# Patient Record
Sex: FEMALE | Race: BLACK OR AFRICAN AMERICAN | NOT HISPANIC OR LATINO | Employment: OTHER | URBAN - METROPOLITAN AREA
[De-identification: names, ages, dates, MRNs, and addresses within clinical notes are randomized per-mention and may not be internally consistent; named-entity substitution may affect disease eponyms.]

---

## 2017-01-03 ENCOUNTER — LAB VISIT (OUTPATIENT)
Dept: LAB | Facility: HOSPITAL | Age: 82
End: 2017-01-03
Attending: INTERNAL MEDICINE
Payer: MEDICARE

## 2017-01-03 ENCOUNTER — OFFICE VISIT (OUTPATIENT)
Dept: HEMATOLOGY/ONCOLOGY | Facility: CLINIC | Age: 82
End: 2017-01-03
Payer: MEDICARE

## 2017-01-03 VITALS
HEART RATE: 59 BPM | TEMPERATURE: 98 F | SYSTOLIC BLOOD PRESSURE: 198 MMHG | HEIGHT: 60 IN | OXYGEN SATURATION: 100 % | DIASTOLIC BLOOD PRESSURE: 60 MMHG | RESPIRATION RATE: 18 BRPM | WEIGHT: 104.06 LBS | BODY MASS INDEX: 20.43 KG/M2

## 2017-01-03 DIAGNOSIS — E80.6 HYPERBILIRUBINEMIA: ICD-10-CM

## 2017-01-03 DIAGNOSIS — R74.01 TRANSAMINITIS: ICD-10-CM

## 2017-01-03 DIAGNOSIS — E27.40 ADRENAL INSUFFICIENCY: Primary | ICD-10-CM

## 2017-01-03 DIAGNOSIS — D64.9 ANEMIA, UNSPECIFIED TYPE: ICD-10-CM

## 2017-01-03 LAB
ALBUMIN SERPL BCP-MCNC: 2.6 G/DL
ALP SERPL-CCNC: 93 U/L
ALT SERPL W/O P-5'-P-CCNC: 17 U/L
ANION GAP SERPL CALC-SCNC: 8 MMOL/L
AST SERPL-CCNC: 33 U/L
BILIRUB SERPL-MCNC: 0.6 MG/DL
BUN SERPL-MCNC: 24 MG/DL
CALCIUM SERPL-MCNC: 9 MG/DL
CHLORIDE SERPL-SCNC: 111 MMOL/L
CO2 SERPL-SCNC: 22 MMOL/L
CREAT SERPL-MCNC: 0.7 MG/DL
EST. GFR  (AFRICAN AMERICAN): >60 ML/MIN/1.73 M^2
EST. GFR  (NON AFRICAN AMERICAN): >60 ML/MIN/1.73 M^2
GLUCOSE SERPL-MCNC: 113 MG/DL
POTASSIUM SERPL-SCNC: 3.9 MMOL/L
PROT SERPL-MCNC: 6.8 G/DL
SODIUM SERPL-SCNC: 141 MMOL/L

## 2017-01-03 PROCEDURE — 1160F RVW MEDS BY RX/DR IN RCRD: CPT | Mod: S$GLB,,, | Performed by: INTERNAL MEDICINE

## 2017-01-03 PROCEDURE — 99214 OFFICE O/P EST MOD 30 MIN: CPT | Mod: S$GLB,,, | Performed by: INTERNAL MEDICINE

## 2017-01-03 PROCEDURE — 1157F ADVNC CARE PLAN IN RCRD: CPT | Mod: S$GLB,,, | Performed by: INTERNAL MEDICINE

## 2017-01-03 PROCEDURE — 99499 UNLISTED E&M SERVICE: CPT | Mod: S$GLB,,, | Performed by: INTERNAL MEDICINE

## 2017-01-03 PROCEDURE — 1126F AMNT PAIN NOTED NONE PRSNT: CPT | Mod: S$GLB,,, | Performed by: INTERNAL MEDICINE

## 2017-01-03 PROCEDURE — 1159F MED LIST DOCD IN RCRD: CPT | Mod: S$GLB,,, | Performed by: INTERNAL MEDICINE

## 2017-01-03 PROCEDURE — 80053 COMPREHEN METABOLIC PANEL: CPT

## 2017-01-03 PROCEDURE — 99999 PR PBB SHADOW E&M-EST. PATIENT-LVL III: CPT | Mod: PBBFAC,,, | Performed by: INTERNAL MEDICINE

## 2017-01-03 PROCEDURE — 36415 COLL VENOUS BLD VENIPUNCTURE: CPT

## 2017-01-03 RX ORDER — FLUDROCORTISONE ACETATE 0.1 MG/1
TABLET ORAL
Qty: 30 TABLET | Refills: 0 | Status: SHIPPED | OUTPATIENT
Start: 2017-01-03 | End: 2017-03-17

## 2017-01-03 RX ORDER — FLUDROCORTISONE ACETATE 0.1 MG/1
TABLET ORAL
Qty: 90 TABLET | Refills: 0 | Status: SHIPPED | OUTPATIENT
Start: 2017-01-03 | End: 2017-01-03

## 2017-01-03 NOTE — PROGRESS NOTES
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    She is smiling today.  Patient is accompanied here today by her daughter.  Her daughter reports the patient's appetite remains stable and the patient is continuing to remain active.  SHe does have a history of stroke and carotid artery stenosis.   She has been receiving intermittent procrit with IV iron when needed to help with anemia  She is tolerating meds lisinopril and Procardia for HTN    Diabetes appears well controlled with diet  She remains on Florinef for adrenal insufficiency   Blood pressure elevated again today.  Patient not compliant with her blood pressure medications  Patient reports that she is not having any problems with dizziness, headaches or visual changes.  She also refuses to give up salts in her diet      Current Outpatient Prescriptions:     aspirin (ECOTRIN) 81 MG EC tablet, Take 81 mg by mouth once daily., Disp: , Rfl:     blood sugar diagnostic Strp, True Result glucose strips check once daily, Disp: 100 each, Rfl: 3    blood-glucose meter Misc, True Results glucometer, Disp: 1 each, Rfl: 0    cloNIDine (CATAPRES) 0.1 MG tablet, Take 1 tablet (0.1 mg total) by mouth every morning., Disp: 30 tablet, Rfl: 2    clopidogrel (PLAVIX) 75 mg tablet, Take 1 tablet (75 mg total) by mouth once daily., Disp: 30 tablet, Rfl: 2    cyanocobalamin (VITAMIN B-12) 1000 MCG tablet, Take 100 mcg by mouth once daily., Disp: , Rfl:     ferrous sulfate 325 mg (65 mg iron) Tab tablet, Take 1 tablet (325 mg total) by mouth once daily., Disp: 90 tablet, Rfl: 0    fludrocortisone (FLORINEF) 0.1 mg Tab, TAKE ONE TABLET BY MOUTH ONCE DAILY, Disp: 90 tablet, Rfl: 0    furosemide (LASIX) 40 MG tablet, TAKE ONE TABLET BY MOUTH EVERY MORNING, Disp: 90 tablet, Rfl: 3    hydrOXYzine (ATARAX) 25 MG tablet, Take 25 mg by  mouth 2 (two) times daily as needed for Itching., Disp: , Rfl:     KLOR-CON M20 20 mEq tablet, TAKE ONE TABLET BY MOUTH TWICE DAILY, Disp: 180 tablet, Rfl: 0    lancets Misc, True Result lancets check once daily, Disp: 100 each, Rfl: 3    lisinopril (PRINIVIL,ZESTRIL) 40 MG tablet, Take 1 tablet (40 mg total) by mouth once daily., Disp: 30 tablet, Rfl: 2    nifedipine (PROCARDIA-XL) 60 MG (OSM) 24 hr tablet, Take 1 tablet (60 mg total) by mouth once daily., Disp: 90 tablet, Rfl: 3    pravastatin (PRAVACHOL) 40 MG tablet, Take 1 tablet (40 mg total) by mouth every evening., Disp: 90 tablet, Rfl: 3    vitamin D 1000 units Tab, Take 185 mg by mouth once daily., Disp: , Rfl:     vits A,C,E-lutein-zeax-zn-emanuel 9,650 unit-195 mg-95 unit Cap, Take 1 tablet by mouth 2 (two) times daily with meals., Disp: , Rfl:   No current facility-administered medications for this visit.     REVIEW OF SYSTEMS:  GENERAL:  No fever or chills.  Weight stable  HEENT:  No photophobia, rhinorrhea, sinus congestion, tinnitus, gingival   bleeding, oral ulcers, or sore throat.  RESPIRATORY:  No shortness of breath, cough, or wheezing.  GASTROINTESTINAL:  No abdominal pain, dysphagia, emesis, diarrhea, or   constipation.  No heartburn, abdominal distention, melena, or hematochezia.  GENITOURINARY:  No urinary frequency, hesitancy, dysuria, or hematuria.  MUSCULOSKELETAL:  No neck pain, back pain  NEUROLOGICAL:  No headaches,   Localized paresthesias  PSYCH:  No agitation, change in behavior, anxiety, or depression.      PHYSICAL EXAMINATION:  Visit Vitals    BP (!) 198/60 (BP Location: Right arm, Patient Position: Sitting, BP Method: Automatic)    Pulse (!) 59    Temp 97.9 °F (36.6 °C) (Oral)    Resp 18    Ht 5' (1.524 m)    Wt 47.2 kg (104 lb 0.9 oz)    LMP  (LMP Unknown)    SpO2 100%    BMI 20.32 kg/m2       GENERAL:  Thin body habitus cachectic.  HEENT:  Temporal wasting.  PSYCH:  Pleasant affect today.  LUNGS:  Clear, no use of  accessory muscles during respiration.  CARDIAC:  S1, S2.  ABDOMEN:  Nondistended.  EXTREMITIES:  Thin, no cyanosis,positive  edema.  SKIN:  Warm, dry.    LABS:      Lab Results   Component Value Date    WBC 5.70 12/16/2016    HGB 10.3 (L) 12/16/2016    HCT 32.2 (L) 12/16/2016     (H) 12/16/2016     (L) 12/16/2016       CMP  Sodium   Date Value Ref Range Status   01/03/2017 141 136 - 145 mmol/L Final     Potassium   Date Value Ref Range Status   01/03/2017 3.9 3.5 - 5.1 mmol/L Final     Chloride   Date Value Ref Range Status   01/03/2017 111 (H) 95 - 110 mmol/L Final     CO2   Date Value Ref Range Status   01/03/2017 22 (L) 23 - 29 mmol/L Final     Glucose   Date Value Ref Range Status   01/03/2017 113 (H) 70 - 110 mg/dL Final     BUN, Bld   Date Value Ref Range Status   01/03/2017 24 10 - 30 mg/dL Final     Creatinine   Date Value Ref Range Status   01/03/2017 0.7 0.5 - 1.4 mg/dL Final     Calcium   Date Value Ref Range Status   01/03/2017 9.0 8.7 - 10.5 mg/dL Final     Total Protein   Date Value Ref Range Status   01/03/2017 6.8 6.0 - 8.4 g/dL Final     Albumin   Date Value Ref Range Status   01/03/2017 2.6 (L) 3.5 - 5.2 g/dL Final     Total Bilirubin   Date Value Ref Range Status   01/03/2017 0.6 0.1 - 1.0 mg/dL Final     Comment:     For infants and newborns, interpretation of results should be based  on gestational age, weight and in agreement with clinical  observations.  Premature Infant recommended reference ranges:  Up to 24 hours.............<8.0 mg/dL  Up to 48 hours............<12.0 mg/dL  3-5 days..................<15.0 mg/dL  6-29 days.................<15.0 mg/dL       Alkaline Phosphatase   Date Value Ref Range Status   01/03/2017 93 55 - 135 U/L Final     AST   Date Value Ref Range Status   01/03/2017 33 10 - 40 U/L Final     ALT   Date Value Ref Range Status   01/03/2017 17 10 - 44 U/L Final     Anion Gap   Date Value Ref Range Status   01/03/2017 8 8 - 16 mmol/L Final     eGFR if     Date Value Ref Range Status   01/03/2017 >60 >60 mL/min/1.73 m^2 Final     eGFR if non    Date Value Ref Range Status   01/03/2017 >60 >60 mL/min/1.73 m^2 Final     Comment:     Calculation used to obtain the estimated glomerular filtration  rate (eGFR) is the CKD-EPI equation. Since race is unknown   in our information system, the eGFR values for   -American and Non--American patients are given   for each creatinine result.       Reviewed her labs and cortisol levels    IMPRESSION:  1.  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  Hypoalbuminemia with no improvement  4.  History of stroke equaling a hypercoagulable condition  5.  Elevated kappa light chain   6.  Hypertension not well controlled despite medication: Possibly related to the use of Florinef  7.  Diabetes with neuropathy  8.  Carotid artery stenosis bilaterally : sam plavix  9.  Edema Stable with  Lasix  10.  Adrenal insufficiency currently tolerating Florinef   No procrit today  Decreasing florinef to 1/2 tab every 3rd day  This will help with blood pressure   Explained her daughter had to watch the salt in her diet by using artificial salt products    The patient needs more protein and fluids with electrolytes other than salt  Patient is to continue her usual anticoagulation as above.    RTC 3 weeks for re evaluation  again Discussed diet and she is to ABSTAIN from salt

## 2017-01-03 NOTE — MR AVS SNAPSHOT
[facility]  [address] [place] [address] Drive [address]  [facility] [address]  Phone: [phone]                  Jaqui Odell   1/3/2017 2:20 PM   Office Visit    Description:  Female : 1924   Provider:  Brittnee Shay MD   Department:  [facility]           Reason for Visit     Results           Diagnoses this Visit        Comments    Adrenal insufficiency    -  Primary            To Do List           Future Appointments        Provider Department Dept Phone    2017 8:20 AM LAB[facility] [phone]    2017 1:00 PM MD [facility] - Hematology Oncology [phone]    2017 10:00 AM VASCULAR, CARDIOLOGY [facility]ip[facility] - Vascular Cardiology [phone]    2017 11:20 AM MD Nitesh Barrios [facility]-Inter[facility] [phone]    2017 1:20 PM Ruth Pink MD [facility] [phone]      Goals (5 Years of Data)     None       These Medications        Disp Refills Start End    fludrocortisone (FLORINEF) 0.1 mg Tab 30 tablet 0 1/3/2017     1/2 tab po every third day    Pharmacy: [facility] [address] [place] [address] Ph #: [phone]         [facility]     [facility] -  Assistance  Registered nurses in the [facility] provide clinical advisement, health education, appointment booking, and other advisory services.  Call for this free service at [phone].             Medications           Message regarding Medications     Verify the changes and/or additions to your medication regime listed below are the same as discussed with your clinician today.  If any of these changes or additions are incorrect, please notify your healthcare provider.        START taking these NEW medications        Refills    fludrocortisone (FLORINEF) 0.1 mg Tab 0    Si/2 tab po every third day    Class: Normal           Verify that the  below list of medications is an accurate representation of the medications you are currently taking.  If none reported, the list may be blank. If incorrect, please contact your healthcare provider. Carry this list with you in case of emergency.           Current Medications     aspirin (ECOTRIN) 81 MG EC tablet Take 81 mg by mouth once daily.    blood sugar diagnostic Strp True Result glucose strips check once daily    blood-glucose meter Misc True Results glucometer    cloNIDine (CATAPRES) 0.1 MG tablet Take 1 tablet (0.1 mg total) by mouth every morning.    clopidogrel (PLAVIX) 75 mg tablet Take 1 tablet (75 mg total) by mouth once daily.    cyanocobalamin (VITAMIN B-12) 1000 MCG tablet Take 100 mcg by mouth once daily.    ferrous sulfate 325 mg (65 mg iron) Tab tablet Take 1 tablet (325 mg total) by mouth once daily.    furosemide (LASIX) 40 MG tablet TAKE ONE TABLET BY MOUTH EVERY MORNING    hydrOXYzine (ATARAX) 25 MG tablet Take 25 mg by mouth 2 (two) times daily as needed for Itching.    KLOR-CON M20 20 mEq tablet TAKE ONE TABLET BY MOUTH TWICE DAILY    lancets Misc True Result lancets check once daily    lisinopril (PRINIVIL,ZESTRIL) 40 MG tablet Take 1 tablet (40 mg total) by mouth once daily.    nifedipine (PROCARDIA-XL) 60 MG (OSM) 24 hr tablet Take 1 tablet (60 mg total) by mouth once daily.    pravastatin (PRAVACHOL) 40 MG tablet Take 1 tablet (40 mg total) by mouth every evening.    vitamin D 1000 units Tab Take 185 mg by mouth once daily.    vits A,C,E-lutein-zeax-zn-emanuel 9,650 unit-195 mg-95 unit Cap Take 1 tablet by mouth 2 (two) times daily with meals.    fludrocortisone (FLORINEF) 0.1 mg Tab 1/2 tab po every third day           Clinical Reference Information           Vital Signs - Last Recorded  Most recent update: 1/3/2017  3:04 PM by Luiza Huang LPN    BP Pulse Temp Resp Ht Wt    (!) 198/60 (BP Location: Right arm, Patient Position: Sitting, BP Method: Automatic) (!) 59 97.9 °F (36.6 °C)  (Oral) 18 5' (1.524 m) 47.2 kg (104 lb 0.9 oz)    LMP SpO2 BMI          (LMP Unknown) 100% 20.32 kg/m2        Blood Pressure          Most Recent Value    BP  (!)  198/60      Allergies as of 1/3/2017     No Known Drug Allergies      Immunizations Administered on Date of Encounter - 1/3/2017     None

## 2017-01-16 RX ORDER — NIFEDIPINE 60 MG/1
TABLET, EXTENDED RELEASE ORAL
Qty: 90 TABLET | Refills: 3 | Status: SHIPPED | OUTPATIENT
Start: 2017-01-16 | End: 2018-01-29 | Stop reason: SDUPTHER

## 2017-01-23 ENCOUNTER — LAB VISIT (OUTPATIENT)
Dept: LAB | Facility: HOSPITAL | Age: 82
End: 2017-01-23
Attending: INTERNAL MEDICINE
Payer: MEDICARE

## 2017-01-23 DIAGNOSIS — D64.9 ANEMIA, UNSPECIFIED TYPE: ICD-10-CM

## 2017-01-23 DIAGNOSIS — E80.6 HYPERBILIRUBINEMIA: ICD-10-CM

## 2017-01-23 DIAGNOSIS — R74.01 TRANSAMINITIS: ICD-10-CM

## 2017-01-23 DIAGNOSIS — D50.8 OTHER IRON DEFICIENCY ANEMIA: ICD-10-CM

## 2017-01-23 LAB
ALBUMIN SERPL BCP-MCNC: 2.9 G/DL
ALP SERPL-CCNC: 90 U/L
ALT SERPL W/O P-5'-P-CCNC: 15 U/L
ANION GAP SERPL CALC-SCNC: 11 MMOL/L
AST SERPL-CCNC: 29 U/L
BASOPHILS # BLD AUTO: 0 K/UL
BASOPHILS NFR BLD: 1 %
BILIRUB SERPL-MCNC: 0.5 MG/DL
BUN SERPL-MCNC: 27 MG/DL
CALCIUM SERPL-MCNC: 9.5 MG/DL
CHLORIDE SERPL-SCNC: 112 MMOL/L
CO2 SERPL-SCNC: 21 MMOL/L
CREAT SERPL-MCNC: 0.8 MG/DL
DIFFERENTIAL METHOD: ABNORMAL
EOSINOPHIL # BLD AUTO: 0.2 K/UL
EOSINOPHIL NFR BLD: 3.2 %
ERYTHROCYTE [DISTWIDTH] IN BLOOD BY AUTOMATED COUNT: 13.4 %
EST. GFR  (AFRICAN AMERICAN): >60 ML/MIN/1.73 M^2
EST. GFR  (NON AFRICAN AMERICAN): >60 ML/MIN/1.73 M^2
GLUCOSE SERPL-MCNC: 112 MG/DL
HCT VFR BLD AUTO: 34.4 %
HGB BLD-MCNC: 11.3 G/DL
IRON SERPL-MCNC: 60 UG/DL
LYMPHOCYTES # BLD AUTO: 2 K/UL
LYMPHOCYTES NFR BLD: 41.1 %
MCH RBC QN AUTO: 31.7 PG
MCHC RBC AUTO-ENTMCNC: 32.9 %
MCV RBC AUTO: 97 FL
MONOCYTES # BLD AUTO: 0.3 K/UL
MONOCYTES NFR BLD: 5.8 %
NEUTROPHILS # BLD AUTO: 2.4 K/UL
NEUTROPHILS NFR BLD: 48.9 %
PLATELET # BLD AUTO: 137 K/UL
PMV BLD AUTO: 10.8 FL
POTASSIUM SERPL-SCNC: 3.8 MMOL/L
PROT SERPL-MCNC: 7 G/DL
RBC # BLD AUTO: 3.56 M/UL
SATURATED IRON: 24 %
SODIUM SERPL-SCNC: 144 MMOL/L
TOTAL IRON BINDING CAPACITY: 255 UG/DL
TRANSFERRIN SERPL-MCNC: 172 MG/DL
WBC # BLD AUTO: 4.8 K/UL

## 2017-01-23 PROCEDURE — 36415 COLL VENOUS BLD VENIPUNCTURE: CPT

## 2017-01-23 PROCEDURE — 83540 ASSAY OF IRON: CPT

## 2017-01-23 PROCEDURE — 85025 COMPLETE CBC W/AUTO DIFF WBC: CPT

## 2017-01-23 PROCEDURE — 80053 COMPREHEN METABOLIC PANEL: CPT

## 2017-01-25 ENCOUNTER — OFFICE VISIT (OUTPATIENT)
Dept: HEMATOLOGY/ONCOLOGY | Facility: CLINIC | Age: 82
End: 2017-01-25
Payer: MEDICARE

## 2017-01-25 VITALS
HEART RATE: 50 BPM | HEIGHT: 60 IN | TEMPERATURE: 98 F | DIASTOLIC BLOOD PRESSURE: 63 MMHG | RESPIRATION RATE: 16 BRPM | SYSTOLIC BLOOD PRESSURE: 134 MMHG

## 2017-01-25 DIAGNOSIS — D50.1 IRON DEFICIENCY ANEMIA DUE TO SIDEROPENIC DYSPHAGIA: Primary | ICD-10-CM

## 2017-01-25 DIAGNOSIS — D53.9 ANEMIA, MACROCYTIC: ICD-10-CM

## 2017-01-25 DIAGNOSIS — N18.4 CKD (CHRONIC KIDNEY DISEASE), STAGE 4 (SEVERE): ICD-10-CM

## 2017-01-25 DIAGNOSIS — M81.0 OSTEOPOROSIS: ICD-10-CM

## 2017-01-25 DIAGNOSIS — R29.6 FREQUENT FALLS: ICD-10-CM

## 2017-01-25 PROCEDURE — 1159F MED LIST DOCD IN RCRD: CPT | Mod: S$GLB,,, | Performed by: INTERNAL MEDICINE

## 2017-01-25 PROCEDURE — 1157F ADVNC CARE PLAN IN RCRD: CPT | Mod: S$GLB,,, | Performed by: INTERNAL MEDICINE

## 2017-01-25 PROCEDURE — 99999 PR PBB SHADOW E&M-EST. PATIENT-LVL III: CPT | Mod: PBBFAC,,, | Performed by: INTERNAL MEDICINE

## 2017-01-25 PROCEDURE — 1160F RVW MEDS BY RX/DR IN RCRD: CPT | Mod: S$GLB,,, | Performed by: INTERNAL MEDICINE

## 2017-01-25 PROCEDURE — 99213 OFFICE O/P EST LOW 20 MIN: CPT | Mod: S$GLB,,, | Performed by: INTERNAL MEDICINE

## 2017-01-25 PROCEDURE — 99499 UNLISTED E&M SERVICE: CPT | Mod: S$GLB,,, | Performed by: INTERNAL MEDICINE

## 2017-01-25 PROCEDURE — 1126F AMNT PAIN NOTED NONE PRSNT: CPT | Mod: S$GLB,,, | Performed by: INTERNAL MEDICINE

## 2017-01-25 NOTE — MR AVS SNAPSHOT
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    -  Primary     Anemia, macrocytic         Osteoporosis         CKD (chronic kidney disease), stage 4 (severe)         Frequent falls                To Do List           Future Appointments        Provider Department Dept Phone    2017 10:00 AM VASCULAR, CARDIOLOGY Nitesh Samano - Vascular Cardiology [phone]    2017 11:20 AM MD Nitesh Barrios [facility]-Interventional Card [phone]    2017 11:30 AM LAB[facility] [phone]    2/15/2017 1:00 PM Brittnee Shay MD [facility] [phone]    2017 1:20 PM Ruth Pink MD [facility] [phone]      Goals (5 Years of Data)     None      [facility]     [facility] -  Assistance  Registered nurses in the [facility] provide clinical advisement, health education, appointment booking, and other advisory services.  Call for this free service at [phone].             Medications           Message regarding Medications     Verify the changes and/or additions to your medication regime listed below are the same as discussed with your clinician today.  If any of these changes or additions are incorrect, please notify your healthcare provider.             Verify that the below list of medications is an accurate representation of the medications you are currently taking.  If none reported, the list may be blank. If incorrect, please contact your healthcare provider. Carry this list with you in case of  emergency.           Current Medications     aspirin (ECOTRIN) 81 MG EC tablet Take 81 mg by mouth once daily.    blood sugar diagnostic Strp True Result glucose strips check once daily    blood-glucose meter Misc True Results glucometer    cloNIDine (CATAPRES) 0.1 MG tablet Take 1 tablet (0.1 mg total) by mouth every morning.    clopidogrel (PLAVIX) 75 mg tablet Take 1 tablet (75 mg total) by mouth once daily.    cyanocobalamin (VITAMIN B-12) 1000 MCG tablet Take 100 mcg by mouth once daily.    ferrous sulfate 325 mg (65 mg iron) Tab tablet Take 1 tablet (325 mg total) by mouth once daily.    fludrocortisone (FLORINEF) 0.1 mg Tab 1/2 tab po every third day    furosemide (LASIX) 40 MG tablet TAKE ONE TABLET BY MOUTH EVERY MORNING    hydrOXYzine (ATARAX) 25 MG tablet Take 25 mg by mouth 2 (two) times daily as needed for Itching.    KLOR-CON M20 20 mEq tablet TAKE ONE TABLET BY MOUTH TWICE DAILY    lancets Misc True Result lancets check once daily    lisinopril (PRINIVIL,ZESTRIL) 40 MG tablet Take 1 tablet (40 mg total) by mouth once daily.    nifedipine (PROCARDIA-XL) 60 MG (OSM) 24 hr tablet TAKE ONE TABLET BY MOUTH ONCE DAILY    pravastatin (PRAVACHOL) 40 MG tablet Take 1 tablet (40 mg total) by mouth every evening.    vitamin D 1000 units Tab Take 185 mg by mouth once daily.    vits A,C,E-lutein-zeax-zn-emanuel 9,650 unit-195 mg-95 unit Cap Take 1 tablet by mouth 2 (two) times daily with meals.           Clinical Reference Information           Vital Signs - Last Recorded  Most recent update: 1/25/2017  1:18 PM by Rosa Marte LPN    BP Pulse Temp Resp Ht LMP    134/63 (!) 50 97.6 °F (36.4 °C) 16 5' (1.524 m) (LMP Unknown)      Blood Pressure          Most Recent Value    BP  134/63      Allergies as of 1/25/2017     No Known Drug Allergies      Immunizations Administered on Date of Encounter - 1/25/2017     None      Orders Placed During Today's Visit     Future Labs/Procedures Expected by Expires    CBC auto  differential  1/25/2017 3/26/2018    CMP  1/25/2017 3/26/2018

## 2017-01-25 NOTE — PROGRESS NOTES
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    She is weak today    Patient is accompanied here today by her daughter.    SHe does have a history of stroke and carotid artery stenosis.   Daughter states she is sleeping more than 50 % of the day, not eating  She is tolerating meds lisinopril and Procardia for HTN    Diabetes appears well controlled with diet  She remains on Florinef for adrenal insufficiency   Blood pressure good today.  Patient not compliant with her blood pressure medications  Patient reports that she is not having any problems with dizziness, headaches or visual changes.        Current Outpatient Prescriptions:     aspirin (ECOTRIN) 81 MG EC tablet, Take 81 mg by mouth once daily., Disp: , Rfl:     blood sugar diagnostic Strp, True Result glucose strips check once daily, Disp: 100 each, Rfl: 3    blood-glucose meter Misc, True Results glucometer, Disp: 1 each, Rfl: 0    cloNIDine (CATAPRES) 0.1 MG tablet, Take 1 tablet (0.1 mg total) by mouth every morning., Disp: 30 tablet, Rfl: 2    clopidogrel (PLAVIX) 75 mg tablet, Take 1 tablet (75 mg total) by mouth once daily., Disp: 30 tablet, Rfl: 2    cyanocobalamin (VITAMIN B-12) 1000 MCG tablet, Take 100 mcg by mouth once daily., Disp: , Rfl:     ferrous sulfate 325 mg (65 mg iron) Tab tablet, Take 1 tablet (325 mg total) by mouth once daily., Disp: 90 tablet, Rfl: 0    fludrocortisone (FLORINEF) 0.1 mg Tab, TAKE ONE TABLET BY MOUTH ONCE DAILY, Disp: 90 tablet, Rfl: 0    furosemide (LASIX) 40 MG tablet, TAKE ONE TABLET BY MOUTH EVERY MORNING, Disp: 90 tablet, Rfl: 3    hydrOXYzine (ATARAX) 25 MG tablet, Take 25 mg by mouth 2 (two) times daily as needed for Itching., Disp: , Rfl:     KLOR-CON M20 20 mEq tablet, TAKE ONE TABLET BY MOUTH TWICE DAILY, Disp: 180 tablet, Rfl: 0    lancets Misc, True  Result lancets check once daily, Disp: 100 each, Rfl: 3    lisinopril (PRINIVIL,ZESTRIL) 40 MG tablet, Take 1 tablet (40 mg total) by mouth once daily., Disp: 30 tablet, Rfl: 2    nifedipine (PROCARDIA-XL) 60 MG (OSM) 24 hr tablet, Take 1 tablet (60 mg total) by mouth once daily., Disp: 90 tablet, Rfl: 3    pravastatin (PRAVACHOL) 40 MG tablet, Take 1 tablet (40 mg total) by mouth every evening., Disp: 90 tablet, Rfl: 3    vitamin D 1000 units Tab, Take 185 mg by mouth once daily., Disp: , Rfl:     vits A,C,E-lutein-zeax-zn-emanuel 9,650 unit-195 mg-95 unit Cap, Take 1 tablet by mouth 2 (two) times daily with meals., Disp: , Rfl:   No current facility-administered medications for this visit.     REVIEW OF SYSTEMS:  GENERAL:  No fever or chills.  Weight stable increasing fatigue  HEENT:  No photophobia, rhinorrhea, sinus congestion, tinnitus, gingival   bleeding, oral ulcers, or sore throat.  RESPIRATORY:  No shortness of breath, cough, or wheezing.  GASTROINTESTINAL:  No abdominal pain, dysphagia, emesis, diarrhea, or   constipation.  No heartburn, abdominal distention, melena, or hematochezia.  GENITOURINARY:  No urinary frequency, hesitancy, dysuria, or hematuria.  MUSCULOSKELETAL:  No neck pain, back pain  NEUROLOGICAL:  No headaches,   Localized paresthesias  PSYCH:  No agitation, change in behavior, anxiety, or depression.      PHYSICAL EXAMINATION:  Visit Vitals    /63    Pulse (!) 50    Temp 97.6 °F (36.4 °C)    Resp 16    Ht 5' (1.524 m)    LMP  (LMP Unknown)       GENERAL:  Thin body habitus cachectic.  HEENT:  Temporal wasting.  PSYCH:  Pleasant affect today.  LUNGS:  Clear, no use of accessory muscles during respiration.  CARDIAC:  S1, S2.  ABDOMEN:  Nondistended.  EXTREMITIES:  Thin, no cyanosis,positive  edema.  SKIN:  Warm, dry. POSITIVE tenting    LABS:      Lab Results   Component Value Date    WBC 4.80 01/23/2017    HGB 11.3 (L) 01/23/2017    HCT 34.4 (L) 01/23/2017    MCV 97  01/23/2017     (L) 01/23/2017       CMP  Sodium   Date Value Ref Range Status   01/23/2017 144 136 - 145 mmol/L Final     Potassium   Date Value Ref Range Status   01/23/2017 3.8 3.5 - 5.1 mmol/L Final     Chloride   Date Value Ref Range Status   01/23/2017 112 (H) 95 - 110 mmol/L Final     CO2   Date Value Ref Range Status   01/23/2017 21 (L) 23 - 29 mmol/L Final     Glucose   Date Value Ref Range Status   01/23/2017 112 (H) 70 - 110 mg/dL Final     BUN, Bld   Date Value Ref Range Status   01/23/2017 27 10 - 30 mg/dL Final     Creatinine   Date Value Ref Range Status   01/23/2017 0.8 0.5 - 1.4 mg/dL Final     Calcium   Date Value Ref Range Status   01/23/2017 9.5 8.7 - 10.5 mg/dL Final     Total Protein   Date Value Ref Range Status   01/23/2017 7.0 6.0 - 8.4 g/dL Final     Albumin   Date Value Ref Range Status   01/23/2017 2.9 (L) 3.5 - 5.2 g/dL Final     Total Bilirubin   Date Value Ref Range Status   01/23/2017 0.5 0.1 - 1.0 mg/dL Final     Comment:     For infants and newborns, interpretation of results should be based  on gestational age, weight and in agreement with clinical  observations.  Premature Infant recommended reference ranges:  Up to 24 hours.............<8.0 mg/dL  Up to 48 hours............<12.0 mg/dL  3-5 days..................<15.0 mg/dL  6-29 days.................<15.0 mg/dL       Alkaline Phosphatase   Date Value Ref Range Status   01/23/2017 90 55 - 135 U/L Final     AST   Date Value Ref Range Status   01/23/2017 29 10 - 40 U/L Final     ALT   Date Value Ref Range Status   01/23/2017 15 10 - 44 U/L Final     Anion Gap   Date Value Ref Range Status   01/23/2017 11 8 - 16 mmol/L Final     eGFR if    Date Value Ref Range Status   01/23/2017 >60 >60 mL/min/1.73 m^2 Final     eGFR if non    Date Value Ref Range Status   01/23/2017 >60 >60 mL/min/1.73 m^2 Final     Comment:     Calculation used to obtain the estimated glomerular filtration  rate (eGFR) is the  CKD-EPI equation. Since race is unknown   in our information system, the eGFR values for   -American and Non--American patients are given   for each creatinine result.         IMPRESSION:  1.  Anemia stable MDS with renal disease, thrombocytopenia with no evidence of bleeding  2.  Hypoalbuminemia with no improvement  4.  History of stroke equaling a hypercoagulable condition  5.  Elevated kappa light chain   6.  Hypertension not well controlled despite medication: Possibly related to the use of Florinef  7.  Diabetes with neuropathy  8.  Carotid artery stenosis bilaterally : sam plavix  9.  Edema Stable with  Lasix  10.  Adrenal insufficiency currently tolerating Florinef   No procrit today  Decreasing florinef to 1/2 tab every 3rd day       RTC 3 weeks for re evaluation  Increase hydration with propel etc or elec and fluids  Pt is elderly   Discussed prognosisi

## 2017-02-11 DIAGNOSIS — E87.6 HYPOPOTASSEMIA: ICD-10-CM

## 2017-02-13 ENCOUNTER — LAB VISIT (OUTPATIENT)
Dept: LAB | Facility: HOSPITAL | Age: 82
End: 2017-02-13
Attending: INTERNAL MEDICINE
Payer: MEDICARE

## 2017-02-13 DIAGNOSIS — D50.1 IRON DEFICIENCY ANEMIA DUE TO SIDEROPENIC DYSPHAGIA: ICD-10-CM

## 2017-02-13 LAB
ALBUMIN SERPL BCP-MCNC: 2.7 G/DL
ALP SERPL-CCNC: 76 U/L
ALT SERPL W/O P-5'-P-CCNC: 20 U/L
ANION GAP SERPL CALC-SCNC: 8 MMOL/L
AST SERPL-CCNC: 34 U/L
BASOPHILS # BLD AUTO: 0.1 K/UL
BASOPHILS NFR BLD: 1.3 %
BILIRUB SERPL-MCNC: 0.4 MG/DL
BUN SERPL-MCNC: 33 MG/DL
CALCIUM SERPL-MCNC: 9.1 MG/DL
CHLORIDE SERPL-SCNC: 111 MMOL/L
CO2 SERPL-SCNC: 21 MMOL/L
CREAT SERPL-MCNC: 1 MG/DL
DIFFERENTIAL METHOD: ABNORMAL
EOSINOPHIL # BLD AUTO: 0.2 K/UL
EOSINOPHIL NFR BLD: 4 %
ERYTHROCYTE [DISTWIDTH] IN BLOOD BY AUTOMATED COUNT: 14.2 %
EST. GFR  (AFRICAN AMERICAN): 57 ML/MIN/1.73 M^2
EST. GFR  (NON AFRICAN AMERICAN): 49 ML/MIN/1.73 M^2
GLUCOSE SERPL-MCNC: 185 MG/DL
HCT VFR BLD AUTO: 29.7 %
HGB BLD-MCNC: 9.8 G/DL
LYMPHOCYTES # BLD AUTO: 2.6 K/UL
LYMPHOCYTES NFR BLD: 48.8 %
MCH RBC QN AUTO: 31.9 PG
MCHC RBC AUTO-ENTMCNC: 32.8 %
MCV RBC AUTO: 97 FL
MONOCYTES # BLD AUTO: 0.4 K/UL
MONOCYTES NFR BLD: 8.1 %
NEUTROPHILS # BLD AUTO: 2 K/UL
NEUTROPHILS NFR BLD: 37.8 %
PLATELET # BLD AUTO: 119 K/UL
PMV BLD AUTO: 11.4 FL
POTASSIUM SERPL-SCNC: 4.3 MMOL/L
PROT SERPL-MCNC: 6.3 G/DL
RBC # BLD AUTO: 3.06 M/UL
SODIUM SERPL-SCNC: 140 MMOL/L
WBC # BLD AUTO: 5.3 K/UL

## 2017-02-13 PROCEDURE — 85025 COMPLETE CBC W/AUTO DIFF WBC: CPT

## 2017-02-13 PROCEDURE — 80053 COMPREHEN METABOLIC PANEL: CPT

## 2017-02-13 PROCEDURE — 36415 COLL VENOUS BLD VENIPUNCTURE: CPT

## 2017-02-13 RX ORDER — POTASSIUM CHLORIDE 1500 MG/1
TABLET, EXTENDED RELEASE ORAL
Qty: 180 TABLET | Refills: 0 | Status: SHIPPED | OUTPATIENT
Start: 2017-02-13 | End: 2017-05-20 | Stop reason: SDUPTHER

## 2017-02-15 ENCOUNTER — TELEPHONE (OUTPATIENT)
Dept: HEMATOLOGY/ONCOLOGY | Facility: CLINIC | Age: 82
End: 2017-02-15

## 2017-02-15 NOTE — TELEPHONE ENCOUNTER
Called both pts numbers with no answers and unable to leave a voicemail due to voicemail not being set up.  Called [facility] and spoke her asking her to please tell Zina to return my call at [phone].  Bubba verbalizes understanding and appreciation.

## 2017-02-16 DIAGNOSIS — D63.1 ANEMIA IN CHRONIC KIDNEY DISEASE(285.21): Primary | ICD-10-CM

## 2017-02-16 DIAGNOSIS — N18.9 ANEMIA IN CHRONIC KIDNEY DISEASE(285.21): Primary | ICD-10-CM

## 2017-02-17 DIAGNOSIS — D50.9 IRON DEFICIENCY ANEMIA: ICD-10-CM

## 2017-02-20 DIAGNOSIS — N18.9 ANEMIA OF RENAL DISEASE: Primary | ICD-10-CM

## 2017-02-20 DIAGNOSIS — D63.1 ANEMIA OF RENAL DISEASE: Primary | ICD-10-CM

## 2017-02-21 ENCOUNTER — LAB VISIT (OUTPATIENT)
Dept: LAB | Facility: HOSPITAL | Age: 82
End: 2017-02-21
Attending: INTERNAL MEDICINE
Payer: MEDICARE

## 2017-02-21 DIAGNOSIS — R74.01 TRANSAMINITIS: ICD-10-CM

## 2017-02-21 DIAGNOSIS — D64.9 ANEMIA, UNSPECIFIED TYPE: ICD-10-CM

## 2017-02-21 DIAGNOSIS — E80.6 HYPERBILIRUBINEMIA: ICD-10-CM

## 2017-02-21 LAB
ALBUMIN SERPL BCP-MCNC: 2.9 G/DL
ALP SERPL-CCNC: 80 U/L
ALT SERPL W/O P-5'-P-CCNC: 16 U/L
ANION GAP SERPL CALC-SCNC: 9 MMOL/L
AST SERPL-CCNC: 24 U/L
BASOPHILS # BLD AUTO: 0 K/UL
BASOPHILS NFR BLD: 0.6 %
BILIRUB SERPL-MCNC: 0.5 MG/DL
BUN SERPL-MCNC: 32 MG/DL
CALCIUM SERPL-MCNC: 9.4 MG/DL
CHLORIDE SERPL-SCNC: 111 MMOL/L
CO2 SERPL-SCNC: 22 MMOL/L
CREAT SERPL-MCNC: 0.9 MG/DL
DIFFERENTIAL METHOD: ABNORMAL
EOSINOPHIL # BLD AUTO: 0.2 K/UL
EOSINOPHIL NFR BLD: 4.4 %
ERYTHROCYTE [DISTWIDTH] IN BLOOD BY AUTOMATED COUNT: 14 %
EST. GFR  (AFRICAN AMERICAN): >60 ML/MIN/1.73 M^2
EST. GFR  (NON AFRICAN AMERICAN): 56 ML/MIN/1.73 M^2
GLUCOSE SERPL-MCNC: 118 MG/DL
HCT VFR BLD AUTO: 30.5 %
HGB BLD-MCNC: 10.2 G/DL
LYMPHOCYTES # BLD AUTO: 2.6 K/UL
LYMPHOCYTES NFR BLD: 50.8 %
MCH RBC QN AUTO: 32 PG
MCHC RBC AUTO-ENTMCNC: 33.3 %
MCV RBC AUTO: 96 FL
MONOCYTES # BLD AUTO: 0.3 K/UL
MONOCYTES NFR BLD: 6.7 %
NEUTROPHILS # BLD AUTO: 1.9 K/UL
NEUTROPHILS NFR BLD: 37.5 %
PLATELET # BLD AUTO: 124 K/UL
PMV BLD AUTO: 10.9 FL
POTASSIUM SERPL-SCNC: 3.9 MMOL/L
PROT SERPL-MCNC: 6.7 G/DL
RBC # BLD AUTO: 3.18 M/UL
SODIUM SERPL-SCNC: 142 MMOL/L
WBC # BLD AUTO: 5.2 K/UL

## 2017-02-21 PROCEDURE — 85025 COMPLETE CBC W/AUTO DIFF WBC: CPT

## 2017-02-21 PROCEDURE — 80053 COMPREHEN METABOLIC PANEL: CPT

## 2017-02-21 PROCEDURE — 36415 COLL VENOUS BLD VENIPUNCTURE: CPT

## 2017-02-21 RX ORDER — FERROUS SULFATE 325(65) MG
TABLET ORAL
Qty: 30 TABLET | Refills: 0 | OUTPATIENT
Start: 2017-02-21

## 2017-02-22 ENCOUNTER — OFFICE VISIT (OUTPATIENT)
Dept: HEMATOLOGY/ONCOLOGY | Facility: CLINIC | Age: 82
End: 2017-02-22
Payer: MEDICARE

## 2017-02-22 VITALS
TEMPERATURE: 98 F | HEART RATE: 51 BPM | SYSTOLIC BLOOD PRESSURE: 107 MMHG | RESPIRATION RATE: 14 BRPM | HEIGHT: 60 IN | DIASTOLIC BLOOD PRESSURE: 50 MMHG

## 2017-02-22 DIAGNOSIS — D50.8 IRON DEFICIENCY ANEMIA SECONDARY TO INADEQUATE DIETARY IRON INTAKE: ICD-10-CM

## 2017-02-22 PROCEDURE — 99213 OFFICE O/P EST LOW 20 MIN: CPT | Mod: S$GLB,,, | Performed by: INTERNAL MEDICINE

## 2017-02-22 PROCEDURE — 99999 PR PBB SHADOW E&M-EST. PATIENT-LVL III: CPT | Mod: PBBFAC,,, | Performed by: INTERNAL MEDICINE

## 2017-02-22 PROCEDURE — 1160F RVW MEDS BY RX/DR IN RCRD: CPT | Mod: S$GLB,,, | Performed by: INTERNAL MEDICINE

## 2017-02-22 PROCEDURE — 99499 UNLISTED E&M SERVICE: CPT | Mod: S$GLB,,, | Performed by: INTERNAL MEDICINE

## 2017-02-22 PROCEDURE — 1126F AMNT PAIN NOTED NONE PRSNT: CPT | Mod: S$GLB,,, | Performed by: INTERNAL MEDICINE

## 2017-02-22 PROCEDURE — 1159F MED LIST DOCD IN RCRD: CPT | Mod: S$GLB,,, | Performed by: INTERNAL MEDICINE

## 2017-02-22 PROCEDURE — 1157F ADVNC CARE PLAN IN RCRD: CPT | Mod: S$GLB,,, | Performed by: INTERNAL MEDICINE

## 2017-02-22 RX ORDER — FERROUS SULFATE 325(65) MG
1 TABLET ORAL DAILY
Qty: 90 TABLET | Refills: 0 | Status: SHIPPED | OUTPATIENT
Start: 2017-02-22 | End: 2017-02-22 | Stop reason: SDUPTHER

## 2017-02-22 RX ORDER — FERROUS SULFATE 325(65) MG
1 TABLET ORAL DAILY
Qty: 90 TABLET | Refills: 0 | Status: SHIPPED | OUTPATIENT
Start: 2017-02-22 | End: 2017-03-17 | Stop reason: SDUPTHER

## 2017-02-22 NOTE — PROGRESS NOTES
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    She is weak today    Patient is accompanied here today by her daughter.    SHe does have a history of stroke and carotid artery stenosis.   Daughter states she is sleeping more than 50 % of the day, not eating  She is tolerating meds lisinopril and Procardia for HTN    Diabetes appears well controlled with diet  She remains on Florinef for adrenal insufficiency   Blood pressure good today.  Patient not compliant with her blood pressure medications  Patient reports that she is not having any problems with dizziness, headaches or visual changes.        Current Outpatient Prescriptions:     aspirin (ECOTRIN) 81 MG EC tablet, Take 81 mg by mouth once daily., Disp: , Rfl:     blood sugar diagnostic Strp, True Result glucose strips check once daily, Disp: 100 each, Rfl: 3    blood-glucose meter Misc, True Results glucometer, Disp: 1 each, Rfl: 0    cloNIDine (CATAPRES) 0.1 MG tablet, Take 1 tablet (0.1 mg total) by mouth every morning., Disp: 30 tablet, Rfl: 2    clopidogrel (PLAVIX) 75 mg tablet, Take 1 tablet (75 mg total) by mouth once daily., Disp: 30 tablet, Rfl: 2    cyanocobalamin (VITAMIN B-12) 1000 MCG tablet, Take 100 mcg by mouth once daily., Disp: , Rfl:     ferrous sulfate 325 mg (65 mg iron) Tab tablet, Take 1 tablet (325 mg total) by mouth once daily., Disp: 90 tablet, Rfl: 0    fludrocortisone (FLORINEF) 0.1 mg Tab, TAKE ONE TABLET BY MOUTH ONCE DAILY, Disp: 90 tablet, Rfl: 0    furosemide (LASIX) 40 MG tablet, TAKE ONE TABLET BY MOUTH EVERY MORNING, Disp: 90 tablet, Rfl: 3    hydrOXYzine (ATARAX) 25 MG tablet, Take 25 mg by mouth 2 (two) times daily as needed for Itching., Disp: , Rfl:     KLOR-CON M20 20 mEq tablet, TAKE ONE TABLET BY MOUTH TWICE DAILY, Disp: 180 tablet, Rfl: 0    lancets Misc, True  Result lancets check once daily, Disp: 100 each, Rfl: 3    lisinopril (PRINIVIL,ZESTRIL) 40 MG tablet, Take 1 tablet (40 mg total) by mouth once daily., Disp: 30 tablet, Rfl: 2    nifedipine (PROCARDIA-XL) 60 MG (OSM) 24 hr tablet, Take 1 tablet (60 mg total) by mouth once daily., Disp: 90 tablet, Rfl: 3    pravastatin (PRAVACHOL) 40 MG tablet, Take 1 tablet (40 mg total) by mouth every evening., Disp: 90 tablet, Rfl: 3    vitamin D 1000 units Tab, Take 185 mg by mouth once daily., Disp: , Rfl:     vits A,C,E-lutein-zeax-zn-emanuel 9,650 unit-195 mg-95 unit Cap, Take 1 tablet by mouth 2 (two) times daily with meals., Disp: , Rfl:   No current facility-administered medications for this visit.     REVIEW OF SYSTEMS:  GENERAL:  No fever or chills.  Weight stable increasing fatigue  HEENT:  No photophobia, rhinorrhea, sinus congestion, tinnitus, gingival   bleeding, oral ulcers, or sore throat.  RESPIRATORY:  No shortness of breath, cough, or wheezing.  GASTROINTESTINAL:  No abdominal pain, dysphagia, emesis, diarrhea, or   constipation.  No heartburn, abdominal distention, melena, or hematochezia.  GENITOURINARY:  No urinary frequency, hesitancy, dysuria, or hematuria.  MUSCULOSKELETAL:  No neck pain, back pain  NEUROLOGICAL:  No headaches,   Localized paresthesias  PSYCH:  No agitation, change in behavior, anxiety, or depression.      PHYSICAL EXAMINATION:  Visit Vitals    BP (!) 107/50    Pulse (!) 51    Temp 97.7 °F (36.5 °C)    Resp 14    Ht 5' (1.524 m)    LMP  (LMP Unknown)       GENERAL:  Thin body habitus cachectic.  HEENT:  Temporal wasting.  PSYCH:  Pleasant affect today.  LUNGS:  Clear, no use of accessory muscles during respiration.  CARDIAC:  S1, S2.  ABDOMEN:  Nondistended.  EXTREMITIES:  Thin, no cyanosis,positive  edema.  SKIN:  Warm, dry. POSITIVE tenting    LABS:      Lab Results   Component Value Date    WBC 5.20 02/21/2017    HGB 10.2 (L) 02/21/2017    HCT 30.5 (L) 02/21/2017    MCV 96  02/21/2017     (L) 02/21/2017       CMP  Sodium   Date Value Ref Range Status   02/21/2017 142 136 - 145 mmol/L Final     Potassium   Date Value Ref Range Status   02/21/2017 3.9 3.5 - 5.1 mmol/L Final     Chloride   Date Value Ref Range Status   02/21/2017 111 (H) 95 - 110 mmol/L Final     CO2   Date Value Ref Range Status   02/21/2017 22 (L) 23 - 29 mmol/L Final     Glucose   Date Value Ref Range Status   02/21/2017 118 (H) 70 - 110 mg/dL Final     BUN, Bld   Date Value Ref Range Status   02/21/2017 32 (H) 10 - 30 mg/dL Final     Creatinine   Date Value Ref Range Status   02/21/2017 0.9 0.5 - 1.4 mg/dL Final     Calcium   Date Value Ref Range Status   02/21/2017 9.4 8.7 - 10.5 mg/dL Final     Total Protein   Date Value Ref Range Status   02/21/2017 6.7 6.0 - 8.4 g/dL Final     Albumin   Date Value Ref Range Status   02/21/2017 2.9 (L) 3.5 - 5.2 g/dL Final     Total Bilirubin   Date Value Ref Range Status   02/21/2017 0.5 0.1 - 1.0 mg/dL Final     Comment:     For infants and newborns, interpretation of results should be based  on gestational age, weight and in agreement with clinical  observations.  Premature Infant recommended reference ranges:  Up to 24 hours.............<8.0 mg/dL  Up to 48 hours............<12.0 mg/dL  3-5 days..................<15.0 mg/dL  6-29 days.................<15.0 mg/dL       Alkaline Phosphatase   Date Value Ref Range Status   02/21/2017 80 55 - 135 U/L Final     AST   Date Value Ref Range Status   02/21/2017 24 10 - 40 U/L Final     ALT   Date Value Ref Range Status   02/21/2017 16 10 - 44 U/L Final     Anion Gap   Date Value Ref Range Status   02/21/2017 9 8 - 16 mmol/L Final     eGFR if    Date Value Ref Range Status   02/21/2017 >60 >60 mL/min/1.73 m^2 Final     eGFR if non    Date Value Ref Range Status   02/21/2017 56 (A) >60 mL/min/1.73 m^2 Final     Comment:     Calculation used to obtain the estimated glomerular filtration  rate (eGFR)  is the CKD-EPI equation. Since race is unknown   in our information system, the eGFR values for   -American and Non--American patients are given   for each creatinine result.         IMPRESSION:  1.  Anemia stable MDS with renal disease, thrombocytopenia with no evidence of bleeding  2.  Hypoalbuminemia with no improvement  4.  History of stroke equaling a hypercoagulable condition  5.  Elevated kappa light chain   6.  Hypotension today asymptomatic  7.  Diabetes with neuropathy  8.  Carotid artery stenosis bilaterally : sam plavix  9.  Edema Stable with  Lasix  10.  Adrenal insufficiency currently tolerating Florinef   11. Thrombocytopenia with no evidence of bleeding: asked pt to incorporate folate in her diet and eat to help  No procrit today or Feb 27    Recheck cbc in one week next wed or thursday    RTC 1 week for re evaluation  Increase hydration with propel etc or elec and fluids  Pt is elderly and needs to increase her food intake for nutritional reasons  Discussed prognosis

## 2017-03-02 ENCOUNTER — LAB VISIT (OUTPATIENT)
Dept: LAB | Facility: HOSPITAL | Age: 82
End: 2017-03-02
Attending: INTERNAL MEDICINE
Payer: MEDICARE

## 2017-03-02 DIAGNOSIS — E80.6 HYPERBILIRUBINEMIA: ICD-10-CM

## 2017-03-02 DIAGNOSIS — D64.9 ANEMIA, UNSPECIFIED TYPE: ICD-10-CM

## 2017-03-02 DIAGNOSIS — R74.01 TRANSAMINITIS: ICD-10-CM

## 2017-03-02 LAB
ALBUMIN SERPL BCP-MCNC: 2.7 G/DL
ALP SERPL-CCNC: 85 U/L
ALT SERPL W/O P-5'-P-CCNC: 12 U/L
ANION GAP SERPL CALC-SCNC: 8 MMOL/L
AST SERPL-CCNC: 21 U/L
BASOPHILS # BLD AUTO: 0 K/UL
BASOPHILS NFR BLD: 0.7 %
BILIRUB SERPL-MCNC: 0.6 MG/DL
BUN SERPL-MCNC: 21 MG/DL
CALCIUM SERPL-MCNC: 9.3 MG/DL
CHLORIDE SERPL-SCNC: 109 MMOL/L
CO2 SERPL-SCNC: 23 MMOL/L
CREAT SERPL-MCNC: 0.8 MG/DL
DIFFERENTIAL METHOD: ABNORMAL
EOSINOPHIL # BLD AUTO: 0.2 K/UL
EOSINOPHIL NFR BLD: 3.2 %
ERYTHROCYTE [DISTWIDTH] IN BLOOD BY AUTOMATED COUNT: 15.3 %
EST. GFR  (AFRICAN AMERICAN): >60 ML/MIN/1.73 M^2
EST. GFR  (NON AFRICAN AMERICAN): >60 ML/MIN/1.73 M^2
GLUCOSE SERPL-MCNC: 105 MG/DL
HCT VFR BLD AUTO: 32.5 %
HGB BLD-MCNC: 10.5 G/DL
LYMPHOCYTES # BLD AUTO: 1.9 K/UL
LYMPHOCYTES NFR BLD: 40.9 %
MCH RBC QN AUTO: 31.6 PG
MCHC RBC AUTO-ENTMCNC: 32.2 %
MCV RBC AUTO: 98 FL
MONOCYTES # BLD AUTO: 0.4 K/UL
MONOCYTES NFR BLD: 7.9 %
NEUTROPHILS # BLD AUTO: 2.3 K/UL
NEUTROPHILS NFR BLD: 47.3 %
PLATELET # BLD AUTO: 186 K/UL
PMV BLD AUTO: 10 FL
POTASSIUM SERPL-SCNC: 3.8 MMOL/L
PROT SERPL-MCNC: 6.9 G/DL
RBC # BLD AUTO: 3.31 M/UL
SODIUM SERPL-SCNC: 140 MMOL/L
WBC # BLD AUTO: 4.8 K/UL

## 2017-03-02 PROCEDURE — 80053 COMPREHEN METABOLIC PANEL: CPT

## 2017-03-02 PROCEDURE — 36415 COLL VENOUS BLD VENIPUNCTURE: CPT

## 2017-03-02 PROCEDURE — 85025 COMPLETE CBC W/AUTO DIFF WBC: CPT

## 2017-03-03 ENCOUNTER — OFFICE VISIT (OUTPATIENT)
Dept: HEMATOLOGY/ONCOLOGY | Facility: CLINIC | Age: 82
End: 2017-03-03
Payer: MEDICARE

## 2017-03-03 VITALS
TEMPERATURE: 98 F | BODY MASS INDEX: 19.09 KG/M2 | RESPIRATION RATE: 18 BRPM | HEIGHT: 60 IN | HEART RATE: 59 BPM | SYSTOLIC BLOOD PRESSURE: 146 MMHG | DIASTOLIC BLOOD PRESSURE: 65 MMHG | WEIGHT: 97.25 LBS

## 2017-03-03 DIAGNOSIS — D50.8 IRON DEFICIENCY ANEMIA SECONDARY TO INADEQUATE DIETARY IRON INTAKE: ICD-10-CM

## 2017-03-03 DIAGNOSIS — D63.1 ANEMIA OF RENAL DISEASE: Primary | ICD-10-CM

## 2017-03-03 DIAGNOSIS — N18.9 ANEMIA OF RENAL DISEASE: Primary | ICD-10-CM

## 2017-03-03 PROCEDURE — 1126F AMNT PAIN NOTED NONE PRSNT: CPT | Mod: S$GLB,,, | Performed by: INTERNAL MEDICINE

## 2017-03-03 PROCEDURE — 1160F RVW MEDS BY RX/DR IN RCRD: CPT | Mod: S$GLB,,, | Performed by: INTERNAL MEDICINE

## 2017-03-03 PROCEDURE — 1157F ADVNC CARE PLAN IN RCRD: CPT | Mod: S$GLB,,, | Performed by: INTERNAL MEDICINE

## 2017-03-03 PROCEDURE — 99999 PR PBB SHADOW E&M-EST. PATIENT-LVL IV: CPT | Mod: PBBFAC,,, | Performed by: INTERNAL MEDICINE

## 2017-03-03 PROCEDURE — 99499 UNLISTED E&M SERVICE: CPT | Mod: S$GLB,,, | Performed by: INTERNAL MEDICINE

## 2017-03-03 PROCEDURE — 99213 OFFICE O/P EST LOW 20 MIN: CPT | Mod: S$GLB,,, | Performed by: INTERNAL MEDICINE

## 2017-03-03 PROCEDURE — 1159F MED LIST DOCD IN RCRD: CPT | Mod: S$GLB,,, | Performed by: INTERNAL MEDICINE

## 2017-03-03 RX ORDER — FERROUS SULFATE 325(65) MG
1 TABLET ORAL DAILY
Qty: 90 TABLET | Refills: 0 | OUTPATIENT
Start: 2017-03-03

## 2017-03-03 NOTE — PROGRESS NOTES
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    She is weak today    Patient is accompanied here today by her daughter.    SHe does have a history of stroke and carotid artery stenosis.   Daughter states she is sleeping more than 50 % of the day, not eating  She is tolerating meds lisinopril and Procardia for HTN    Diabetes appears well controlled with diet  She remains on Florinef for adrenal insufficiency   Blood pressure good today.  Patient not compliant with her blood pressure medications  Patient reports that she is not having any problems with dizziness, headaches or visual changes.        Current Outpatient Prescriptions:     aspirin (ECOTRIN) 81 MG EC tablet, Take 81 mg by mouth once daily., Disp: , Rfl:     blood sugar diagnostic Strp, True Result glucose strips check once daily, Disp: 100 each, Rfl: 3    blood-glucose meter Misc, True Results glucometer, Disp: 1 each, Rfl: 0    cloNIDine (CATAPRES) 0.1 MG tablet, Take 1 tablet (0.1 mg total) by mouth every morning., Disp: 30 tablet, Rfl: 2    clopidogrel (PLAVIX) 75 mg tablet, Take 1 tablet (75 mg total) by mouth once daily., Disp: 30 tablet, Rfl: 2    cyanocobalamin (VITAMIN B-12) 1000 MCG tablet, Take 100 mcg by mouth once daily., Disp: , Rfl:     ferrous sulfate 325 mg (65 mg iron) Tab tablet, Take 1 tablet (325 mg total) by mouth once daily., Disp: 90 tablet, Rfl: 0    fludrocortisone (FLORINEF) 0.1 mg Tab, TAKE ONE TABLET BY MOUTH ONCE DAILY, Disp: 90 tablet, Rfl: 0    furosemide (LASIX) 40 MG tablet, TAKE ONE TABLET BY MOUTH EVERY MORNING, Disp: 90 tablet, Rfl: 3    hydrOXYzine (ATARAX) 25 MG tablet, Take 25 mg by mouth 2 (two) times daily as needed for Itching., Disp: , Rfl:     KLOR-CON M20 20 mEq tablet, TAKE ONE TABLET BY MOUTH TWICE DAILY, Disp: 180 tablet, Rfl: 0    lancets Misc, True  Result lancets check once daily, Disp: 100 each, Rfl: 3    lisinopril (PRINIVIL,ZESTRIL) 40 MG tablet, Take 1 tablet (40 mg total) by mouth once daily., Disp: 30 tablet, Rfl: 2    nifedipine (PROCARDIA-XL) 60 MG (OSM) 24 hr tablet, Take 1 tablet (60 mg total) by mouth once daily., Disp: 90 tablet, Rfl: 3    pravastatin (PRAVACHOL) 40 MG tablet, Take 1 tablet (40 mg total) by mouth every evening., Disp: 90 tablet, Rfl: 3    vitamin D 1000 units Tab, Take 185 mg by mouth once daily., Disp: , Rfl:     vits A,C,E-lutein-zeax-zn-emanuel 9,650 unit-195 mg-95 unit Cap, Take 1 tablet by mouth 2 (two) times daily with meals., Disp: , Rfl:   No current facility-administered medications for this visit.     REVIEW OF SYSTEMS:  GENERAL:  No fever or chills.  Weight stable increasing fatigue  HEENT:  No photophobia, rhinorrhea, sinus congestion, tinnitus, gingival   bleeding, oral ulcers, or sore throat.  RESPIRATORY:  No shortness of breath, cough, or wheezing.  GASTROINTESTINAL:  No abdominal pain, dysphagia, emesis, diarrhea, or   constipation.  No heartburn, abdominal distention, melena, or hematochezia.  GENITOURINARY:  No urinary frequency, hesitancy, dysuria, or hematuria.  MUSCULOSKELETAL:  No neck pain, back pain  NEUROLOGICAL:  No headaches,   Localized paresthesias  PSYCH:  No agitation, change in behavior, anxiety, or depression.      PHYSICAL EXAMINATION:  BP (!) 146/65 (BP Location: Left arm, Patient Position: Sitting, BP Method: Automatic)  Pulse (!) 59  Temp 97.6 °F (36.4 °C) (Oral)   Resp 18  Ht 5' (1.524 m)  Wt 44.1 kg (97 lb 3.6 oz)  LMP  (LMP Unknown)  BMI 18.99 kg/m2    GENERAL:  Thin body habitus cachectic.  HEENT:  Temporal wasting.  PSYCH:  Pleasant affect today.  LUNGS:  Clear, no use of accessory muscles during respiration.  CARDIAC:  S1, S2.  ABDOMEN:  Nondistended.  EXTREMITIES:  Thin, no cyanosis,positive  edema.  SKIN:  Warm, dry. POSITIVE tenting    LABS:      Lab Results   Component Value  Date    WBC 4.80 03/02/2017    HGB 10.5 (L) 03/02/2017    HCT 32.5 (L) 03/02/2017    MCV 98 03/02/2017     03/02/2017       CMP  Sodium   Date Value Ref Range Status   03/02/2017 140 136 - 145 mmol/L Final     Potassium   Date Value Ref Range Status   03/02/2017 3.8 3.5 - 5.1 mmol/L Final     Chloride   Date Value Ref Range Status   03/02/2017 109 95 - 110 mmol/L Final     CO2   Date Value Ref Range Status   03/02/2017 23 23 - 29 mmol/L Final     Glucose   Date Value Ref Range Status   03/02/2017 105 70 - 110 mg/dL Final     BUN, Bld   Date Value Ref Range Status   03/02/2017 21 10 - 30 mg/dL Final     Creatinine   Date Value Ref Range Status   03/02/2017 0.8 0.5 - 1.4 mg/dL Final     Calcium   Date Value Ref Range Status   03/02/2017 9.3 8.7 - 10.5 mg/dL Final     Total Protein   Date Value Ref Range Status   03/02/2017 6.9 6.0 - 8.4 g/dL Final     Albumin   Date Value Ref Range Status   03/02/2017 2.7 (L) 3.5 - 5.2 g/dL Final     Total Bilirubin   Date Value Ref Range Status   03/02/2017 0.6 0.1 - 1.0 mg/dL Final     Comment:     For infants and newborns, interpretation of results should be based  on gestational age, weight and in agreement with clinical  observations.  Premature Infant recommended reference ranges:  Up to 24 hours.............<8.0 mg/dL  Up to 48 hours............<12.0 mg/dL  3-5 days..................<15.0 mg/dL  6-29 days.................<15.0 mg/dL       Alkaline Phosphatase   Date Value Ref Range Status   03/02/2017 85 55 - 135 U/L Final     AST   Date Value Ref Range Status   03/02/2017 21 10 - 40 U/L Final     ALT   Date Value Ref Range Status   03/02/2017 12 10 - 44 U/L Final     Anion Gap   Date Value Ref Range Status   03/02/2017 8 8 - 16 mmol/L Final     eGFR if    Date Value Ref Range Status   03/02/2017 >60 >60 mL/min/1.73 m^2 Final     eGFR if non    Date Value Ref Range Status   03/02/2017 >60 >60 mL/min/1.73 m^2 Final     Comment:      Calculation used to obtain the estimated glomerular filtration  rate (eGFR) is the CKD-EPI equation. Since race is unknown   in our information system, the eGFR values for   -American and Non--American patients are given   for each creatinine result.         IMPRESSION:  1.  Anemia stable MDS with renal disease, thrombocytopenia with no evidence of bleeding  2.  Hypoalbuminemia with no improvement  4.  History of stroke equaling a hypercoagulable condition  5.  Elevated kappa light chain   6.  Hypotension today asymptomatic  7.  Diabetes with neuropathy  8.  Carotid artery stenosis bilaterally : sam plavix  9.  Edema Stable with  Lasix  10.  Adrenal insufficiency currently tolerating Florinef   11. Thrombocytopenia with no evidence of bleeding: asked pt to incorporate folate in her diet and eat to help  No procrit today     Recheck cbc in one week next wed or thursday    RTC 1 week for re evaluation  Increase hydration with propel etc or elec and fluids  Pt is elderly and needs to increase her food intake for nutritional reasons  Discussed prognosis

## 2017-03-03 NOTE — MR AVS SNAPSHOT
[place] [facility]  [address] [place] [address] Drive [address]  [place] [address]  Phone: [phone]                  Jaqui Odell   3/3/2017 10:20 AM   Office Visit    Description:  Female : 1924   Provider:  Brittnee Shay MD   Department:  [place] [facility]                To Do List           Future Appointments        Provider Department Dept Phone    3/17/2017 10:40 AM MD [facility] [phone]    2017 10:00 AM VASCULAR, CARDIOLOGY [facility] - Vascular Cardiology [phone]    2017 11:40 AM Akbar Castillo MD [facility]-Interventional Card [phone]    2017 1:20 PM Ruth Pink MD [facility] [phone]      Goals (5 Years of Data)     None      [facility] On Call     [facility] On Call Nurse Care Line -  Assistance  Registered nurses in the [facility] On Call Center provide clinical advisement, health education, appointment booking, and other advisory services.  Call for this free service at [phone].             Medications           Message regarding Medications     Verify the changes and/or additions to your medication regime listed below are the same as discussed with your clinician today.  If any of these changes or additions are incorrect, please notify your healthcare provider.             Verify that the below list of medications is an accurate representation of the medications you are currently taking.  If none reported, the list may be blank. If incorrect, please contact your healthcare provider. Carry this list with you in case of emergency.           Current Medications     aspirin (ECOTRIN) 81 MG EC tablet Take 81 mg by mouth once daily.    blood sugar diagnostic Strp True Result glucose strips check once daily    blood-glucose meter Misc True Results glucometer    cloNIDine (CATAPRES) 0.1 MG tablet Take 1 tablet (0.1 mg total) by mouth every morning.    clopidogrel (PLAVIX) 75 mg tablet  Take 1 tablet (75 mg total) by mouth once daily.    cyanocobalamin (VITAMIN B-12) 1000 MCG tablet Take 100 mcg by mouth once daily.    ferrous sulfate 325 mg (65 mg iron) Tab tablet Take 1 tablet (325 mg total) by mouth once daily.    fludrocortisone (FLORINEF) 0.1 mg Tab 1/2 tab po every third day    furosemide (LASIX) 40 MG tablet TAKE ONE TABLET BY MOUTH EVERY MORNING    hydrOXYzine (ATARAX) 25 MG tablet Take 25 mg by mouth 2 (two) times daily as needed for Itching.    KLOR-CON M20 20 mEq tablet TAKE ONE TABLET BY MOUTH TWICE DAILY    lancets Misc True Result lancets check once daily    lisinopril (PRINIVIL,ZESTRIL) 40 MG tablet Take 1 tablet (40 mg total) by mouth once daily.    nifedipine (PROCARDIA-XL) 60 MG (OSM) 24 hr tablet TAKE ONE TABLET BY MOUTH ONCE DAILY    pravastatin (PRAVACHOL) 40 MG tablet Take 1 tablet (40 mg total) by mouth every evening.    vitamin D 1000 units Tab Take 185 mg by mouth once daily.    vits A,C,E-lutein-zeax-zn-emanuel 9,650 unit-195 mg-95 unit Cap Take 1 tablet by mouth 2 (two) times daily with meals.           Clinical Reference Information           Your Vitals Were     BP Pulse Temp Resp Height Weight    146/65 (BP Location: Left arm, Patient Position: Sitting, BP Method: Automatic) 59 97.6 °F (36.4 °C) (Oral) 18 5' (1.524 m) 44.1 kg (97 lb 3.6 oz)    Last Period BMI             (LMP Unknown) 18.99 kg/m2         Blood Pressure          Most Recent Value    BP  (!)  146/65      Allergies as of 3/3/2017     No Known Drug Allergies      Immunizations Administered on Date of Encounter - 3/3/2017     None      Language Assistance Services     ATTENTION: Language assistance services are available, free of charge. Please call [phone].      ATENCIÓN: Si alejandro nguyen, tiene a cates disposición servicios gratuitos de asistencia lingüística. Llame al [phone].     CHÚ Ý: N?u b?n nói Ti?ng Vi?t, có các d?ch v? h? tr? ngôn ng? mi?n phí dành cho b?n. G?i s? [phone].          [facility] complies with applicable Federal civil rights laws and does not discriminate on the basis of race, color, national origin, age, disability, or sex.

## 2017-03-16 ENCOUNTER — LAB VISIT (OUTPATIENT)
Dept: LAB | Facility: HOSPITAL | Age: 82
End: 2017-03-16
Attending: INTERNAL MEDICINE
Payer: MEDICARE

## 2017-03-16 DIAGNOSIS — D64.9 ANEMIA, UNSPECIFIED TYPE: ICD-10-CM

## 2017-03-16 DIAGNOSIS — R74.01 TRANSAMINITIS: ICD-10-CM

## 2017-03-16 DIAGNOSIS — E80.6 HYPERBILIRUBINEMIA: ICD-10-CM

## 2017-03-16 LAB
BASOPHILS # BLD AUTO: 0 K/UL
BASOPHILS NFR BLD: 0.8 %
DIFFERENTIAL METHOD: ABNORMAL
EOSINOPHIL # BLD AUTO: 0.3 K/UL
EOSINOPHIL NFR BLD: 5.3 %
ERYTHROCYTE [DISTWIDTH] IN BLOOD BY AUTOMATED COUNT: 15.2 %
HCT VFR BLD AUTO: 30.3 %
HGB BLD-MCNC: 9.7 G/DL
LYMPHOCYTES # BLD AUTO: 1.9 K/UL
LYMPHOCYTES NFR BLD: 36.4 %
MCH RBC QN AUTO: 31.1 PG
MCHC RBC AUTO-ENTMCNC: 31.9 %
MCV RBC AUTO: 98 FL
MONOCYTES # BLD AUTO: 0.4 K/UL
MONOCYTES NFR BLD: 7.9 %
NEUTROPHILS # BLD AUTO: 2.6 K/UL
NEUTROPHILS NFR BLD: 49.6 %
PLATELET # BLD AUTO: 128 K/UL
PMV BLD AUTO: 11.2 FL
RBC # BLD AUTO: 3.11 M/UL
WBC # BLD AUTO: 5.3 K/UL

## 2017-03-16 PROCEDURE — 36415 COLL VENOUS BLD VENIPUNCTURE: CPT

## 2017-03-16 PROCEDURE — 85025 COMPLETE CBC W/AUTO DIFF WBC: CPT

## 2017-03-17 ENCOUNTER — OFFICE VISIT (OUTPATIENT)
Dept: HEMATOLOGY/ONCOLOGY | Facility: CLINIC | Age: 82
End: 2017-03-17
Payer: MEDICARE

## 2017-03-17 VITALS
TEMPERATURE: 99 F | HEIGHT: 60 IN | SYSTOLIC BLOOD PRESSURE: 182 MMHG | RESPIRATION RATE: 18 BRPM | WEIGHT: 95.88 LBS | DIASTOLIC BLOOD PRESSURE: 78 MMHG | HEART RATE: 64 BPM | BODY MASS INDEX: 18.82 KG/M2

## 2017-03-17 DIAGNOSIS — Z71.89 ENCOUNTER FOR MEDICATION REVIEW AND COUNSELING: ICD-10-CM

## 2017-03-17 DIAGNOSIS — M81.0 OSTEOPOROSIS: ICD-10-CM

## 2017-03-17 DIAGNOSIS — I10 ESSENTIAL HYPERTENSION: ICD-10-CM

## 2017-03-17 DIAGNOSIS — D50.8 IRON DEFICIENCY ANEMIA SECONDARY TO INADEQUATE DIETARY IRON INTAKE: ICD-10-CM

## 2017-03-17 DIAGNOSIS — D63.1 ANEMIA IN CHRONIC KIDNEY DISEASE(285.21): Primary | ICD-10-CM

## 2017-03-17 DIAGNOSIS — N18.9 ANEMIA IN CHRONIC KIDNEY DISEASE(285.21): Primary | ICD-10-CM

## 2017-03-17 PROCEDURE — 1159F MED LIST DOCD IN RCRD: CPT | Mod: S$GLB,,, | Performed by: INTERNAL MEDICINE

## 2017-03-17 PROCEDURE — 1160F RVW MEDS BY RX/DR IN RCRD: CPT | Mod: S$GLB,,, | Performed by: INTERNAL MEDICINE

## 2017-03-17 PROCEDURE — 99999 PR PBB SHADOW E&M-EST. PATIENT-LVL III: CPT | Mod: PBBFAC,,, | Performed by: INTERNAL MEDICINE

## 2017-03-17 PROCEDURE — 1157F ADVNC CARE PLAN IN RCRD: CPT | Mod: S$GLB,,, | Performed by: INTERNAL MEDICINE

## 2017-03-17 PROCEDURE — 1126F AMNT PAIN NOTED NONE PRSNT: CPT | Mod: S$GLB,,, | Performed by: INTERNAL MEDICINE

## 2017-03-17 PROCEDURE — 99213 OFFICE O/P EST LOW 20 MIN: CPT | Mod: S$GLB,,, | Performed by: INTERNAL MEDICINE

## 2017-03-17 PROCEDURE — 99499 UNLISTED E&M SERVICE: CPT | Mod: S$GLB,,, | Performed by: INTERNAL MEDICINE

## 2017-03-17 RX ORDER — CLONIDINE HYDROCHLORIDE 0.1 MG/1
0.1 TABLET ORAL EVERY MORNING
Qty: 30 TABLET | Refills: 2 | Status: SHIPPED | OUTPATIENT
Start: 2017-03-17 | End: 2017-06-23 | Stop reason: SDUPTHER

## 2017-03-17 RX ORDER — FERROUS SULFATE 325(65) MG
1 TABLET ORAL DAILY
Qty: 90 TABLET | Refills: 0 | Status: SHIPPED | OUTPATIENT
Start: 2017-03-17 | End: 2017-08-31 | Stop reason: SDUPTHER

## 2017-03-17 NOTE — PROGRESS NOTES
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    Last procrit over 3 weeks ago      Patient is accompanied here today by her daughter.    SHe does have a history of stroke and carotid artery stenosis.   Daughter states she is sleeping more than 50 % of the day, not eating  She is tolerating meds lisinopril and Procardia for HTN    Diabetes appears well controlled with diet  She remains on Florinef for adrenal insufficiency   Blood pressure good today.  Patient not compliant with her blood pressure medications  Patient reports that she is not having any problems with dizziness, headaches or visual changes.        Current Outpatient Prescriptions:     aspirin (ECOTRIN) 81 MG EC tablet, Take 81 mg by mouth once daily., Disp: , Rfl:     blood sugar diagnostic Strp, True Result glucose strips check once daily, Disp: 100 each, Rfl: 3    blood-glucose meter Misc, True Results glucometer, Disp: 1 each, Rfl: 0    cloNIDine (CATAPRES) 0.1 MG tablet, Take 1 tablet (0.1 mg total) by mouth every morning., Disp: 30 tablet, Rfl: 2    clopidogrel (PLAVIX) 75 mg tablet, Take 1 tablet (75 mg total) by mouth once daily., Disp: 30 tablet, Rfl: 2    cyanocobalamin (VITAMIN B-12) 1000 MCG tablet, Take 100 mcg by mouth once daily., Disp: , Rfl:     ferrous sulfate 325 mg (65 mg iron) Tab tablet, Take 1 tablet (325 mg total) by mouth once daily., Disp: 90 tablet, Rfl: 0    fludrocortisone (FLORINEF) 0.1 mg Tab, TAKE ONE TABLET BY MOUTH ONCE DAILY, Disp: 90 tablet, Rfl: 0    furosemide (LASIX) 40 MG tablet, TAKE ONE TABLET BY MOUTH EVERY MORNING, Disp: 90 tablet, Rfl: 3    hydrOXYzine (ATARAX) 25 MG tablet, Take 25 mg by mouth 2 (two) times daily as needed for Itching., Disp: , Rfl:     KLOR-CON M20 20 mEq tablet, TAKE ONE TABLET BY MOUTH TWICE DAILY, Disp: 180 tablet, Rfl: 0    lancets  Misc, True Result lancets check once daily, Disp: 100 each, Rfl: 3    lisinopril (PRINIVIL,ZESTRIL) 40 MG tablet, Take 1 tablet (40 mg total) by mouth once daily., Disp: 30 tablet, Rfl: 2    nifedipine (PROCARDIA-XL) 60 MG (OSM) 24 hr tablet, Take 1 tablet (60 mg total) by mouth once daily., Disp: 90 tablet, Rfl: 3    pravastatin (PRAVACHOL) 40 MG tablet, Take 1 tablet (40 mg total) by mouth every evening., Disp: 90 tablet, Rfl: 3    vitamin D 1000 units Tab, Take 185 mg by mouth once daily., Disp: , Rfl:     vits A,C,E-lutein-zeax-zn-emanuel 9,650 unit-195 mg-95 unit Cap, Take 1 tablet by mouth 2 (two) times daily with meals., Disp: , Rfl:   No current facility-administered medications for this visit.     REVIEW OF SYSTEMS:  GENERAL:  No fever or chills.  Weight stable increasing fatigue  HEENT:  No photophobia, rhinorrhea, sinus congestion, tinnitus, gingival   bleeding, oral ulcers, or sore throat.  RESPIRATORY:  No shortness of breath, cough, or wheezing.  GASTROINTESTINAL:  No abdominal pain, dysphagia, emesis, diarrhea, or   constipation.  No heartburn, abdominal distention, melena, or hematochezia.  GENITOURINARY:  No urinary frequency, hesitancy, dysuria, or hematuria.  MUSCULOSKELETAL:  No neck pain, back pain  NEUROLOGICAL:  No headaches,   Localized paresthesias  PSYCH:  No agitation, change in behavior, anxiety, or depression.      PHYSICAL EXAMINATION:  BP (!) 182/78  Pulse 64  Temp 99.2 °F (37.3 °C)  Resp 18  Ht 5' (1.524 m)  Wt 43.5 kg (95 lb 14.4 oz)  LMP  (LMP Unknown)  BMI 18.73 kg/m2    GENERAL:  Thin body habitus cachectic.  HEENT:  Temporal wasting.  PSYCH:  Pleasant affect today.  LUNGS:  Clear, no use of accessory muscles during respiration.  CARDIAC:  S1, S2.  ABDOMEN:  Nondistended.  EXTREMITIES:  Thin, no cyanosis,positive  edema.  SKIN:  Warm, dry. POSITIVE tenting    LABS:      Lab Results   Component Value Date    WBC 5.30 03/16/2017    HGB 9.7 (L) 03/16/2017    HCT 30.3 (L)  03/16/2017    MCV 98 03/16/2017     (L) 03/16/2017       CMP  Sodium   Date Value Ref Range Status   03/02/2017 140 136 - 145 mmol/L Final     Potassium   Date Value Ref Range Status   03/02/2017 3.8 3.5 - 5.1 mmol/L Final     Chloride   Date Value Ref Range Status   03/02/2017 109 95 - 110 mmol/L Final     CO2   Date Value Ref Range Status   03/02/2017 23 23 - 29 mmol/L Final     Glucose   Date Value Ref Range Status   03/02/2017 105 70 - 110 mg/dL Final     BUN, Bld   Date Value Ref Range Status   03/02/2017 21 10 - 30 mg/dL Final     Creatinine   Date Value Ref Range Status   03/02/2017 0.8 0.5 - 1.4 mg/dL Final     Calcium   Date Value Ref Range Status   03/02/2017 9.3 8.7 - 10.5 mg/dL Final     Total Protein   Date Value Ref Range Status   03/02/2017 6.9 6.0 - 8.4 g/dL Final     Albumin   Date Value Ref Range Status   03/02/2017 2.7 (L) 3.5 - 5.2 g/dL Final     Total Bilirubin   Date Value Ref Range Status   03/02/2017 0.6 0.1 - 1.0 mg/dL Final     Comment:     For infants and newborns, interpretation of results should be based  on gestational age, weight and in agreement with clinical  observations.  Premature Infant recommended reference ranges:  Up to 24 hours.............<8.0 mg/dL  Up to 48 hours............<12.0 mg/dL  3-5 days..................<15.0 mg/dL  6-29 days.................<15.0 mg/dL       Alkaline Phosphatase   Date Value Ref Range Status   03/02/2017 85 55 - 135 U/L Final     AST   Date Value Ref Range Status   03/02/2017 21 10 - 40 U/L Final     ALT   Date Value Ref Range Status   03/02/2017 12 10 - 44 U/L Final     Anion Gap   Date Value Ref Range Status   03/02/2017 8 8 - 16 mmol/L Final     eGFR if    Date Value Ref Range Status   03/02/2017 >60 >60 mL/min/1.73 m^2 Final     eGFR if non    Date Value Ref Range Status   03/02/2017 >60 >60 mL/min/1.73 m^2 Final     Comment:     Calculation used to obtain the estimated glomerular filtration  rate (eGFR)  is the CKD-EPI equation. Since race is unknown   in our information system, the eGFR values for   -American and Non--American patients are given   for each creatinine result.         IMPRESSION:  1.  Anemia stable MDS with renal disease, thrombocytopenia with no evidence of bleeding  2.  Hypoalbuminemia with no improvement  4.  History of stroke equaling a hypercoagulable condition  5.  Elevated kappa light chain   6.  Hypotension today asymptomatic  7.  Diabetes with neuropathy  8.  Carotid artery stenosis bilaterally : sam plavix  9.  Edema Stable with  Lasix  11. Thrombocytopenia with no evidence of bleeding: asked pt to incorporate folate in her diet and eat to help          CLEARED for procrit two weekly doses then RTC  Increase hydration with propel etc or elec and fluids  Cont iron and clonidine  Pt is elderly and needs to increase her food intake for nutritional reasons  Discussed prognosis  Procrit due   D/C florinef

## 2017-03-17 NOTE — MR AVS SNAPSHOT
[facility]  [address] [place] [address] Drive [address]  [facility] [address]  Phone: [phone]                  Jaqui Odell   3/17/2017 10:40 AM   Office Visit    Description:  Female : 1924   Provider:  Brittnee Shay MD   Department:  [facility]           Reason for Visit     Follow-up     Results           Diagnoses this Visit        Comments    Essential hypertension         Iron deficiency anemia secondary to inadequate dietary iron intake                To Do List           Future Appointments        Provider Department Dept Phone    2017 10:00 AM VASCULAR, CARDIOLOGY [facility] - Vascular Cardiology [phone]    2017 11:40 AM Akbar Castillo MD [facility]-Interventional Card [phone]    2017 1:20 PM Ruth Pink MD [place] [facility] Family Medicine [phone]      Goals (5 Years of Data)     None       These Medications        Disp Refills Start End    cloNIDine (CATAPRES) 0.1 MG tablet 30 tablet 2 3/17/2017     Take 1 tablet (0.1 mg total) by mouth every morning. - Oral    Pharmacy: [facility] [address] [place], [place] [id number] [facility] Ph #: [phone]       ferrous sulfate 325 mg (65 mg iron) Tab tablet 90 tablet 0 3/17/2017     Take 1 tablet (325 mg total) by mouth once daily. - Oral    Pharmacy: [place] [id number] [facility] Ph #: [phone]         [facility]     [facility] On Call Nurse Care Line -  Assistance  Registered nurses in the [facility] provide clinical advisement, health education, appointment booking, and other advisory services.  Call for this free service at [phone].             Medications           Message regarding Medications     Verify the changes and/or additions to your medication regime listed below are the same as discussed with your clinician today.  If any of these changes or additions are incorrect, please notify your healthcare provider.         STOP taking these medications     fludrocortisone (FLORINEF) 0.1 mg Tab 1/2 tab po every third day           Verify that the below list of medications is an accurate representation of the medications you are currently taking.  If none reported, the list may be blank. If incorrect, please contact your healthcare provider. Carry this list with you in case of emergency.           Current Medications     aspirin (ECOTRIN) 81 MG EC tablet Take 81 mg by mouth once daily.    blood sugar diagnostic Strp True Result glucose strips check once daily    blood-glucose meter Misc True Results glucometer    cloNIDine (CATAPRES) 0.1 MG tablet Take 1 tablet (0.1 mg total) by mouth every morning.    clopidogrel (PLAVIX) 75 mg tablet Take 1 tablet (75 mg total) by mouth once daily.    cyanocobalamin (VITAMIN B-12) 1000 MCG tablet Take 100 mcg by mouth once daily.    ferrous sulfate 325 mg (65 mg iron) Tab tablet Take 1 tablet (325 mg total) by mouth once daily.    furosemide (LASIX) 40 MG tablet TAKE ONE TABLET BY MOUTH EVERY MORNING    hydrOXYzine (ATARAX) 25 MG tablet Take 25 mg by mouth 2 (two) times daily as needed for Itching.    KLOR-CON M20 20 mEq tablet TAKE ONE TABLET BY MOUTH TWICE DAILY    lancets Misc True Result lancets check once daily    lisinopril (PRINIVIL,ZESTRIL) 40 MG tablet Take 1 tablet (40 mg total) by mouth once daily.    nifedipine (PROCARDIA-XL) 60 MG (OSM) 24 hr tablet TAKE ONE TABLET BY MOUTH ONCE DAILY    pravastatin (PRAVACHOL) 40 MG tablet Take 1 tablet (40 mg total) by mouth every evening.    vitamin D 1000 units Tab Take 185 mg by mouth once daily.    vits A,C,E-lutein-zeax-zn-emanuel 9,650 unit-195 mg-95 unit Cap Take 1 tablet by mouth 2 (two) times daily with meals.           Clinical Reference Information           Your Vitals Were     BP Pulse Temp Resp Height Weight    182/78 64 99.2 °F (37.3 °C) 18 5' (1.524 m) 43.5 kg (95 lb 14.4 oz)    Last Period BMI             (LMP Unknown) 18.73 kg/m2          Blood Pressure          Most Recent Value    BP  (!)  182/78      Allergies as of 3/17/2017     No Known Drug Allergies      Immunizations Administered on Date of Encounter - 3/17/2017     None      Language Assistance Services     ATTENTION: Language assistance services are available, free of charge. Please call [phone].      ATENCIÓN: Si habla patrick, tiene a cates disposición servicios gratuitos de asistencia lingüística. Llame al [phone].     CHÚ Ý: N?u b?n nói Ti?ng Vi?t, có các d?ch v? h? tr? ngôn ng? mi?n phí dành cho b?n. G?i s? [phone].         [facility] complies with applicable Federal civil rights laws and does not discriminate on the basis of race, color, national origin, age, disability, or sex.

## 2017-03-20 RX ORDER — SODIUM CHLORIDE 9 MG/ML
INJECTION, SOLUTION INTRAVENOUS CONTINUOUS
Status: CANCELLED | OUTPATIENT
Start: 2017-03-20

## 2017-03-20 RX ORDER — SODIUM CHLORIDE 0.9 % (FLUSH) 0.9 %
10 SYRINGE (ML) INJECTION
Status: CANCELLED | OUTPATIENT
Start: 2017-03-20

## 2017-03-20 RX ORDER — HEPARIN 100 UNIT/ML
5 SYRINGE INTRAVENOUS
Status: CANCELLED | OUTPATIENT
Start: 2017-03-20

## 2017-03-22 ENCOUNTER — DOCUMENTATION ONLY (OUTPATIENT)
Dept: HEMATOLOGY/ONCOLOGY | Facility: CLINIC | Age: 82
End: 2017-03-22

## 2017-04-03 ENCOUNTER — DOCUMENTATION ONLY (OUTPATIENT)
Dept: HEMATOLOGY/ONCOLOGY | Facility: CLINIC | Age: 82
End: 2017-04-03

## 2017-04-03 ENCOUNTER — TELEPHONE (OUTPATIENT)
Dept: CARDIOLOGY | Facility: CLINIC | Age: 82
End: 2017-04-03

## 2017-04-03 ENCOUNTER — LAB VISIT (OUTPATIENT)
Dept: LAB | Facility: HOSPITAL | Age: 82
End: 2017-04-03
Attending: INTERNAL MEDICINE
Payer: MEDICARE

## 2017-04-03 ENCOUNTER — OFFICE VISIT (OUTPATIENT)
Dept: HEMATOLOGY/ONCOLOGY | Facility: CLINIC | Age: 82
End: 2017-04-03
Payer: MEDICARE

## 2017-04-03 VITALS
RESPIRATION RATE: 17 BRPM | SYSTOLIC BLOOD PRESSURE: 193 MMHG | WEIGHT: 107.56 LBS | BODY MASS INDEX: 21.12 KG/M2 | DIASTOLIC BLOOD PRESSURE: 91 MMHG | HEART RATE: 56 BPM | TEMPERATURE: 99 F | HEIGHT: 60 IN

## 2017-04-03 DIAGNOSIS — E80.6 HYPERBILIRUBINEMIA: ICD-10-CM

## 2017-04-03 DIAGNOSIS — R74.01 TRANSAMINITIS: ICD-10-CM

## 2017-04-03 DIAGNOSIS — D64.9 ANEMIA, UNSPECIFIED TYPE: Primary | ICD-10-CM

## 2017-04-03 DIAGNOSIS — D64.9 ANEMIA, UNSPECIFIED TYPE: ICD-10-CM

## 2017-04-03 LAB
ANISOCYTOSIS BLD QL SMEAR: SLIGHT
BASOPHILS # BLD AUTO: ABNORMAL K/UL
BASOPHILS NFR BLD: 1 %
DIFFERENTIAL METHOD: ABNORMAL
EOSINOPHIL # BLD AUTO: ABNORMAL K/UL
EOSINOPHIL NFR BLD: 8 %
ERYTHROCYTE [DISTWIDTH] IN BLOOD BY AUTOMATED COUNT: 16.2 %
HCT VFR BLD AUTO: 35.5 %
HGB BLD-MCNC: 11.4 G/DL
LYMPHOCYTES # BLD AUTO: ABNORMAL K/UL
LYMPHOCYTES NFR BLD: 40 %
MCH RBC QN AUTO: 31.8 PG
MCHC RBC AUTO-ENTMCNC: 32 %
MCV RBC AUTO: 99 FL
MONOCYTES # BLD AUTO: ABNORMAL K/UL
MONOCYTES NFR BLD: 10 %
NEUTROPHILS NFR BLD: 41 %
PLATELET # BLD AUTO: 120 K/UL
PLATELET BLD QL SMEAR: ABNORMAL
PMV BLD AUTO: 10.6 FL
POLYCHROMASIA BLD QL SMEAR: ABNORMAL
RBC # BLD AUTO: 3.57 M/UL
WBC # BLD AUTO: 3.8 K/UL

## 2017-04-03 PROCEDURE — 1159F MED LIST DOCD IN RCRD: CPT | Mod: S$GLB,,, | Performed by: INTERNAL MEDICINE

## 2017-04-03 PROCEDURE — 99499 UNLISTED E&M SERVICE: CPT | Mod: S$GLB,,, | Performed by: INTERNAL MEDICINE

## 2017-04-03 PROCEDURE — 85027 COMPLETE CBC AUTOMATED: CPT

## 2017-04-03 PROCEDURE — 1160F RVW MEDS BY RX/DR IN RCRD: CPT | Mod: S$GLB,,, | Performed by: INTERNAL MEDICINE

## 2017-04-03 PROCEDURE — 99213 OFFICE O/P EST LOW 20 MIN: CPT | Mod: S$GLB,,, | Performed by: INTERNAL MEDICINE

## 2017-04-03 PROCEDURE — 85007 BL SMEAR W/DIFF WBC COUNT: CPT

## 2017-04-03 PROCEDURE — 36415 COLL VENOUS BLD VENIPUNCTURE: CPT

## 2017-04-03 PROCEDURE — 1157F ADVNC CARE PLAN IN RCRD: CPT | Mod: S$GLB,,, | Performed by: INTERNAL MEDICINE

## 2017-04-03 PROCEDURE — 99999 PR PBB SHADOW E&M-EST. PATIENT-LVL III: CPT | Mod: PBBFAC,,, | Performed by: INTERNAL MEDICINE

## 2017-04-03 PROCEDURE — 1126F AMNT PAIN NOTED NONE PRSNT: CPT | Mod: S$GLB,,, | Performed by: INTERNAL MEDICINE

## 2017-04-03 NOTE — PROGRESS NOTES
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          Patient is accompanied here today by her daughter.    SHe does have a history of stroke and carotid artery stenosis.   Daughter states she is sleeping more than 50 % of the day, not eating  She is tolerating meds lisinopril and Procardia for HTN    Diabetes appears well controlled with diet    Patient reports that she is not having any problems with dizziness, headaches or visual changes.      Current Outpatient Prescriptions:     aspirin (ECOTRIN) 81 MG EC tablet, Take 81 mg by mouth once daily., Disp: , Rfl:     blood sugar diagnostic Strp, True Result glucose strips check once daily, Disp: 100 each, Rfl: 3    blood-glucose meter Misc, True Results glucometer, Disp: 1 each, Rfl: 0    cloNIDine (CATAPRES) 0.1 MG tablet, Take 1 tablet (0.1 mg total) by mouth every morning., Disp: 30 tablet, Rfl: 2    clopidogrel (PLAVIX) 75 mg tablet, Take 1 tablet (75 mg total) by mouth once daily., Disp: 30 tablet, Rfl: 2    cyanocobalamin (VITAMIN B-12) 1000 MCG tablet, Take 100 mcg by mouth once daily., Disp: , Rfl:     ferrous sulfate 325 mg (65 mg iron) Tab tablet, Take 1 tablet (325 mg total) by mouth once daily., Disp: 90 tablet, Rfl: 0    fludrocortisone (FLORINEF) 0.1 mg Tab, TAKE ONE TABLET BY MOUTH ONCE DAILY, Disp: 90 tablet, Rfl: 0    furosemide (LASIX) 40 MG tablet, TAKE ONE TABLET BY MOUTH EVERY MORNING, Disp: 90 tablet, Rfl: 3    hydrOXYzine (ATARAX) 25 MG tablet, Take 25 mg by mouth 2 (two) times daily as needed for Itching., Disp: , Rfl:     KLOR-CON M20 20 mEq tablet, TAKE ONE TABLET BY MOUTH TWICE DAILY, Disp: 180 tablet, Rfl: 0    lancets Misc, True Result lancets check once daily, Disp: 100 each, Rfl: 3    lisinopril (PRINIVIL,ZESTRIL) 40 MG tablet, Take 1 tablet (40 mg total) by mouth once daily.,  Disp: 30 tablet, Rfl: 2    nifedipine (PROCARDIA-XL) 60 MG (OSM) 24 hr tablet, Take 1 tablet (60 mg total) by mouth once daily., Disp: 90 tablet, Rfl: 3    pravastatin (PRAVACHOL) 40 MG tablet, Take 1 tablet (40 mg total) by mouth every evening., Disp: 90 tablet, Rfl: 3    vitamin D 1000 units Tab, Take 185 mg by mouth once daily., Disp: , Rfl:     vits A,C,E-lutein-zeax-zn-emanuel 9,650 unit-195 mg-95 unit Cap, Take 1 tablet by mouth 2 (two) times daily with meals., Disp: , Rfl:   No current facility-administered medications for this visit.     REVIEW OF SYSTEMS:  GENERAL:  No fever or chills.  Weight stable increasing fatigue  HEENT:  No photophobia, rhinorrhea, sinus congestion, tinnitus, gingival   bleeding, oral ulcers, or sore throat.  RESPIRATORY:  No shortness of breath, cough, or wheezing.  GASTROINTESTINAL:  No abdominal pain, dysphagia, emesis, diarrhea, or   constipation.  No heartburn, abdominal distention, melena, or hematochezia.  GENITOURINARY:  No urinary frequency, hesitancy, dysuria, or hematuria.  MUSCULOSKELETAL:  No neck pain, back pain  NEUROLOGICAL:  No headaches,   Localized paresthesias  PSYCH:  No agitation, change in behavior, anxiety, or depression.      PHYSICAL EXAMINATION:  BP (!) 193/91 (BP Location: Right arm, Patient Position: Sitting, BP Method: Automatic)  Pulse (!) 56  Temp 98.5 °F (36.9 °C) (Oral)   Resp 17  Ht 5' (1.524 m)  Wt 48.8 kg (107 lb 9.4 oz)  LMP  (LMP Unknown)  BMI 21.01 kg/m2    GENERAL:  Thin body habitus cachectic.  HEENT:  Temporal wasting.  PSYCH:  Pleasant affect today.  LUNGS:  Clear, no use of accessory muscles during respiration.  CARDIAC:  S1, S2.  ABDOMEN:  Nondistended.  EXTREMITIES:  Thin, no cyanosis,positive  edema.  SKIN:  Warm, dry. POSITIVE tenting    LABS:      Lab Results   Component Value Date    WBC 3.80 (L) 04/03/2017    HGB 11.4 (L) 04/03/2017    HCT 35.5 (L) 04/03/2017    MCV 99 (H) 04/03/2017     (L) 04/03/2017        CMP  Sodium   Date Value Ref Range Status   03/02/2017 140 136 - 145 mmol/L Final     Potassium   Date Value Ref Range Status   03/02/2017 3.8 3.5 - 5.1 mmol/L Final     Chloride   Date Value Ref Range Status   03/02/2017 109 95 - 110 mmol/L Final     CO2   Date Value Ref Range Status   03/02/2017 23 23 - 29 mmol/L Final     Glucose   Date Value Ref Range Status   03/02/2017 105 70 - 110 mg/dL Final     BUN, Bld   Date Value Ref Range Status   03/02/2017 21 10 - 30 mg/dL Final     Creatinine   Date Value Ref Range Status   03/02/2017 0.8 0.5 - 1.4 mg/dL Final     Calcium   Date Value Ref Range Status   03/02/2017 9.3 8.7 - 10.5 mg/dL Final     Total Protein   Date Value Ref Range Status   03/02/2017 6.9 6.0 - 8.4 g/dL Final     Albumin   Date Value Ref Range Status   03/02/2017 2.7 (L) 3.5 - 5.2 g/dL Final     Total Bilirubin   Date Value Ref Range Status   03/02/2017 0.6 0.1 - 1.0 mg/dL Final     Comment:     For infants and newborns, interpretation of results should be based  on gestational age, weight and in agreement with clinical  observations.  Premature Infant recommended reference ranges:  Up to 24 hours.............<8.0 mg/dL  Up to 48 hours............<12.0 mg/dL  3-5 days..................<15.0 mg/dL  6-29 days.................<15.0 mg/dL       Alkaline Phosphatase   Date Value Ref Range Status   03/02/2017 85 55 - 135 U/L Final     AST   Date Value Ref Range Status   03/02/2017 21 10 - 40 U/L Final     ALT   Date Value Ref Range Status   03/02/2017 12 10 - 44 U/L Final     Anion Gap   Date Value Ref Range Status   03/02/2017 8 8 - 16 mmol/L Final     eGFR if    Date Value Ref Range Status   03/02/2017 >60 >60 mL/min/1.73 m^2 Final     eGFR if non    Date Value Ref Range Status   03/02/2017 >60 >60 mL/min/1.73 m^2 Final     Comment:     Calculation used to obtain the estimated glomerular filtration  rate (eGFR) is the CKD-EPI equation. Since race is unknown   in our  information system, the eGFR values for   -American and Non--American patients are given   for each creatinine result.         IMPRESSION:  1.  Anemia stable MDS with renal disease, thrombocytopenia with no evidence of bleeding  2.  Hypoalbuminemia with no improvement  4.  History of stroke equaling a hypercoagulable condition  5.  Elevated kappa light chain   6.  Hypotension today asymptomatic  7.  Diabetes with neuropathy  8.  Carotid artery stenosis bilaterally : sam plavix  9.  Edema Stable with  Lasix  11. Thrombocytopenia with no evidence of bleeding: asked pt to incorporate folate in her diet and eat to help      No procrit needed  RTC one month    HTN was addressed. Pt and her daughter state she did not eat this am so she did not take her medicine

## 2017-04-03 NOTE — TELEPHONE ENCOUNTER
----- Message from Sarahi Costello sent at 4/3/2017  3:22 PM CDT -----  Contact: Scooter (granddaughter)  Please call patient to reschedule appointment with Dr. Castillo. [phone].

## 2017-04-03 NOTE — MR AVS SNAPSHOT
[facility]  [address], [address]  [facility] [address]  Phone: [phone]                  Jaqui Odell   4/3/2017 1:00 PM   Office Visit    Description:  Female : 1924   Provider:  Brittnee Shay MD   Department:  [facility]           Diagnoses this Visit        Comments    Anemia, unspecified type    -  Primary            To Do List           Future Appointments        Provider Department Dept Phone    2017 10:00 AM VASCULAR, CARDIOLOGY [facility] - Vascular Cardiology [phone]    2017 11:40 AM Akbar Castillo MD [facility]-Interventional Card [phone]    2017 1:20 PM Brittnee Shay MD [facility] [phone]    2017 1:20 PM Ruth Pink MD [facility] Family Medicine [phone]      Goals (5 Years of Data)     None      Follow-Up and Disposition     Return in about 4 weeks (around 2017).    Follow-up and Disposition History      [facility]     [facility] On Call Nurse Care Line -  Assistance  Unless otherwise directed by your provider, please contact [facility], our nurse care line that is available for  assistance.     Registered nurses in the [facility] Center provide: appointment scheduling, clinical advisement, health education, and other advisory services.  Call: [phone] (toll free)               Medications           Message regarding Medications     Verify the changes and/or additions to your medication regime listed below are the same as discussed with your clinician today.  If any of these changes or additions are incorrect, please notify your healthcare provider.             Verify that the below list of medications is an accurate representation of the medications you are currently taking.  If none reported, the list may be blank. If incorrect, please contact your healthcare provider. Carry this list with you in case  of emergency.           Current Medications     aspirin (ECOTRIN) 81 MG EC tablet Take 81 mg by mouth once daily.    blood sugar diagnostic Strp True Result glucose strips check once daily    blood-glucose meter Misc True Results glucometer    cloNIDine (CATAPRES) 0.1 MG tablet Take 1 tablet (0.1 mg total) by mouth every morning.    clopidogrel (PLAVIX) 75 mg tablet Take 1 tablet (75 mg total) by mouth once daily.    cyanocobalamin (VITAMIN B-12) 1000 MCG tablet Take 100 mcg by mouth once daily.    ferrous sulfate 325 mg (65 mg iron) Tab tablet Take 1 tablet (325 mg total) by mouth once daily.    furosemide (LASIX) 40 MG tablet TAKE ONE TABLET BY MOUTH EVERY MORNING    hydrOXYzine (ATARAX) 25 MG tablet Take 25 mg by mouth 2 (two) times daily as needed for Itching.    KLOR-CON M20 20 mEq tablet TAKE ONE TABLET BY MOUTH TWICE DAILY    lancets Misc True Result lancets check once daily    lisinopril (PRINIVIL,ZESTRIL) 40 MG tablet Take 1 tablet (40 mg total) by mouth once daily.    nifedipine (PROCARDIA-XL) 60 MG (OSM) 24 hr tablet TAKE ONE TABLET BY MOUTH ONCE DAILY    pravastatin (PRAVACHOL) 40 MG tablet Take 1 tablet (40 mg total) by mouth every evening.    vitamin D 1000 units Tab Take 185 mg by mouth once daily.    vits A,C,E-lutein-zeax-zn-emanuel 9,650 unit-195 mg-95 unit Cap Take 1 tablet by mouth 2 (two) times daily with meals.           Clinical Reference Information           Your Vitals Were     BP Pulse Temp Resp Height Weight    193/91 (BP Location: Right arm, Patient Position: Sitting, BP Method: Automatic) 56 98.5 °F (36.9 °C) (Oral) 17 5' (1.524 m) 48.8 kg (107 lb 9.4 oz)    Last Period BMI             (LMP Unknown) 21.01 kg/m2         Blood Pressure          Most Recent Value    BP  (!)  193/91      Allergies as of 4/3/2017     No Known Drug Allergies      Immunizations Administered on Date of Encounter - 4/3/2017     None      Orders Placed During Today's Visit     Future Labs/Procedures Expected by  Expires    CBC auto differential  4/3/2017 6/2/2018      Language Assistance Services     ATTENTION: Language assistance services are available, free of charge. Please call [phone].      ATENCIÓN: Si habla patrick, tiene a cates disposición servicios gratuitos de asistencia lingüística. Llame al [phone].     CHÚ Ý: N?u b?n nói Ti?ng Vi?t, có các d?ch v? h? tr? ngôn ng? mi?n phí dành cho b?n. G?i s? [phone].         [facility] complies with applicable Federal civil rights laws and does not discriminate on the basis of race, color, national origin, age, disability, or sex.

## 2017-04-04 ENCOUNTER — CLINICAL SUPPORT (OUTPATIENT)
Dept: CARDIOLOGY | Facility: CLINIC | Age: 82
End: 2017-04-04
Payer: MEDICARE

## 2017-04-04 ENCOUNTER — OFFICE VISIT (OUTPATIENT)
Dept: CARDIOLOGY | Facility: CLINIC | Age: 82
End: 2017-04-04
Payer: MEDICARE

## 2017-04-04 VITALS
HEART RATE: 49 BPM | WEIGHT: 98.56 LBS | HEIGHT: 60 IN | SYSTOLIC BLOOD PRESSURE: 160 MMHG | DIASTOLIC BLOOD PRESSURE: 67 MMHG | OXYGEN SATURATION: 100 % | BODY MASS INDEX: 19.35 KG/M2

## 2017-04-04 DIAGNOSIS — E11.59 HYPERTENSION ASSOCIATED WITH DIABETES: ICD-10-CM

## 2017-04-04 DIAGNOSIS — I65.29 STENOSIS OF CAROTID ARTERY: ICD-10-CM

## 2017-04-04 DIAGNOSIS — Z86.73 HISTORY OF STROKE: Primary | ICD-10-CM

## 2017-04-04 DIAGNOSIS — I65.23 BILATERAL CAROTID ARTERY STENOSIS: ICD-10-CM

## 2017-04-04 DIAGNOSIS — I15.2 HYPERTENSION ASSOCIATED WITH DIABETES: ICD-10-CM

## 2017-04-04 LAB — INTERNAL CAROTID STENOSIS: NORMAL

## 2017-04-04 PROCEDURE — 1157F ADVNC CARE PLAN IN RCRD: CPT | Mod: S$GLB,,, | Performed by: INTERNAL MEDICINE

## 2017-04-04 PROCEDURE — 1159F MED LIST DOCD IN RCRD: CPT | Mod: S$GLB,,, | Performed by: INTERNAL MEDICINE

## 2017-04-04 PROCEDURE — 1126F AMNT PAIN NOTED NONE PRSNT: CPT | Mod: S$GLB,,, | Performed by: INTERNAL MEDICINE

## 2017-04-04 PROCEDURE — 93880 EXTRACRANIAL BILAT STUDY: CPT | Mod: S$GLB,,, | Performed by: INTERNAL MEDICINE

## 2017-04-04 PROCEDURE — 99999 PR PBB SHADOW E&M-EST. PATIENT-LVL IV: CPT | Mod: PBBFAC,,, | Performed by: INTERNAL MEDICINE

## 2017-04-04 PROCEDURE — 1160F RVW MEDS BY RX/DR IN RCRD: CPT | Mod: S$GLB,,, | Performed by: INTERNAL MEDICINE

## 2017-04-04 PROCEDURE — 99499 UNLISTED E&M SERVICE: CPT | Mod: S$GLB,,, | Performed by: INTERNAL MEDICINE

## 2017-04-04 PROCEDURE — 99215 OFFICE O/P EST HI 40 MIN: CPT | Mod: S$GLB,,, | Performed by: INTERNAL MEDICINE

## 2017-04-04 NOTE — PROGRESS NOTES
Subjective:       Patient ID: Jaqui Odell is a 89 y.o. female.    Chief Complaint: Carotid Stenosis    HPI     Ms. Odell is a 89 year old female with a past medical history of HTN, HLD, DM2, CVA, carotid artery stenosis s/p L ICA stent as a part of CREST 2 registry. She states that she has been doing well with no complaints. She has no SOB, CP, dizziness or lightheadedness.    Review of Systems   Constitutional: Negative for activity change, chills, fatigue and fever.   HENT: Negative.    Respiratory: Negative for cough and shortness of breath.    Cardiovascular: Negative for chest pain, palpitations and leg swelling.   Gastrointestinal: Negative for abdominal distention, diarrhea, nausea and vomiting.   Genitourinary: Negative for difficulty urinating, dysuria and urgency.   Musculoskeletal: Negative for arthralgias and myalgias.   Skin: Negative for color change and rash.   Neurological: Negative for dizziness, weakness, light-headedness and numbness.       Objective:     Vitals:    04/04/17 1148 04/04/17 1150   BP: (!) 149/68 (!) 160/67   BP Location: Right arm Left arm   Patient Position: Sitting Sitting   BP Method: Automatic Automatic   Pulse: (!) 49 (!) 49   SpO2: 100%    Weight: 44.7 kg (98 lb 8.7 oz)    Height: 5' (1.524 m)        Physical Exam   Constitutional: Vital signs are normal. She appears well-developed and well-nourished. She is active and cooperative.  Non-toxic appearance. No distress.   HENT:   Head: Normocephalic and atraumatic.   Mouth/Throat: Uvula is midline, oropharynx is clear and moist and mucous membranes are normal.   Eyes: Conjunctivae and lids are normal. Pupils are equal, round, and reactive to light.   Neck: Normal carotid pulses and no JVD present. Carotid bruit is not present.   Cardiovascular: Normal rate, regular rhythm, S1 normal, S2 normal and normal heart sounds.  Exam reveals no gallop.    No murmur heard.  Pulses:       Carotid pulses are 2+ on the right side, and 2+  on the left side.       Radial pulses are 2+ on the right side, and 2+ on the left side.        Dorsalis pedis pulses are 1+ on the right side, and 1+ on the left side.        Posterior tibial pulses are 1+ on the right side, and 1+ on the left side.   Pulmonary/Chest: Effort normal and breath sounds normal. No accessory muscle usage. No respiratory distress. She has no decreased breath sounds. She has no wheezes. She has no rhonchi. She has no rales.   Abdominal: Soft. Bowel sounds are normal. She exhibits no distension and no mass. There is no tenderness.   Neurological: She is alert.   Skin: Skin is warm, dry and intact.       Assessment:       1. History of stroke    2. Bilateral carotid artery stenosis    3. Hypertension associated with diabetes        Plan:     Ms. Odell is a 89 year old female with multiple comorbidities who presents for follow up for LICA stenting    1.) History of Stroke  --resulting in carotid artery stenting  --no new symptoms of stroke. Will monitor for now    2.) Bilateral carotid artery stenosis  --stenting 10/05/2015  --current carotid US with 20-39% stenosis evident bilaterally. No intervention required at this time.  --follow up in 1 year with repeat U/S    3.) HTN  --slightly elevated today, but is acceptable for age  --continue meds and monitor for now.    Discussed with Dr. Castillo. Staff addendum to follow    Ivone Odell MD  Cardiology PGY4    I have personally taken the history and examined this patient. I have discussed and agree with the resident's findings and plan as documented in the resident's note.  Akbar Castillo

## 2017-04-11 ENCOUNTER — DOCUMENTATION ONLY (OUTPATIENT)
Dept: HEMATOLOGY/ONCOLOGY | Facility: CLINIC | Age: 82
End: 2017-04-11

## 2017-04-26 DIAGNOSIS — I25.10 CORONARY ARTERY DISEASE, ANGINA PRESENCE UNSPECIFIED, UNSPECIFIED VESSEL OR LESION TYPE, UNSPECIFIED WHETHER NATIVE OR TRANSPLANTED HEART: ICD-10-CM

## 2017-04-27 RX ORDER — CLOPIDOGREL BISULFATE 75 MG/1
TABLET ORAL
Qty: 30 TABLET | Refills: 0 | Status: SHIPPED | OUTPATIENT
Start: 2017-04-27 | End: 2017-05-29 | Stop reason: SDUPTHER

## 2017-05-01 ENCOUNTER — OFFICE VISIT (OUTPATIENT)
Dept: HEMATOLOGY/ONCOLOGY | Facility: CLINIC | Age: 82
End: 2017-05-01
Payer: MEDICARE

## 2017-05-01 ENCOUNTER — LAB VISIT (OUTPATIENT)
Dept: LAB | Facility: HOSPITAL | Age: 82
End: 2017-05-01
Attending: INTERNAL MEDICINE
Payer: MEDICARE

## 2017-05-01 VITALS
DIASTOLIC BLOOD PRESSURE: 61 MMHG | SYSTOLIC BLOOD PRESSURE: 138 MMHG | TEMPERATURE: 98 F | RESPIRATION RATE: 12 BRPM | HEART RATE: 44 BPM | HEIGHT: 60 IN

## 2017-05-01 DIAGNOSIS — N18.9 ANEMIA IN CHRONIC KIDNEY DISEASE(285.21): Primary | ICD-10-CM

## 2017-05-01 DIAGNOSIS — D64.9 ANEMIA, UNSPECIFIED TYPE: ICD-10-CM

## 2017-05-01 DIAGNOSIS — D63.1 ANEMIA IN CHRONIC KIDNEY DISEASE(285.21): Primary | ICD-10-CM

## 2017-05-01 DIAGNOSIS — R74.01 TRANSAMINITIS: ICD-10-CM

## 2017-05-01 DIAGNOSIS — D69.6 THROMBOCYTOPENIA: ICD-10-CM

## 2017-05-01 DIAGNOSIS — E80.6 HYPERBILIRUBINEMIA: ICD-10-CM

## 2017-05-01 DIAGNOSIS — E11.69 HYPERLIPIDEMIA ASSOCIATED WITH TYPE 2 DIABETES MELLITUS: ICD-10-CM

## 2017-05-01 DIAGNOSIS — E78.5 HYPERLIPIDEMIA ASSOCIATED WITH TYPE 2 DIABETES MELLITUS: ICD-10-CM

## 2017-05-01 LAB
ALBUMIN SERPL BCP-MCNC: 3 G/DL
ALP SERPL-CCNC: 73 U/L
ALT SERPL W/O P-5'-P-CCNC: 14 U/L
ANION GAP SERPL CALC-SCNC: 9 MMOL/L
AST SERPL-CCNC: 24 U/L
BASOPHILS # BLD AUTO: 0 K/UL
BASOPHILS # BLD AUTO: 0 K/UL
BASOPHILS NFR BLD: 0.6 %
BASOPHILS NFR BLD: 0.6 %
BILIRUB SERPL-MCNC: 0.6 MG/DL
BUN SERPL-MCNC: 21 MG/DL
CALCIUM SERPL-MCNC: 9.3 MG/DL
CHLORIDE SERPL-SCNC: 114 MMOL/L
CO2 SERPL-SCNC: 19 MMOL/L
CREAT SERPL-MCNC: 0.9 MG/DL
DIFFERENTIAL METHOD: ABNORMAL
DIFFERENTIAL METHOD: ABNORMAL
EOSINOPHIL # BLD AUTO: 0.2 K/UL
EOSINOPHIL # BLD AUTO: 0.2 K/UL
EOSINOPHIL NFR BLD: 5.5 %
EOSINOPHIL NFR BLD: 5.5 %
ERYTHROCYTE [DISTWIDTH] IN BLOOD BY AUTOMATED COUNT: 14.6 %
ERYTHROCYTE [DISTWIDTH] IN BLOOD BY AUTOMATED COUNT: 14.6 %
EST. GFR  (AFRICAN AMERICAN): >60 ML/MIN/1.73 M^2
EST. GFR  (NON AFRICAN AMERICAN): 56 ML/MIN/1.73 M^2
GLUCOSE SERPL-MCNC: 152 MG/DL
HCT VFR BLD AUTO: 32.8 %
HCT VFR BLD AUTO: 32.8 %
HGB BLD-MCNC: 10.8 G/DL
HGB BLD-MCNC: 10.8 G/DL
LYMPHOCYTES # BLD AUTO: 1.7 K/UL
LYMPHOCYTES # BLD AUTO: 1.7 K/UL
LYMPHOCYTES NFR BLD: 43.8 %
LYMPHOCYTES NFR BLD: 43.8 %
MCH RBC QN AUTO: 32 PG
MCH RBC QN AUTO: 32 PG
MCHC RBC AUTO-ENTMCNC: 33 %
MCHC RBC AUTO-ENTMCNC: 33 %
MCV RBC AUTO: 97 FL
MCV RBC AUTO: 97 FL
MONOCYTES # BLD AUTO: 0.2 K/UL
MONOCYTES # BLD AUTO: 0.2 K/UL
MONOCYTES NFR BLD: 5.2 %
MONOCYTES NFR BLD: 5.2 %
NEUTROPHILS # BLD AUTO: 1.8 K/UL
NEUTROPHILS # BLD AUTO: 1.8 K/UL
NEUTROPHILS NFR BLD: 44.9 %
NEUTROPHILS NFR BLD: 44.9 %
PLATELET # BLD AUTO: 121 K/UL
PLATELET # BLD AUTO: 121 K/UL
PMV BLD AUTO: 10.4 FL
PMV BLD AUTO: 10.4 FL
POTASSIUM SERPL-SCNC: 4.3 MMOL/L
PROT SERPL-MCNC: 6.9 G/DL
RBC # BLD AUTO: 3.39 M/UL
RBC # BLD AUTO: 3.39 M/UL
SODIUM SERPL-SCNC: 142 MMOL/L
WBC # BLD AUTO: 4 K/UL
WBC # BLD AUTO: 4 K/UL

## 2017-05-01 PROCEDURE — 85025 COMPLETE CBC W/AUTO DIFF WBC: CPT

## 2017-05-01 PROCEDURE — 1126F AMNT PAIN NOTED NONE PRSNT: CPT | Mod: S$GLB,,, | Performed by: INTERNAL MEDICINE

## 2017-05-01 PROCEDURE — 1159F MED LIST DOCD IN RCRD: CPT | Mod: S$GLB,,, | Performed by: INTERNAL MEDICINE

## 2017-05-01 PROCEDURE — 99213 OFFICE O/P EST LOW 20 MIN: CPT | Mod: S$GLB,,, | Performed by: INTERNAL MEDICINE

## 2017-05-01 PROCEDURE — 1160F RVW MEDS BY RX/DR IN RCRD: CPT | Mod: S$GLB,,, | Performed by: INTERNAL MEDICINE

## 2017-05-01 PROCEDURE — 99999 PR PBB SHADOW E&M-EST. PATIENT-LVL III: CPT | Mod: PBBFAC,,, | Performed by: INTERNAL MEDICINE

## 2017-05-01 PROCEDURE — 99499 UNLISTED E&M SERVICE: CPT | Mod: S$GLB,,, | Performed by: INTERNAL MEDICINE

## 2017-05-01 PROCEDURE — 80053 COMPREHEN METABOLIC PANEL: CPT

## 2017-05-01 PROCEDURE — 36415 COLL VENOUS BLD VENIPUNCTURE: CPT

## 2017-05-01 NOTE — MR AVS SNAPSHOT
[facility]  [address], [address]  [place] [address]  Phone: [phone]                  Jaqui Odell   2017 1:20 PM   Office Visit    Description:  Female : 1924   Provider:  Brittnee Shay MD   Department:  [facility]           Reason for Visit     Follow-up                To Do List           Future Appointments        Provider Department Dept Phone    2017 3:00 PM [facility], [facility] [phone]    5/15/2017 10:10 AM [facility], [facility] [phone]    5/15/2017 11:40 AM Brittnee Shay MD [facility] [phone]    2017 1:20 PM Ruth Pink MD [facility] [phone]      Goals (5 Years of Data)     None      [facility]     [facility] Nurse Care Line -  Assistance  Unless otherwise directed by your provider, please contact [facility], our nurse care line that is available for  assistance.     Registered nurses in the [facility] provide: appointment scheduling, clinical advisement, health education, and other advisory services.  Call: [phone] (toll free)               Medications           Message regarding Medications     Verify the changes and/or additions to your medication regime listed below are the same as discussed with your clinician today.  If any of these changes or additions are incorrect, please notify your healthcare provider.             Verify that the below list of medications is an accurate representation of the medications you are currently taking.  If none reported, the list may be blank. If incorrect, please contact your healthcare provider. Carry this list with you in case of emergency.           Current Medications     aspirin (ECOTRIN) 81 MG EC tablet Take 81 mg by mouth once daily.    blood sugar diagnostic Strp True Result  glucose strips check once daily    blood-glucose meter Misc True Results glucometer    cloNIDine (CATAPRES) 0.1 MG tablet Take 1 tablet (0.1 mg total) by mouth every morning.    clopidogrel (PLAVIX) 75 mg tablet Take 1 tablet (75 mg total) by mouth once daily.    clopidogrel (PLAVIX) 75 mg tablet TAKE ONE TABLET BY MOUTH ONCE DAILY    cyanocobalamin (VITAMIN B-12) 1000 MCG tablet Take 100 mcg by mouth once daily.    ferrous sulfate 325 mg (65 mg iron) Tab tablet Take 1 tablet (325 mg total) by mouth once daily.    furosemide (LASIX) 40 MG tablet TAKE ONE TABLET BY MOUTH EVERY MORNING    hydrOXYzine (ATARAX) 25 MG tablet Take 25 mg by mouth 2 (two) times daily as needed for Itching.    KLOR-CON M20 20 mEq tablet TAKE ONE TABLET BY MOUTH TWICE DAILY    lancets Misc True Result lancets check once daily    lisinopril (PRINIVIL,ZESTRIL) 40 MG tablet Take 1 tablet (40 mg total) by mouth once daily.    nifedipine (PROCARDIA-XL) 60 MG (OSM) 24 hr tablet TAKE ONE TABLET BY MOUTH ONCE DAILY    pravastatin (PRAVACHOL) 40 MG tablet Take 1 tablet (40 mg total) by mouth every evening.    vitamin D 1000 units Tab Take 185 mg by mouth once daily.    vits A,C,E-lutein-zeax-zn-emanuel 9,650 unit-195 mg-95 unit Cap Take 1 tablet by mouth 2 (two) times daily with meals.           Clinical Reference Information           Your Vitals Were     BP Pulse Temp Resp Height Last Period    138/61 44 98.1 °F (36.7 °C) 12 5' (1.524 m) (LMP Unknown)      Blood Pressure          Most Recent Value    BP  138/61      Allergies as of 5/1/2017     No Known Drug Allergies      Immunizations Administered on Date of Encounter - 5/1/2017     None      Language Assistance Services     ATTENTION: Language assistance services are available, free of charge. Please call [phone].      ATENCIÓN: Si alejandro patrick, tiene a cates disposición servicios gratuitos de asistencia lingüística. Llame al [phone].     CHÚ Ý: N?u b?n nói Ti?ng Vi?t, có các d?ch v? h?  tr? abel meyer? mi?n phí dành cho b?n. G?i s? [phone].         [facility] complies with applicable Federal civil rights laws and does not discriminate on the basis of race, color, national origin, age, disability, or sex.

## 2017-05-01 NOTE — PROGRESS NOTES
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          Patient is accompanied here today by her daughter.    SHe does have a history of stroke and carotid artery stenosis.   Daughter states she is sleeping more than 50 % of the day, not eating  She is tolerating meds lisinopril and Procardia for HTN    Diabetes appears well controlled with diet    Patient reports that she is not having any problems with dizziness, headaches or visual changes.      Current Outpatient Prescriptions:     aspirin (ECOTRIN) 81 MG EC tablet, Take 81 mg by mouth once daily., Disp: , Rfl:     blood sugar diagnostic Strp, True Result glucose strips check once daily, Disp: 100 each, Rfl: 3    blood-glucose meter Misc, True Results glucometer, Disp: 1 each, Rfl: 0    cloNIDine (CATAPRES) 0.1 MG tablet, Take 1 tablet (0.1 mg total) by mouth every morning., Disp: 30 tablet, Rfl: 2    clopidogrel (PLAVIX) 75 mg tablet, Take 1 tablet (75 mg total) by mouth once daily., Disp: 30 tablet, Rfl: 2    cyanocobalamin (VITAMIN B-12) 1000 MCG tablet, Take 100 mcg by mouth once daily., Disp: , Rfl:     ferrous sulfate 325 mg (65 mg iron) Tab tablet, Take 1 tablet (325 mg total) by mouth once daily., Disp: 90 tablet, Rfl: 0    fludrocortisone (FLORINEF) 0.1 mg Tab, TAKE ONE TABLET BY MOUTH ONCE DAILY, Disp: 90 tablet, Rfl: 0    furosemide (LASIX) 40 MG tablet, TAKE ONE TABLET BY MOUTH EVERY MORNING, Disp: 90 tablet, Rfl: 3    hydrOXYzine (ATARAX) 25 MG tablet, Take 25 mg by mouth 2 (two) times daily as needed for Itching., Disp: , Rfl:     KLOR-CON M20 20 mEq tablet, TAKE ONE TABLET BY MOUTH TWICE DAILY, Disp: 180 tablet, Rfl: 0    lancets Misc, True Result lancets check once daily, Disp: 100 each, Rfl: 3    lisinopril (PRINIVIL,ZESTRIL) 40 MG tablet, Take 1 tablet (40 mg total) by mouth once daily.,  Disp: 30 tablet, Rfl: 2    nifedipine (PROCARDIA-XL) 60 MG (OSM) 24 hr tablet, Take 1 tablet (60 mg total) by mouth once daily., Disp: 90 tablet, Rfl: 3    pravastatin (PRAVACHOL) 40 MG tablet, Take 1 tablet (40 mg total) by mouth every evening., Disp: 90 tablet, Rfl: 3    vitamin D 1000 units Tab, Take 185 mg by mouth once daily., Disp: , Rfl:     vits A,C,E-lutein-zeax-zn-emanuel 9,650 unit-195 mg-95 unit Cap, Take 1 tablet by mouth 2 (two) times daily with meals., Disp: , Rfl:   No current facility-administered medications for this visit.     REVIEW OF SYSTEMS:  GENERAL:  No fever or chills.  Weight stable increasing fatigue  HEENT:  No photophobia, rhinorrhea, sinus congestion, tinnitus, gingival   bleeding, oral ulcers, or sore throat.  RESPIRATORY:  No shortness of breath, cough, or wheezing.  GASTROINTESTINAL:  No abdominal pain, dysphagia, emesis, diarrhea, or   constipation.  No heartburn, abdominal distention, melena, or hematochezia.  GENITOURINARY:  No urinary frequency, hesitancy, dysuria, or hematuria.  MUSCULOSKELETAL:  No neck pain, back pain  NEUROLOGICAL:  No headaches,   Localized paresthesias  PSYCH:  No agitation, change in behavior, anxiety, or depression.      PHYSICAL EXAMINATION:  /61  Pulse (!) 44  Temp 98.1 °F (36.7 °C)  Resp 12  Ht 5' (1.524 m)  LMP  (LMP Unknown)    GENERAL:  Thin body habitus cachectic.  HEENT:  Temporal wasting.  PSYCH:  Pleasant affect today.  LUNGS:  Clear, no use of accessory muscles during respiration.  CARDIAC:  S1, S2. bradycardia  ABDOMEN:  Nondistended.  EXTREMITIES:  Thin, no cyanosis,positive  edema.  SKIN:  Warm, dry. POSITIVE tenting    LABS:      Lab Results   Component Value Date    WBC 4.00 05/01/2017    WBC 4.00 05/01/2017    HGB 10.8 (L) 05/01/2017    HGB 10.8 (L) 05/01/2017    HCT 32.8 (L) 05/01/2017    HCT 32.8 (L) 05/01/2017    MCV 97 05/01/2017    MCV 97 05/01/2017     (L) 05/01/2017     (L) 05/01/2017       CMP  Sodium    Date Value Ref Range Status   03/02/2017 140 136 - 145 mmol/L Final     Potassium   Date Value Ref Range Status   03/02/2017 3.8 3.5 - 5.1 mmol/L Final     Chloride   Date Value Ref Range Status   03/02/2017 109 95 - 110 mmol/L Final     CO2   Date Value Ref Range Status   03/02/2017 23 23 - 29 mmol/L Final     Glucose   Date Value Ref Range Status   03/02/2017 105 70 - 110 mg/dL Final     BUN, Bld   Date Value Ref Range Status   03/02/2017 21 10 - 30 mg/dL Final     Creatinine   Date Value Ref Range Status   03/02/2017 0.8 0.5 - 1.4 mg/dL Final     Calcium   Date Value Ref Range Status   03/02/2017 9.3 8.7 - 10.5 mg/dL Final     Total Protein   Date Value Ref Range Status   03/02/2017 6.9 6.0 - 8.4 g/dL Final     Albumin   Date Value Ref Range Status   03/02/2017 2.7 (L) 3.5 - 5.2 g/dL Final     Total Bilirubin   Date Value Ref Range Status   03/02/2017 0.6 0.1 - 1.0 mg/dL Final     Comment:     For infants and newborns, interpretation of results should be based  on gestational age, weight and in agreement with clinical  observations.  Premature Infant recommended reference ranges:  Up to 24 hours.............<8.0 mg/dL  Up to 48 hours............<12.0 mg/dL  3-5 days..................<15.0 mg/dL  6-29 days.................<15.0 mg/dL       Alkaline Phosphatase   Date Value Ref Range Status   03/02/2017 85 55 - 135 U/L Final     AST   Date Value Ref Range Status   03/02/2017 21 10 - 40 U/L Final     ALT   Date Value Ref Range Status   03/02/2017 12 10 - 44 U/L Final     Anion Gap   Date Value Ref Range Status   03/02/2017 8 8 - 16 mmol/L Final     eGFR if    Date Value Ref Range Status   03/02/2017 >60 >60 mL/min/1.73 m^2 Final     eGFR if non    Date Value Ref Range Status   03/02/2017 >60 >60 mL/min/1.73 m^2 Final     Comment:     Calculation used to obtain the estimated glomerular filtration  rate (eGFR) is the CKD-EPI equation. Since race is unknown   in our information system,  the eGFR values for   -American and Non--American patients are given   for each creatinine result.         IMPRESSION:  1.  Anemia stable MDS with renal disease, thrombocytopenia with no evidence of bleeding  2.  Hypoalbuminemia with no improvement  4.  History of stroke equaling a hypercoagulable condition  5.  Elevated kappa light chain   6.  Bradycardia  today asymptomatic  7.  Diabetes with neuropathy  8.  Carotid artery stenosis bilaterally : sam plavix  9.  Edema Stable with  Lasix  11. Thrombocytopenia with no evidence of bleeding: asked pt to incorporate folate in her diet and eat to help      No procrit needed  RTC 2 weeks with cbc and diff and cmp     HTN was addressed. Pt and her daughter state she did not eat this am so she did not take her medicine

## 2017-05-15 ENCOUNTER — OFFICE VISIT (OUTPATIENT)
Dept: HEMATOLOGY/ONCOLOGY | Facility: CLINIC | Age: 82
End: 2017-05-15
Payer: MEDICARE

## 2017-05-15 ENCOUNTER — LAB VISIT (OUTPATIENT)
Dept: LAB | Facility: HOSPITAL | Age: 82
End: 2017-05-15
Attending: INTERNAL MEDICINE
Payer: MEDICARE

## 2017-05-15 VITALS
HEIGHT: 60 IN | HEART RATE: 57 BPM | RESPIRATION RATE: 16 BRPM | DIASTOLIC BLOOD PRESSURE: 81 MMHG | BODY MASS INDEX: 19.73 KG/M2 | SYSTOLIC BLOOD PRESSURE: 192 MMHG | TEMPERATURE: 100 F | WEIGHT: 100.5 LBS

## 2017-05-15 DIAGNOSIS — D64.9 ANEMIA, UNSPECIFIED TYPE: Primary | ICD-10-CM

## 2017-05-15 DIAGNOSIS — D64.9 ANEMIA, UNSPECIFIED TYPE: ICD-10-CM

## 2017-05-15 DIAGNOSIS — E80.6 HYPERBILIRUBINEMIA: ICD-10-CM

## 2017-05-15 DIAGNOSIS — R74.01 TRANSAMINITIS: ICD-10-CM

## 2017-05-15 LAB
ALBUMIN SERPL BCP-MCNC: 3 G/DL
ALP SERPL-CCNC: 74 U/L
ALT SERPL W/O P-5'-P-CCNC: 17 U/L
ANION GAP SERPL CALC-SCNC: 7 MMOL/L
AST SERPL-CCNC: 27 U/L
BASOPHILS # BLD AUTO: 0 K/UL
BASOPHILS NFR BLD: 0.9 %
BILIRUB SERPL-MCNC: 0.5 MG/DL
BUN SERPL-MCNC: 20 MG/DL
CALCIUM SERPL-MCNC: 9.2 MG/DL
CHLORIDE SERPL-SCNC: 117 MMOL/L
CO2 SERPL-SCNC: 19 MMOL/L
CREAT SERPL-MCNC: 0.8 MG/DL
DIFFERENTIAL METHOD: ABNORMAL
EOSINOPHIL # BLD AUTO: 0.3 K/UL
EOSINOPHIL NFR BLD: 6.8 %
ERYTHROCYTE [DISTWIDTH] IN BLOOD BY AUTOMATED COUNT: 14.5 %
EST. GFR  (AFRICAN AMERICAN): >60 ML/MIN/1.73 M^2
EST. GFR  (NON AFRICAN AMERICAN): >60 ML/MIN/1.73 M^2
GLUCOSE SERPL-MCNC: 90 MG/DL
HCT VFR BLD AUTO: 30.2 %
HGB BLD-MCNC: 10 G/DL
LYMPHOCYTES # BLD AUTO: 2.1 K/UL
LYMPHOCYTES NFR BLD: 40.7 %
MCH RBC QN AUTO: 32 PG
MCHC RBC AUTO-ENTMCNC: 33.1 %
MCV RBC AUTO: 97 FL
MONOCYTES # BLD AUTO: 0.4 K/UL
MONOCYTES NFR BLD: 8.1 %
NEUTROPHILS # BLD AUTO: 2.2 K/UL
NEUTROPHILS NFR BLD: 43.5 %
PLATELET # BLD AUTO: 121 K/UL
PMV BLD AUTO: 10.9 FL
POTASSIUM SERPL-SCNC: 4.3 MMOL/L
PROT SERPL-MCNC: 6.9 G/DL
RBC # BLD AUTO: 3.12 M/UL
SODIUM SERPL-SCNC: 143 MMOL/L
WBC # BLD AUTO: 5.1 K/UL

## 2017-05-15 PROCEDURE — 85025 COMPLETE CBC W/AUTO DIFF WBC: CPT

## 2017-05-15 PROCEDURE — 99213 OFFICE O/P EST LOW 20 MIN: CPT | Mod: S$GLB,,, | Performed by: INTERNAL MEDICINE

## 2017-05-15 PROCEDURE — 1160F RVW MEDS BY RX/DR IN RCRD: CPT | Mod: S$GLB,,, | Performed by: INTERNAL MEDICINE

## 2017-05-15 PROCEDURE — 99999 PR PBB SHADOW E&M-EST. PATIENT-LVL IV: CPT | Mod: PBBFAC,,, | Performed by: INTERNAL MEDICINE

## 2017-05-15 PROCEDURE — 1159F MED LIST DOCD IN RCRD: CPT | Mod: S$GLB,,, | Performed by: INTERNAL MEDICINE

## 2017-05-15 PROCEDURE — 99499 UNLISTED E&M SERVICE: CPT | Mod: S$GLB,,, | Performed by: INTERNAL MEDICINE

## 2017-05-15 PROCEDURE — 80053 COMPREHEN METABOLIC PANEL: CPT

## 2017-05-15 PROCEDURE — 1126F AMNT PAIN NOTED NONE PRSNT: CPT | Mod: S$GLB,,, | Performed by: INTERNAL MEDICINE

## 2017-05-15 PROCEDURE — 36415 COLL VENOUS BLD VENIPUNCTURE: CPT

## 2017-05-15 NOTE — PROGRESS NOTES
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    Had light egg and cheese biscuit this am   And blood pressure meds      Patient is accompanied here today by her daughter.    SHe does have a history of stroke and carotid artery stenosis.   Daughter states she is sleeping more than 50 % of the day, not eating  She is tolerating meds lisinopril and Procardia for HTN    Diabetes appears well controlled with diet    Patient reports that she is not having any problems with dizziness, headaches or visual changes.      Current Outpatient Prescriptions:     aspirin (ECOTRIN) 81 MG EC tablet, Take 81 mg by mouth once daily., Disp: , Rfl:     blood sugar diagnostic Strp, True Result glucose strips check once daily, Disp: 100 each, Rfl: 3    blood-glucose meter Misc, True Results glucometer, Disp: 1 each, Rfl: 0    cloNIDine (CATAPRES) 0.1 MG tablet, Take 1 tablet (0.1 mg total) by mouth every morning., Disp: 30 tablet, Rfl: 2    clopidogrel (PLAVIX) 75 mg tablet, Take 1 tablet (75 mg total) by mouth once daily., Disp: 30 tablet, Rfl: 2    cyanocobalamin (VITAMIN B-12) 1000 MCG tablet, Take 100 mcg by mouth once daily., Disp: , Rfl:     ferrous sulfate 325 mg (65 mg iron) Tab tablet, Take 1 tablet (325 mg total) by mouth once daily., Disp: 90 tablet, Rfl: 0    fludrocortisone (FLORINEF) 0.1 mg Tab, TAKE ONE TABLET BY MOUTH ONCE DAILY, Disp: 90 tablet, Rfl: 0    furosemide (LASIX) 40 MG tablet, TAKE ONE TABLET BY MOUTH EVERY MORNING, Disp: 90 tablet, Rfl: 3    hydrOXYzine (ATARAX) 25 MG tablet, Take 25 mg by mouth 2 (two) times daily as needed for Itching., Disp: , Rfl:     KLOR-CON M20 20 mEq tablet, TAKE ONE TABLET BY MOUTH TWICE DAILY, Disp: 180 tablet, Rfl: 0    lancets Misc, True Result lancets check once daily, Disp: 100 each, Rfl: 3    lisinopril (PRINIVIL,ZESTRIL)  40 MG tablet, Take 1 tablet (40 mg total) by mouth once daily., Disp: 30 tablet, Rfl: 2    nifedipine (PROCARDIA-XL) 60 MG (OSM) 24 hr tablet, Take 1 tablet (60 mg total) by mouth once daily., Disp: 90 tablet, Rfl: 3    pravastatin (PRAVACHOL) 40 MG tablet, Take 1 tablet (40 mg total) by mouth every evening., Disp: 90 tablet, Rfl: 3    vitamin D 1000 units Tab, Take 185 mg by mouth once daily., Disp: , Rfl:     vits A,C,E-lutein-zeax-zn-emanuel 9,650 unit-195 mg-95 unit Cap, Take 1 tablet by mouth 2 (two) times daily with meals., Disp: , Rfl:   No current facility-administered medications for this visit.     REVIEW OF SYSTEMS:  GENERAL:  No fever or chills.  Weight stable increasing fatigue  HEENT:  No photophobia, rhinorrhea, sinus congestion, tinnitus, gingival   bleeding, oral ulcers, or sore throat.  RESPIRATORY:  No shortness of breath, cough, or wheezing.  GASTROINTESTINAL:  No abdominal pain, dysphagia, emesis, diarrhea, or   constipation.  No heartburn, abdominal distention, melena, or hematochezia.  GENITOURINARY:  No urinary frequency, hesitancy, dysuria, or hematuria.  MUSCULOSKELETAL:  No neck pain, back pain  NEUROLOGICAL:  No headaches,   Localized paresthesias  PSYCH:  No agitation, change in behavior, anxiety, or depression.      PHYSICAL EXAMINATION:  BP (!) 192/81  Pulse (!) 57  Temp 99.9 °F (37.7 °C) (Oral)   Resp 16  Ht 5' (1.524 m)  Wt 45.6 kg (100 lb 8.5 oz)  LMP  (LMP Unknown)  BMI 19.63 kg/m2    GENERAL:  Thin body habitus cachectic.  HEENT:  Temporal wasting.  PSYCH:  Pleasant affect today.  LUNGS:  Clear, no use of accessory muscles during respiration.  CARDIAC:  S1, S2. bradycardia  ABDOMEN:  Nondistended.  EXTREMITIES:  Thin, no cyanosis,positive  edema.  SKIN:  Warm, dry. POSITIVE tenting    LABS:      Lab Results   Component Value Date    WBC 5.10 05/15/2017    HGB 10.0 (L) 05/15/2017    HCT 30.2 (L) 05/15/2017    MCV 97 05/15/2017     (L) 05/15/2017       CMP  Sodium    Date Value Ref Range Status   05/01/2017 142 136 - 145 mmol/L Final     Potassium   Date Value Ref Range Status   05/01/2017 4.3 3.5 - 5.1 mmol/L Final     Chloride   Date Value Ref Range Status   05/01/2017 114 (H) 95 - 110 mmol/L Final     CO2   Date Value Ref Range Status   05/01/2017 19 (L) 23 - 29 mmol/L Final     Glucose   Date Value Ref Range Status   05/01/2017 152 (H) 70 - 110 mg/dL Final     BUN, Bld   Date Value Ref Range Status   05/01/2017 21 10 - 30 mg/dL Final     Creatinine   Date Value Ref Range Status   05/01/2017 0.9 0.5 - 1.4 mg/dL Final     Calcium   Date Value Ref Range Status   05/01/2017 9.3 8.7 - 10.5 mg/dL Final     Total Protein   Date Value Ref Range Status   05/01/2017 6.9 6.0 - 8.4 g/dL Final     Albumin   Date Value Ref Range Status   05/01/2017 3.0 (L) 3.5 - 5.2 g/dL Final     Total Bilirubin   Date Value Ref Range Status   05/01/2017 0.6 0.1 - 1.0 mg/dL Final     Comment:     For infants and newborns, interpretation of results should be based  on gestational age, weight and in agreement with clinical  observations.  Premature Infant recommended reference ranges:  Up to 24 hours.............<8.0 mg/dL  Up to 48 hours............<12.0 mg/dL  3-5 days..................<15.0 mg/dL  6-29 days.................<15.0 mg/dL       Alkaline Phosphatase   Date Value Ref Range Status   05/01/2017 73 55 - 135 U/L Final     AST   Date Value Ref Range Status   05/01/2017 24 10 - 40 U/L Final     ALT   Date Value Ref Range Status   05/01/2017 14 10 - 44 U/L Final     Anion Gap   Date Value Ref Range Status   05/01/2017 9 8 - 16 mmol/L Final     eGFR if    Date Value Ref Range Status   05/01/2017 >60 >60 mL/min/1.73 m^2 Final     eGFR if non    Date Value Ref Range Status   05/01/2017 56 (A) >60 mL/min/1.73 m^2 Final     Comment:     Calculation used to obtain the estimated glomerular filtration  rate (eGFR) is the CKD-EPI equation. Since race is unknown   in our  information system, the eGFR values for   -American and Non--American patients are given   for each creatinine result.         IMPRESSION:  1.  Anemia stable MDS with renal disease, thrombocytopenia with no evidence of bleeding  2.  Hypoalbuminemia with no improvement  4.  History of stroke equaling a hypercoagulable condition  5.  Elevated kappa light chain   6.  Bradycardia  today asymptomatic  7.  Diabetes with neuropathy  8.  Carotid artery stenosis bilaterally : sam plavix  9.  Edema Stable with  Lasix  11. Thrombocytopenia with no evidence of bleeding: asked pt to incorporate folate in her diet and eat to help  12. Rhinorrhea    No procrit needed   Handicap parking needed  Add allegra daily   Made her an appt with pcp for BP check  D/C all salt from diet  RTC 2 weeks with cbc and diff and cmp     HTN was addressed. Pt and her daughter state she did not eat this am so she did not take her medicine

## 2017-05-15 NOTE — MR AVS SNAPSHOT
[facility]  [address], [address]  [facility] [address]  Phone: [phone]                  Jaqui Odell   5/15/2017 11:40 AM   Office Visit    Description:  Female : 1924   Provider:  Brittnee Shay MD   Department:  [facility]           Reason for Visit     Follow-up                To Do List           Future Appointments        Provider Department Dept Phone    2017 8:40 AM LAB, [facility] [phone]    2017 11:00 AM Brittnee Shay MD [facility] [phone]    2017 1:20 PM Ruth Pink MD [facility] [phone]    2017 11:00 AM Zeferino Montana DPM [facility] Podiatry [phone]      Goals (5 Years of Data)     None      [facility]     [facility] Nurse Care Line -  Assistance  Unless otherwise directed by your provider, please contact [facility], our nurse care line that is available for  assistance.     Registered nurses in the [facility] provide: appointment scheduling, clinical advisement, health education, and other advisory services.  Call: [phone] (toll free)               Medications           Message regarding Medications     Verify the changes and/or additions to your medication regime listed below are the same as discussed with your clinician today.  If any of these changes or additions are incorrect, please notify your healthcare provider.             Verify that the below list of medications is an accurate representation of the medications you are currently taking.  If none reported, the list may be blank. If incorrect, please contact your healthcare provider. Carry this list with you in case of emergency.           Current Medications     aspirin (ECOTRIN) 81 MG EC tablet Take 81 mg by mouth once daily.    blood sugar diagnostic Strp True Result glucose  strips check once daily    blood-glucose meter Misc True Results glucometer    cloNIDine (CATAPRES) 0.1 MG tablet Take 1 tablet (0.1 mg total) by mouth every morning.    clopidogrel (PLAVIX) 75 mg tablet Take 1 tablet (75 mg total) by mouth once daily.    clopidogrel (PLAVIX) 75 mg tablet TAKE ONE TABLET BY MOUTH ONCE DAILY    cyanocobalamin (VITAMIN B-12) 1000 MCG tablet Take 100 mcg by mouth once daily.    ferrous sulfate 325 mg (65 mg iron) Tab tablet Take 1 tablet (325 mg total) by mouth once daily.    furosemide (LASIX) 40 MG tablet TAKE ONE TABLET BY MOUTH EVERY MORNING    hydrOXYzine (ATARAX) 25 MG tablet Take 25 mg by mouth 2 (two) times daily as needed for Itching.    KLOR-CON M20 20 mEq tablet TAKE ONE TABLET BY MOUTH TWICE DAILY    lancets Misc True Result lancets check once daily    lisinopril (PRINIVIL,ZESTRIL) 40 MG tablet Take 1 tablet (40 mg total) by mouth once daily.    nifedipine (PROCARDIA-XL) 60 MG (OSM) 24 hr tablet TAKE ONE TABLET BY MOUTH ONCE DAILY    pravastatin (PRAVACHOL) 40 MG tablet Take 1 tablet (40 mg total) by mouth every evening.    vitamin D 1000 units Tab Take 185 mg by mouth once daily.    vits A,C,E-lutein-zeax-zn-emanuel 9,650 unit-195 mg-95 unit Cap Take 1 tablet by mouth 2 (two) times daily with meals.           Clinical Reference Information           Your Vitals Were     BP Pulse Temp Resp Height Weight    192/81 57 99.9 °F (37.7 °C) (Oral) 16 5' (1.524 m) 45.6 kg (100 lb 8.5 oz)    Last Period BMI             (LMP Unknown) 19.63 kg/m2         Blood Pressure          Most Recent Value    BP  (!)  192/81      Allergies as of 5/15/2017     No Known Drug Allergies      Immunizations Administered on Date of Encounter - 5/15/2017     None      Language Assistance Services     ATTENTION: Language assistance services are available, free of charge. Please call [phone].      ATENCIÓN: Si habla español, tiene a cates disposición servicios gratuitos de asistencia lingüística. Katherine  al [phone].     LYSSA Ý: N?u b?n nói Ti?ng Vi?t, có các d?ch v? h? tr? ngôn ng? mi?n phí dành cho b?n. G?i s? [phone].         [facility] complies with applicable Federal civil rights laws and does not discriminate on the basis of race, color, national origin, age, disability, or sex.

## 2017-05-20 DIAGNOSIS — E87.6 HYPOPOTASSEMIA: ICD-10-CM

## 2017-05-22 RX ORDER — POTASSIUM CHLORIDE 1500 MG/1
TABLET, EXTENDED RELEASE ORAL
Qty: 180 TABLET | Refills: 0 | Status: ON HOLD | OUTPATIENT
Start: 2017-05-22 | End: 2017-08-01

## 2017-05-29 ENCOUNTER — LAB VISIT (OUTPATIENT)
Dept: LAB | Facility: HOSPITAL | Age: 82
End: 2017-05-29
Attending: INTERNAL MEDICINE
Payer: MEDICARE

## 2017-05-29 ENCOUNTER — OFFICE VISIT (OUTPATIENT)
Dept: HEMATOLOGY/ONCOLOGY | Facility: CLINIC | Age: 82
End: 2017-05-29
Payer: MEDICARE

## 2017-05-29 VITALS
RESPIRATION RATE: 18 BRPM | SYSTOLIC BLOOD PRESSURE: 168 MMHG | DIASTOLIC BLOOD PRESSURE: 73 MMHG | HEART RATE: 48 BPM | HEIGHT: 60 IN | TEMPERATURE: 98 F

## 2017-05-29 DIAGNOSIS — D53.9 ANEMIA, MACROCYTIC: ICD-10-CM

## 2017-05-29 DIAGNOSIS — D69.6 THROMBOCYTOPENIA: Primary | ICD-10-CM

## 2017-05-29 DIAGNOSIS — I10 ESSENTIAL HYPERTENSION: ICD-10-CM

## 2017-05-29 DIAGNOSIS — I25.10 CORONARY ARTERY DISEASE, ANGINA PRESENCE UNSPECIFIED, UNSPECIFIED VESSEL OR LESION TYPE, UNSPECIFIED WHETHER NATIVE OR TRANSPLANTED HEART: ICD-10-CM

## 2017-05-29 DIAGNOSIS — N18.4 CKD (CHRONIC KIDNEY DISEASE), STAGE 4 (SEVERE): ICD-10-CM

## 2017-05-29 DIAGNOSIS — D64.9 ANEMIA, UNSPECIFIED TYPE: ICD-10-CM

## 2017-05-29 DIAGNOSIS — E80.6 HYPERBILIRUBINEMIA: ICD-10-CM

## 2017-05-29 DIAGNOSIS — R74.01 TRANSAMINITIS: ICD-10-CM

## 2017-05-29 PROCEDURE — 1126F AMNT PAIN NOTED NONE PRSNT: CPT | Mod: S$GLB,,, | Performed by: INTERNAL MEDICINE

## 2017-05-29 PROCEDURE — 99213 OFFICE O/P EST LOW 20 MIN: CPT | Mod: S$GLB,,, | Performed by: INTERNAL MEDICINE

## 2017-05-29 PROCEDURE — 99499 UNLISTED E&M SERVICE: CPT | Mod: S$GLB,,, | Performed by: INTERNAL MEDICINE

## 2017-05-29 PROCEDURE — 1159F MED LIST DOCD IN RCRD: CPT | Mod: S$GLB,,, | Performed by: INTERNAL MEDICINE

## 2017-05-29 PROCEDURE — 99999 PR PBB SHADOW E&M-EST. PATIENT-LVL III: CPT | Mod: PBBFAC,,, | Performed by: INTERNAL MEDICINE

## 2017-05-29 RX ORDER — PRAVASTATIN SODIUM 40 MG/1
TABLET ORAL
Qty: 90 TABLET | Refills: 3 | Status: SHIPPED | OUTPATIENT
Start: 2017-05-29 | End: 2017-08-31 | Stop reason: SDUPTHER

## 2017-05-29 NOTE — PROGRESS NOTES
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    Had light egg and cheese biscuit this am   And blood pressure meds      Patient is accompanied here today by her daughter.    SHe does have a history of stroke and carotid artery stenosis.   Daughter states she is sleeping more than 50 % of the day, not eating  She is tolerating meds lisinopril and Procardia for HTN    Diabetes appears well controlled with diet    Patient reports that she is not having any problems with dizziness, headaches or visual changes.      Current Outpatient Prescriptions:     aspirin (ECOTRIN) 81 MG EC tablet, Take 81 mg by mouth once daily., Disp: , Rfl:     blood sugar diagnostic Strp, True Result glucose strips check once daily, Disp: 100 each, Rfl: 3    blood-glucose meter Misc, True Results glucometer, Disp: 1 each, Rfl: 0    cloNIDine (CATAPRES) 0.1 MG tablet, Take 1 tablet (0.1 mg total) by mouth every morning., Disp: 30 tablet, Rfl: 2    clopidogrel (PLAVIX) 75 mg tablet, Take 1 tablet (75 mg total) by mouth once daily., Disp: 30 tablet, Rfl: 2    cyanocobalamin (VITAMIN B-12) 1000 MCG tablet, Take 100 mcg by mouth once daily., Disp: , Rfl:     ferrous sulfate 325 mg (65 mg iron) Tab tablet, Take 1 tablet (325 mg total) by mouth once daily., Disp: 90 tablet, Rfl: 0    fludrocortisone (FLORINEF) 0.1 mg Tab, TAKE ONE TABLET BY MOUTH ONCE DAILY, Disp: 90 tablet, Rfl: 0    furosemide (LASIX) 40 MG tablet, TAKE ONE TABLET BY MOUTH EVERY MORNING, Disp: 90 tablet, Rfl: 3    hydrOXYzine (ATARAX) 25 MG tablet, Take 25 mg by mouth 2 (two) times daily as needed for Itching., Disp: , Rfl:     KLOR-CON M20 20 mEq tablet, TAKE ONE TABLET BY MOUTH TWICE DAILY, Disp: 180 tablet, Rfl: 0    lancets Misc, True Result lancets check once daily, Disp: 100 each, Rfl: 3    lisinopril (PRINIVIL,ZESTRIL)  40 MG tablet, Take 1 tablet (40 mg total) by mouth once daily., Disp: 30 tablet, Rfl: 2    nifedipine (PROCARDIA-XL) 60 MG (OSM) 24 hr tablet, Take 1 tablet (60 mg total) by mouth once daily., Disp: 90 tablet, Rfl: 3    pravastatin (PRAVACHOL) 40 MG tablet, Take 1 tablet (40 mg total) by mouth every evening., Disp: 90 tablet, Rfl: 3    vitamin D 1000 units Tab, Take 185 mg by mouth once daily., Disp: , Rfl:     vits A,C,E-lutein-zeax-zn-emanuel 9,650 unit-195 mg-95 unit Cap, Take 1 tablet by mouth 2 (two) times daily with meals., Disp: , Rfl:   No current facility-administered medications for this visit.     REVIEW OF SYSTEMS:  GENERAL:  No fever or chills.  Weight stable increasing fatigue  HEENT:  No photophobia, rhinorrhea, sinus congestion, tinnitus, gingival   bleeding, oral ulcers, or sore throat.  RESPIRATORY:  No shortness of breath, cough, or wheezing.  GASTROINTESTINAL:  No abdominal pain, dysphagia, emesis, diarrhea, or   constipation.  No heartburn, abdominal distention, melena, or hematochezia.  GENITOURINARY:  No urinary frequency, hesitancy, dysuria, or hematuria.  MUSCULOSKELETAL:  No neck pain, back pain  NEUROLOGICAL:  No headaches,   Localized paresthesias  PSYCH:  No agitation, change in behavior, anxiety, or depression.      PHYSICAL EXAMINATION:  BP (!) 168/73   Pulse (!) 48   Temp 98 °F (36.7 °C) (Oral)   Resp 18   Ht 5' (1.524 m)   LMP  (LMP Unknown)     GENERAL:  Thin body habitus cachectic.  HEENT:  Temporal wasting.  PSYCH:  Pleasant affect today.  LUNGS:  Clear, no use of accessory muscles during respiration.  CARDIAC:  S1, S2. bradycardia  ABDOMEN:  Nondistended.  EXTREMITIES:  Thin, no cyanosis,positive  edema.  SKIN:  Warm, dry. POSITIVE tenting    LABS:      Lab Results   Component Value Date    WBC 5.10 05/29/2017    HGB 10.4 (L) 05/29/2017    HCT 30.9 (L) 05/29/2017    MCV 97 05/29/2017     (L) 05/29/2017       CMP  Sodium   Date Value Ref Range Status   05/29/2017 143  136 - 145 mmol/L Final     Potassium   Date Value Ref Range Status   05/29/2017 4.5 3.5 - 5.1 mmol/L Final     Chloride   Date Value Ref Range Status   05/29/2017 116 (H) 95 - 110 mmol/L Final     CO2   Date Value Ref Range Status   05/29/2017 17 (L) 23 - 29 mmol/L Final     Glucose   Date Value Ref Range Status   05/29/2017 156 (H) 70 - 110 mg/dL Final     BUN, Bld   Date Value Ref Range Status   05/29/2017 27 10 - 30 mg/dL Final     Creatinine   Date Value Ref Range Status   05/29/2017 1.2 0.5 - 1.4 mg/dL Final     Calcium   Date Value Ref Range Status   05/29/2017 9.1 8.7 - 10.5 mg/dL Final     Total Protein   Date Value Ref Range Status   05/29/2017 7.1 6.0 - 8.4 g/dL Final     Albumin   Date Value Ref Range Status   05/29/2017 2.9 (L) 3.5 - 5.2 g/dL Final     Total Bilirubin   Date Value Ref Range Status   05/29/2017 0.5 0.1 - 1.0 mg/dL Final     Comment:     For infants and newborns, interpretation of results should be based  on gestational age, weight and in agreement with clinical  observations.  Premature Infant recommended reference ranges:  Up to 24 hours.............<8.0 mg/dL  Up to 48 hours............<12.0 mg/dL  3-5 days..................<15.0 mg/dL  6-29 days.................<15.0 mg/dL       Alkaline Phosphatase   Date Value Ref Range Status   05/29/2017 77 55 - 135 U/L Final     AST   Date Value Ref Range Status   05/29/2017 27 10 - 40 U/L Final     ALT   Date Value Ref Range Status   05/29/2017 17 10 - 44 U/L Final     Anion Gap   Date Value Ref Range Status   05/29/2017 10 8 - 16 mmol/L Final     eGFR if    Date Value Ref Range Status   05/29/2017 45 (A) >60 mL/min/1.73 m^2 Final     eGFR if non    Date Value Ref Range Status   05/29/2017 39 (A) >60 mL/min/1.73 m^2 Final     Comment:     Calculation used to obtain the estimated glomerular filtration  rate (eGFR) is the CKD-EPI equation. Since race is unknown   in our information system, the eGFR values for    -American and Non--American patients are given   for each creatinine result.         IMPRESSION:  1.  Anemia stable MDS with renal disease, thrombocytopenia with no evidence of bleeding  2.  Hypoalbuminemia with no improvement  4.  History of stroke equaling a hypercoagulable condition  5.  Elevated kappa light chain   6.  Bradycardia  today asymptomatic  7.  Diabetes with neuropathy  8.  Carotid artery stenosis bilaterally : sam plavix  9.  Edema Stable with  Lasix  11. Thrombocytopenia with no evidence of bleeding: asked pt to incorporate folate in her diet and eat to help      No procrit needed today May 29   Continue allegra daily   Made her an appt with pcp for BP check  D/C all salt from diet  RTC 2 weeks with cbc and diff and cmp

## 2017-05-30 RX ORDER — LISINOPRIL 40 MG/1
TABLET ORAL
Qty: 30 TABLET | Refills: 0 | Status: SHIPPED | OUTPATIENT
Start: 2017-05-30 | End: 2017-07-05 | Stop reason: SDUPTHER

## 2017-05-30 RX ORDER — CLOPIDOGREL BISULFATE 75 MG/1
TABLET ORAL
Qty: 30 TABLET | Refills: 0 | Status: SHIPPED | OUTPATIENT
Start: 2017-05-30 | End: 2017-07-05 | Stop reason: SDUPTHER

## 2017-06-07 ENCOUNTER — DOCUMENTATION ONLY (OUTPATIENT)
Dept: FAMILY MEDICINE | Facility: CLINIC | Age: 82
End: 2017-06-07

## 2017-06-07 ENCOUNTER — TELEPHONE (OUTPATIENT)
Dept: FAMILY MEDICINE | Facility: CLINIC | Age: 82
End: 2017-06-07

## 2017-06-07 NOTE — PROGRESS NOTES
Pre-Visit Chart Review  For Appointment Scheduled on 6/8/17.    Health Maintenance Due   Topic Date Due    Eye Exam  07/25/2015    DEXA SCAN  11/17/2016    Foot Exam  06/23/2017

## 2017-06-07 NOTE — TELEPHONE ENCOUNTER
Called daughter and rescheduled appt with Dr. Pnik from 6/12/17 to 6/8/17. Dr. Pink is not in clinic on 6/12/17. Thanks, Linnea

## 2017-06-08 ENCOUNTER — OFFICE VISIT (OUTPATIENT)
Dept: FAMILY MEDICINE | Facility: CLINIC | Age: 82
End: 2017-06-08
Payer: MEDICARE

## 2017-06-08 VITALS
DIASTOLIC BLOOD PRESSURE: 47 MMHG | HEIGHT: 60 IN | SYSTOLIC BLOOD PRESSURE: 110 MMHG | HEART RATE: 49 BPM | BODY MASS INDEX: 19.87 KG/M2 | WEIGHT: 101.19 LBS | TEMPERATURE: 98 F

## 2017-06-08 DIAGNOSIS — R63.6 UNDERWEIGHT: ICD-10-CM

## 2017-06-08 DIAGNOSIS — E78.5 HYPERLIPIDEMIA ASSOCIATED WITH TYPE 2 DIABETES MELLITUS: ICD-10-CM

## 2017-06-08 DIAGNOSIS — H04.202 WATERING OF LEFT EYE: ICD-10-CM

## 2017-06-08 DIAGNOSIS — M81.0 OSTEOPOROSIS, UNSPECIFIED OSTEOPOROSIS TYPE, UNSPECIFIED PATHOLOGICAL FRACTURE PRESENCE: ICD-10-CM

## 2017-06-08 DIAGNOSIS — E11.21 TYPE 2 DIABETES MELLITUS WITH DIABETIC NEPHROPATHY, WITHOUT LONG-TERM CURRENT USE OF INSULIN: Primary | ICD-10-CM

## 2017-06-08 DIAGNOSIS — E11.69 HYPERLIPIDEMIA ASSOCIATED WITH TYPE 2 DIABETES MELLITUS: ICD-10-CM

## 2017-06-08 DIAGNOSIS — E11.59 HYPERTENSION ASSOCIATED WITH DIABETES: ICD-10-CM

## 2017-06-08 DIAGNOSIS — I15.2 HYPERTENSION ASSOCIATED WITH DIABETES: ICD-10-CM

## 2017-06-08 PROCEDURE — 1126F AMNT PAIN NOTED NONE PRSNT: CPT | Mod: S$GLB,,, | Performed by: FAMILY MEDICINE

## 2017-06-08 PROCEDURE — 99214 OFFICE O/P EST MOD 30 MIN: CPT | Mod: S$GLB,,, | Performed by: FAMILY MEDICINE

## 2017-06-08 PROCEDURE — 99999 PR PBB SHADOW E&M-EST. PATIENT-LVL III: CPT | Mod: PBBFAC,,, | Performed by: FAMILY MEDICINE

## 2017-06-08 PROCEDURE — 1159F MED LIST DOCD IN RCRD: CPT | Mod: S$GLB,,, | Performed by: FAMILY MEDICINE

## 2017-06-08 PROCEDURE — 99499 UNLISTED E&M SERVICE: CPT | Mod: S$GLB,,, | Performed by: FAMILY MEDICINE

## 2017-06-08 RX ORDER — FUROSEMIDE 40 MG/1
40 TABLET ORAL EVERY MORNING
Qty: 90 TABLET | Refills: 3 | Status: ON HOLD | OUTPATIENT
Start: 2017-06-08 | End: 2017-08-01

## 2017-06-08 RX ORDER — MINERAL OIL
180 ENEMA (ML) RECTAL DAILY PRN
COMMUNITY
End: 2017-07-31

## 2017-06-08 RX ORDER — ATORVASTATIN CALCIUM 20 MG/1
20 TABLET, FILM COATED ORAL DAILY
COMMUNITY
End: 2017-06-08

## 2017-06-08 NOTE — PROGRESS NOTES
CHIEF COMPLAINT: follow up      HISTORY OF PRESENT ILLNESS:  Jaqui Odell is a 89 y.o. female who presents to clinic forfollow up.  She is accompanied by a family member.     1. Type 2 DM: recent HgA1c was 4.6%.  Her amaryl has been discontinued. She is up to date on her pneumovax, she is due for prevnar 13. She is on a statin, ASA. She follows up with podiatry.    2 HTN: patient is established with cardiology. She is on procardia, lisinopril, lasix,catapres, potassium supplementation.     3. Hyperlipidemia: she is on pravastatin, ASA.  She denies any myalgia, dark colored urine.    4. They have noticed worsening left eye watering.  She has had difficulty with this eye since her stroke.  She is no longer established with optometry/ophthalmology.       REVIEW OF SYSTEMS:  The patient denies any fever, chills, night sweats, headaches, vision changes, difficulty speaking or swallowing, decreased hearing, weight loss, weight gain, chest pain, palpitations, shortness of breath, cough, nausea, vomiting, abdominal pain, dysuria, diarrhea, constipation, hematuria, hematochezia, melena, changes in her hair, skin, nails, numbness or weakness in her extremities, erythema, pain or swelling over any of her joints, myalgias, swollen glands, easy bruising, fatigue.    MEDICATIONS:   Reviewed and/or reconciled in EPIC    ALLERGIES:  Reviewed and/or reconciled in [facility]    PAST MEDICAL/SURGICAL HISTORY:   Past Medical History:   Diagnosis Date    Callus     Carotid artery occlusion     Diabetes mellitus     Diabetes mellitus, type 2     Hyperlipidemia     Hypertension     Stroke     Syncope and collapse       Past Surgical History:   Procedure Laterality Date    adrenal insufficiency      CARDIAC CATHETERIZATION      CORONARY ANGIOPLASTY      HYSTERECTOMY         FAMILY HISTORY:  No family history on file.    SOCIAL HISTORY:    Social History     Social History    Marital status:      Spouse name: N/A     Number of children: N/A    Years of education: N/A     Occupational History    Not on file.     Social History Main Topics    Smoking status: Never Smoker    Smokeless tobacco: Never Used    Alcohol use No    Drug use: No    Sexual activity: No     Other Topics Concern    Not on file     Social History Narrative    No narrative on file       PHYSICAL EXAM:  VITAL SIGNS:   Vitals:    06/08/17 1000   Weight: 45.9 kg (101 lb 3.1 oz)   Height: 5' (1.524 m)     GENERAL:  Patient appears underweight, sitting in wheelchair, in no acute distress.  HEENT:  Atraumatic, normocephalic, PERRLA, EOMI, no conjunctival injection, sclerae are anicteric, normal external auditory canals,TMs clear b/l, gross hearing intact to whisper, MMM, no oropharygneal erythema or exudate.  NECK:  Supple, normal ROM, trachea is midline , no supraclavicular or cervical LAD or masses palpated.   CARDIOVASCULAR:  RRR, normal S1 and S2, no m/r/g.  RESPIRATORY:  CTA b/l, no wheezes, rhonchi, rales.  No increased work of breathing, no  use of accessory muscles.  ABDOMEN:  Soft, nontender, nondistended, normoactive bowel sounds in all four quadrants, no rebound or guarding, no HSM or masses palpated.  Normal percussion.  EXTREMITIES:  2+ DP pulses b/l, no edema.  SKIN:  Warm, no lesions on exposed skin.  NEUROMUSCULAR:  Cranial nerves II-XII grossly intact. No clubbing or cyanosis of digits/nails.   PSYCH:  Patient is alert and oriented to person, time, place. She is appropriately dressed and groomed. There is normal eye contact. Rate and tone of speech is normal. Normal insight, judgement. Normal thought content and process.     LABORATORY/IMAGING STUDIES: pending    ASSESSMENT/PLAN: This is a 89 y.o. female who presents to clinic for follow up  1. Type 2 DM, controlled: see below.   2  HTN: see below  3. Hyperlipidemia: obtain lipid panel  4. Left eye watering: refer to optometry.  5. Osteoporosis: vitamin D level  7. Underweight: see  below    Patient readiness: acceptance and barriers:none    During the course of the visit the patient was educated and counseled about the following:     Diabetes: HgA1c if still within normal range, will take diagnosis of type 2 DM off of chart and closely monitor blood sugars  Hypertension:   Medication: no change.   Underweight: follow up with oncology    Goals: Diabetes: Maintain Hemoglobin A1C below 7 and Hypertension: Reduce Blood Pressure underweight: increase calorie intake/weight gain    Did patient meet goals/outcomes: Yes HTNadn diabetes, no underweight.    The following self management tools provided: declined    Patient Instructions (the written plan) was given to the patient/family.     Time spent with patient: 30 minutes    FOLLOW UP: 6 months    Ruth Pink MD

## 2017-06-13 ENCOUNTER — OFFICE VISIT (OUTPATIENT)
Dept: OPTOMETRY | Facility: CLINIC | Age: 82
End: 2017-06-13
Payer: MEDICARE

## 2017-06-13 ENCOUNTER — TELEPHONE (OUTPATIENT)
Dept: HEMATOLOGY/ONCOLOGY | Facility: CLINIC | Age: 82
End: 2017-06-13

## 2017-06-13 DIAGNOSIS — H40.013 OPEN ANGLE WITH BORDERLINE FINDINGS OF BOTH EYES: ICD-10-CM

## 2017-06-13 DIAGNOSIS — H04.123 DRY EYE SYNDROME, BILATERAL: ICD-10-CM

## 2017-06-13 DIAGNOSIS — H52.7 REFRACTIVE ERROR: ICD-10-CM

## 2017-06-13 DIAGNOSIS — H57.89 IRRITATION OF LEFT EYE: Primary | ICD-10-CM

## 2017-06-13 PROCEDURE — 92004 COMPRE OPH EXAM NEW PT 1/>: CPT | Mod: S$GLB,,, | Performed by: OPTOMETRIST

## 2017-06-13 PROCEDURE — 99999 PR PBB SHADOW E&M-EST. PATIENT-LVL I: CPT | Mod: PBBFAC,,, | Performed by: OPTOMETRIST

## 2017-06-13 NOTE — LETTER
June 13, 2017      Ruth Pink MD  [address]  [place] [address]           [facility]  [address] [place] [address] Drive [address]  [facility] [address]  Phone: [phone]          Patient: Jaqui Odell   MR Number: [id number]   YOB: 1924   Date of Visit: 6/13/2017       Dear Dr. Ruth Pink:    Thank you for referring Jaqui Odell to me for evaluation. Attached you will find relevant portions of my assessment and plan of care.    If you have questions, please do not hesitate to call me. I look forward to following Jaqui Odell along with you.    Sincerely,    Samir Alcazar, OD    Enclosure  CC:  No Recipients    If you would like to receive this communication electronically, please contact [email or web address] or [phone] to request more information on [employer] Link access.    For providers and/or their staff who would like to refer a patient to [facility], please contact us through our one-stop-shop provider referral line, [facility], at [phone].    If you feel you have received this communication in error or would no longer like to receive these types of communications, please e-mail [email or web address]

## 2017-06-13 NOTE — PROGRESS NOTES
HPI     Presenting Complaint: Pt here today because care giver states her eyes   have been red over the last week. She states eyes always tear.     Pt care giver states she will not wear glasses for distance or reading.     DLE: 3 years    (-) No drops in eyes    (+) Pt has recent seasonal allergies over the last week.     (-) headaches  (-) diplopia   (-) flashes / (-) floaters    Hemoglobin A1C       Date                     Value               Ref Range             Status                12/16/2016               4.4 (L)             4.5 - 6.2 %           Final                  08/25/2016               4.6                 4.5 - 6.2 %           Final                  10/03/2015               5.0                 4.5 - 6.2 %           Final            ----------        Last edited by Samir Alcazar, OD on 6/13/2017  2:18 PM. (History)        ROS     Positive for: Endocrine (DM type 2), Cardiovascular (HTN), Eyes    Negative for: Constitutional, Gastrointestinal, Neurological, Skin,   Genitourinary, Musculoskeletal, HENT, Respiratory, Psychiatric,   Allergic/Imm, Heme/Lymph    Last edited by Samir Alcazar, OD on 6/13/2017  2:11 PM. (History)        Assessment /Plan     For exam results, see Encounter Report.    Irritation of left eye    Dry eye syndrome, bilateral    Open angle with borderline findings of both eyes    Refractive error      Irritation OS with epiphora. ROMAINE OU. Discussed findings and treatment options. Recommend OTC Refresh gel drops qid OU, caution transient blurring of vision with gel use. Return if no relief or worsening of symptoms.     Larger than average C/D ratio OU with fam hx of glc. NTG suspect, low normal IOP. Pt unable to perform glc testing. Discussed treatment options with pt daughter, defers drops. Discussed possible affects of untreated glc, pt and daughter reports good understanding.      Pt has no complaints with vision, denies refraction, does not like to wear glasses. Return prn for spec  Rx.      RTC in 1 year for comprehensive eye exam, or sooner prn.

## 2017-06-13 NOTE — TELEPHONE ENCOUNTER
Called Zina and notified her that Dr. Shay is out 6/19/17, 6/20/17, and 6/21/17 so pts lab and Dr. Shay appt has been rescheduled to 6/27/17 @ 1670 and 1450.  Zina verbalizes understanding.

## 2017-06-23 DIAGNOSIS — I10 ESSENTIAL HYPERTENSION: ICD-10-CM

## 2017-06-26 ENCOUNTER — OFFICE VISIT (OUTPATIENT)
Dept: PODIATRY | Facility: CLINIC | Age: 82
End: 2017-06-26
Payer: MEDICARE

## 2017-06-26 VITALS — HEIGHT: 60 IN | BODY MASS INDEX: 19.87 KG/M2 | WEIGHT: 101.19 LBS

## 2017-06-26 DIAGNOSIS — B35.1 ONYCHOMYCOSIS DUE TO DERMATOPHYTE: ICD-10-CM

## 2017-06-26 DIAGNOSIS — E11.21 TYPE 2 DIABETES MELLITUS WITH DIABETIC NEPHROPATHY, WITHOUT LONG-TERM CURRENT USE OF INSULIN: Primary | ICD-10-CM

## 2017-06-26 PROCEDURE — 17999 UNLISTD PX SKN MUC MEMB SUBQ: CPT | Mod: CSM,,, | Performed by: PODIATRIST

## 2017-06-26 PROCEDURE — 99999 PR PBB SHADOW E&M-EST. PATIENT-LVL III: CPT | Mod: PBBFAC,,, | Performed by: PODIATRIST

## 2017-06-26 PROCEDURE — 1159F MED LIST DOCD IN RCRD: CPT | Mod: S$GLB,,, | Performed by: PODIATRIST

## 2017-06-26 PROCEDURE — 99212 OFFICE O/P EST SF 10 MIN: CPT | Mod: S$GLB,,, | Performed by: PODIATRIST

## 2017-06-26 PROCEDURE — 99499 UNLISTED E&M SERVICE: CPT | Mod: S$GLB,,, | Performed by: PODIATRIST

## 2017-06-26 PROCEDURE — 1126F AMNT PAIN NOTED NONE PRSNT: CPT | Mod: S$GLB,,, | Performed by: PODIATRIST

## 2017-06-26 NOTE — PROGRESS NOTES
Subjective:      Patient ID: Jaqui Odell is a 89 y.o. female.    Chief Complaint: Follow-up    Jaqui is a 89 y.o. female who presents to the clinic for evaluation and treatment of high risk feet. Jaqui has a past medical history of Callus; Carotid artery occlusion; Diabetes mellitus; Diabetes mellitus, type 2; Hyperlipidemia; Hypertension; Stroke; and Syncope and collapse. The patient's chief complaint is long, thick toenails.  Gradual onset, worsening over past several weeks, aggravated by increased weight bearing, shoe gear, pressure. Periodic debridement helps symptoms.  Diabetes, increased risk amputation needing evaluation/management/optomization of foot care.    This patient has documented high risk feet requiring routine maintenance secondary to diabetes mellitis and those secondary complications of diabetes, as mentioned..    PCP: Ruth Pink MD    Date Last Seen by PCP:   Chief Complaint   Patient presents with    Follow-up         Current shoe gear:  Affected Foot: Casual shoes     Unaffected Foot: Casual shoes    Hemoglobin A1C   Date Value Ref Range Status   12/16/2016 4.4 (L) 4.5 - 6.2 % Final     Comment:     According to ADA guidelines, hemoglobin A1C <7.0% represents  optimal control in non-pregnant diabetic patients.  Different  metrics may apply to specific populations.   Standards of Medical Care in Diabetes - 2016.  For the purpose of screening for the presence of diabetes:  <5.7%     Consistent with the absence of diabetes  5.7-6.4%  Consistent with increasing risk for diabetes   (prediabetes)  >or=6.5%  Consistent with diabetes  Currently no consensus exists for use of hemoglobin A1C  for diagnosis of diabetes for children.     08/25/2016 4.6 4.5 - 6.2 % Final     Comment:     According to ADA guidelines, hemoglobin A1C <7.0% represents  optimal control in non-pregnant diabetic patients.  Different  metrics may apply to specific populations.   Standards of Medical Care in Diabetes -  2016.  For the purpose of screening for the presence of diabetes:  <5.7%     Consistent with the absence of diabetes  5.7-6.4%  Consistent with increasing risk for diabetes   (prediabetes)  >or=6.5%  Consistent with diabetes  Currently no consensus exists for use of hemoglobin A1C  for diagnosis of diabetes for children.     10/03/2015 5.0 4.5 - 6.2 % Final       Review of Systems   Constitution: Negative for chills, diaphoresis, fever, malaise/fatigue and night sweats.   Cardiovascular: Negative for claudication, cyanosis, leg swelling and syncope.   Skin: Positive for nail changes. Negative for color change, dry skin, rash, suspicious lesions and unusual hair distribution.   Musculoskeletal: Negative for falls, joint pain, joint swelling, muscle cramps, muscle weakness and stiffness.   Gastrointestinal: Negative for constipation, diarrhea, nausea and vomiting.   Neurological: Positive for sensory change. Negative for brief paralysis, disturbances in coordination, focal weakness, numbness, paresthesias and tremors.           Objective:      Physical Exam   Constitutional: She is oriented to person, place, and time. She appears well-developed and well-nourished. She is cooperative.   Oriented to time, place, and person.   Cardiovascular:   Pulses:       Dorsalis pedis pulses are 1+ on the right side, and 1+ on the left side.        Posterior tibial pulses are 1+ on the right side, and 1+ on the left side.   Capillary fill time 3-5 seconds.  All toes warm to touch.      negative lower extremity edema bilateral.    Negative elevational pallor and dependent rubor bilateral.     Musculoskeletal:   Normal angle, base, station of gait. Decreased stride length, early heel off, moderately propulsive toe off bilateral.  Stands for transfers only.    All ten toes without clubbing, cyanosis, or signs of ischemia.      Right 5th toe varus deformity from prior surgery.  Left 5th toe hammertoe not reducible without symptom  today.    No pain to palpation bilateral lower extremities.      Range of motion, stability, muscle strength, and muscle tone are age and health appropriate normal bilateral feet and legs.       Lymphadenopathy:   Negative lymphadenopathy bilateral popliteal fossa and tarsal tunnel.     Neurological: She is alert and oriented to person, place, and time. She has normal strength. She is not disoriented. She displays no atrophy and no tremor. A sensory deficit is present. She exhibits normal muscle tone.   Reflex Scores:       Patellar reflexes are 2+ on the right side and 2+ on the left side.       Achilles reflexes are 2+ on the right side and 2+ on the left side.  Decreased/absent vibratory sensation bilateral feet to 128Hz tuning fork.     Skin: Skin is warm, dry and intact. No abrasion, no bruising, no burn, no ecchymosis, no laceration, no lesion, no petechiae and no rash noted. She is not diaphoretic. No cyanosis or erythema. No pallor. Nails show no clubbing.   Skin thin, atrophic, with decreased density and distribution of pedal hair bilateral, but without hyperpigmentation, remington discoloration,  ulcers, masses, nodules or cords palpated bilateral feet and legs.      Toenails 1st,  bilateral are hypertrophic thickened 2-3 mm, dystrophic, discolored tanish brown with tan, gray crumbly subungual debris.  Tender to distal nail plate pressure, without periungual skin abnormality of each.  Other toenails long, not painful or fungal.               Assessment:       Encounter Diagnoses   Name Primary?    Type 2 diabetes mellitus with diabetic nephropathy, without long-term current use of insulin Yes    Onychomycosis due to dermatophyte          Plan:       Jaqui was seen today for follow-up.    Diagnoses and all orders for this visit:    Type 2 diabetes mellitus with diabetic nephropathy, without long-term current use of insulin    Onychomycosis due to dermatophyte      I counseled the patient on her conditions,  their implications and medical management.        The patient has received literature on basic diabetic foot care.  Patient will inspect feet daily, wear protective shoe gear when ambulatory, and apply moisturizer to skin as needed to maintain elasticity and help prevent ulceration.    The patient has received literature on basic diabetic foot care.  Patient will inspect feet daily, wear protective shoe gear when ambulatory, and apply moisturizer to skin as needed to maintain elasticity and help prevent ulceration.    Declines penlac, diabetic shoes, heat molded inserts.  Declines diabetic shoes.  Discussed and patient verbally acknowledges the increased risk of ulceration, infection, amputaiton, sepsis, and death.    Non covered foot care:    With the patient's permission, I debrided all ten toenails with a sterile nipper and curette, removing all offending nail and debris.  Patient tolerated the procedure well and related significant relief.                Return in about 1 year (around 6/26/2018) for AR exam.

## 2017-06-27 ENCOUNTER — OFFICE VISIT (OUTPATIENT)
Dept: HEMATOLOGY/ONCOLOGY | Facility: CLINIC | Age: 82
End: 2017-06-27
Payer: MEDICARE

## 2017-06-27 ENCOUNTER — LAB VISIT (OUTPATIENT)
Dept: LAB | Facility: HOSPITAL | Age: 82
End: 2017-06-27
Attending: INTERNAL MEDICINE
Payer: MEDICARE

## 2017-06-27 VITALS
SYSTOLIC BLOOD PRESSURE: 203 MMHG | HEIGHT: 60 IN | RESPIRATION RATE: 17 BRPM | BODY MASS INDEX: 18.62 KG/M2 | TEMPERATURE: 98 F | DIASTOLIC BLOOD PRESSURE: 84 MMHG | HEART RATE: 78 BPM | WEIGHT: 94.81 LBS

## 2017-06-27 DIAGNOSIS — R74.01 TRANSAMINITIS: ICD-10-CM

## 2017-06-27 DIAGNOSIS — D64.9 ANEMIA, UNSPECIFIED TYPE: ICD-10-CM

## 2017-06-27 DIAGNOSIS — D61.89 ANEMIA DUE TO OTHER BONE MARROW FAILURE: ICD-10-CM

## 2017-06-27 DIAGNOSIS — E80.6 HYPERBILIRUBINEMIA: ICD-10-CM

## 2017-06-27 DIAGNOSIS — E78.5 HYPERLIPIDEMIA ASSOCIATED WITH TYPE 2 DIABETES MELLITUS: ICD-10-CM

## 2017-06-27 DIAGNOSIS — M81.0 OSTEOPOROSIS, UNSPECIFIED OSTEOPOROSIS TYPE, UNSPECIFIED PATHOLOGICAL FRACTURE PRESENCE: ICD-10-CM

## 2017-06-27 DIAGNOSIS — R79.89 ELEVATED FERRITIN: Primary | ICD-10-CM

## 2017-06-27 DIAGNOSIS — D46.9 MDS (MYELODYSPLASTIC SYNDROME): ICD-10-CM

## 2017-06-27 DIAGNOSIS — E11.69 HYPERLIPIDEMIA ASSOCIATED WITH TYPE 2 DIABETES MELLITUS: ICD-10-CM

## 2017-06-27 DIAGNOSIS — D64.9 ANEMIA, UNSPECIFIED TYPE: Primary | ICD-10-CM

## 2017-06-27 LAB
25(OH)D3+25(OH)D2 SERPL-MCNC: 35 NG/ML
ALBUMIN SERPL BCP-MCNC: 2.9 G/DL
ALP SERPL-CCNC: 84 U/L
ALT SERPL W/O P-5'-P-CCNC: 20 U/L
ANION GAP SERPL CALC-SCNC: 12 MMOL/L
AST SERPL-CCNC: 33 U/L
BILIRUB SERPL-MCNC: 0.5 MG/DL
BUN SERPL-MCNC: 20 MG/DL
CALCIUM SERPL-MCNC: 9.2 MG/DL
CHLORIDE SERPL-SCNC: 113 MMOL/L
CHOLEST/HDLC SERPL: 3.2 {RATIO}
CO2 SERPL-SCNC: 17 MMOL/L
CREAT SERPL-MCNC: 0.9 MG/DL
EST. GFR  (AFRICAN AMERICAN): >60 ML/MIN/1.73 M^2
EST. GFR  (NON AFRICAN AMERICAN): 56 ML/MIN/1.73 M^2
ESTIMATED AVG GLUCOSE: 108 MG/DL
GLUCOSE SERPL-MCNC: 123 MG/DL
HBA1C MFR BLD HPLC: 5.4 %
HDL/CHOLESTEROL RATIO: 31.1 %
HDLC SERPL-MCNC: 148 MG/DL
HDLC SERPL-MCNC: 46 MG/DL
LDLC SERPL CALC-MCNC: 83.8 MG/DL
NONHDLC SERPL-MCNC: 102 MG/DL
POTASSIUM SERPL-SCNC: 4.1 MMOL/L
PROT SERPL-MCNC: 7.4 G/DL
SODIUM SERPL-SCNC: 142 MMOL/L
TRIGL SERPL-MCNC: 91 MG/DL

## 2017-06-27 PROCEDURE — 99499 UNLISTED E&M SERVICE: CPT | Mod: S$GLB,,, | Performed by: INTERNAL MEDICINE

## 2017-06-27 PROCEDURE — 82306 VITAMIN D 25 HYDROXY: CPT

## 2017-06-27 PROCEDURE — 80061 LIPID PANEL: CPT | Mod: 91

## 2017-06-27 PROCEDURE — 99999 PR PBB SHADOW E&M-EST. PATIENT-LVL III: CPT | Mod: PBBFAC,,, | Performed by: INTERNAL MEDICINE

## 2017-06-27 PROCEDURE — 1126F AMNT PAIN NOTED NONE PRSNT: CPT | Mod: S$GLB,,, | Performed by: INTERNAL MEDICINE

## 2017-06-27 PROCEDURE — 36415 COLL VENOUS BLD VENIPUNCTURE: CPT

## 2017-06-27 PROCEDURE — 99214 OFFICE O/P EST MOD 30 MIN: CPT | Mod: S$GLB,,, | Performed by: INTERNAL MEDICINE

## 2017-06-27 PROCEDURE — 1159F MED LIST DOCD IN RCRD: CPT | Mod: S$GLB,,, | Performed by: INTERNAL MEDICINE

## 2017-06-27 PROCEDURE — 83036 HEMOGLOBIN GLYCOSYLATED A1C: CPT

## 2017-06-27 PROCEDURE — 80053 COMPREHEN METABOLIC PANEL: CPT

## 2017-06-27 RX ORDER — CLONIDINE HYDROCHLORIDE 0.1 MG/1
TABLET ORAL
Qty: 30 TABLET | Refills: 0 | Status: SHIPPED | OUTPATIENT
Start: 2017-06-27 | End: 2017-07-19 | Stop reason: SDUPTHER

## 2017-06-27 RX ORDER — DEFERASIROX 500 MG/1
500 TABLET, FOR SUSPENSION ORAL
Qty: 30 TABLET | Refills: 2 | Status: SHIPPED | OUTPATIENT
Start: 2017-06-27 | End: 2017-07-05 | Stop reason: HOSPADM

## 2017-06-27 NOTE — PROGRESS NOTES
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    Had light egg and cheese biscuit this am   And blood pressure meds      Patient is accompanied here today by her daughter.    SHe does have a history of stroke and carotid artery stenosis.   Daughter states she is sleeping more than 50 % of the day, not eating  She is tolerating meds lisinopril and Procardia for HTN    Diabetes appears well controlled with diet    Patient reports that she is not having any problems with dizziness, headaches or visual changes.      Current Outpatient Prescriptions:     aspirin (ECOTRIN) 81 MG EC tablet, Take 81 mg by mouth once daily., Disp: , Rfl:     blood sugar diagnostic Strp, True Result glucose strips check once daily, Disp: 100 each, Rfl: 3    blood-glucose meter Misc, True Results glucometer, Disp: 1 each, Rfl: 0    cloNIDine (CATAPRES) 0.1 MG tablet, Take 1 tablet (0.1 mg total) by mouth every morning., Disp: 30 tablet, Rfl: 2    clopidogrel (PLAVIX) 75 mg tablet, Take 1 tablet (75 mg total) by mouth once daily., Disp: 30 tablet, Rfl: 2    cyanocobalamin (VITAMIN B-12) 1000 MCG tablet, Take 100 mcg by mouth once daily., Disp: , Rfl:     ferrous sulfate 325 mg (65 mg iron) Tab tablet, Take 1 tablet (325 mg total) by mouth once daily., Disp: 90 tablet, Rfl: 0    fludrocortisone (FLORINEF) 0.1 mg Tab, TAKE ONE TABLET BY MOUTH ONCE DAILY, Disp: 90 tablet, Rfl: 0    furosemide (LASIX) 40 MG tablet, TAKE ONE TABLET BY MOUTH EVERY MORNING, Disp: 90 tablet, Rfl: 3    hydrOXYzine (ATARAX) 25 MG tablet, Take 25 mg by mouth 2 (two) times daily as needed for Itching., Disp: , Rfl:     KLOR-CON M20 20 mEq tablet, TAKE ONE TABLET BY MOUTH TWICE DAILY, Disp: 180 tablet, Rfl: 0    lancets Misc, True Result lancets check once daily, Disp: 100 each, Rfl: 3    lisinopril (PRINIVIL,ZESTRIL)  40 MG tablet, Take 1 tablet (40 mg total) by mouth once daily., Disp: 30 tablet, Rfl: 2    nifedipine (PROCARDIA-XL) 60 MG (OSM) 24 hr tablet, Take 1 tablet (60 mg total) by mouth once daily., Disp: 90 tablet, Rfl: 3    pravastatin (PRAVACHOL) 40 MG tablet, Take 1 tablet (40 mg total) by mouth every evening., Disp: 90 tablet, Rfl: 3    vitamin D 1000 units Tab, Take 185 mg by mouth once daily., Disp: , Rfl:     vits A,C,E-lutein-zeax-zn-emanuel 9,650 unit-195 mg-95 unit Cap, Take 1 tablet by mouth 2 (two) times daily with meals., Disp: , Rfl:   No current facility-administered medications for this visit.     REVIEW OF SYSTEMS:  GENERAL:  No fever or chills.  Weight stable increasing fatigue  HEENT:  No photophobia, rhinorrhea, sinus congestion, tinnitus, gingival   bleeding, oral ulcers, or sore throat.  RESPIRATORY:  No shortness of breath, cough, or wheezing.  GASTROINTESTINAL:  No abdominal pain, dysphagia, emesis, diarrhea, or   constipation.  No heartburn, abdominal distention, melena, or hematochezia.  GENITOURINARY:  No urinary frequency, hesitancy, dysuria, or hematuria.  MUSCULOSKELETAL:  No neck pain, back pain  NEUROLOGICAL:  No headaches,   Localized paresthesias  PSYCH:  No agitation, change in behavior, anxiety, or depression.      PHYSICAL EXAMINATION:  BP (!) 203/84   Pulse 78   Temp 98.4 °F (36.9 °C) (Oral)   Resp 17   Ht 5' (1.524 m)   Wt 43 kg (94 lb 12.8 oz)   LMP  (LMP Unknown)   BMI 18.51 kg/m²     GENERAL:  Thin body habitus cachectic.  HEENT:  Temporal wasting.  PSYCH:  Pleasant affect today.  LUNGS:  Clear, no use of accessory muscles during respiration.  CARDIAC:  S1, S2. bradycardia  ABDOMEN:  Nondistended.  EXTREMITIES:  Thin, no cyanosis,positive  edema.  SKIN:  Warm, dry. POSITIVE tenting    LABS:      Lab Results   Component Value Date    WBC 5.10 05/29/2017    HGB 10.4 (L) 05/29/2017    HCT 30.9 (L) 05/29/2017    MCV 97 05/29/2017     (L) 05/29/2017       CMP  Sodium    Date Value Ref Range Status   06/27/2017 142 136 - 145 mmol/L Final     Potassium   Date Value Ref Range Status   06/27/2017 4.1 3.5 - 5.1 mmol/L Final     Chloride   Date Value Ref Range Status   06/27/2017 113 (H) 95 - 110 mmol/L Final     CO2   Date Value Ref Range Status   06/27/2017 17 (L) 23 - 29 mmol/L Final     Glucose   Date Value Ref Range Status   06/27/2017 123 (H) 70 - 110 mg/dL Final     BUN, Bld   Date Value Ref Range Status   06/27/2017 20 10 - 30 mg/dL Final     Creatinine   Date Value Ref Range Status   06/27/2017 0.9 0.5 - 1.4 mg/dL Final     Calcium   Date Value Ref Range Status   06/27/2017 9.2 8.7 - 10.5 mg/dL Final     Total Protein   Date Value Ref Range Status   06/27/2017 7.4 6.0 - 8.4 g/dL Final     Albumin   Date Value Ref Range Status   06/27/2017 2.9 (L) 3.5 - 5.2 g/dL Final     Total Bilirubin   Date Value Ref Range Status   06/27/2017 0.5 0.1 - 1.0 mg/dL Final     Comment:     For infants and newborns, interpretation of results should be based  on gestational age, weight and in agreement with clinical  observations.  Premature Infant recommended reference ranges:  Up to 24 hours.............<8.0 mg/dL  Up to 48 hours............<12.0 mg/dL  3-5 days..................<15.0 mg/dL  6-29 days.................<15.0 mg/dL       Alkaline Phosphatase   Date Value Ref Range Status   06/27/2017 84 55 - 135 U/L Final     AST   Date Value Ref Range Status   06/27/2017 33 10 - 40 U/L Final     ALT   Date Value Ref Range Status   06/27/2017 20 10 - 44 U/L Final     Anion Gap   Date Value Ref Range Status   06/27/2017 12 8 - 16 mmol/L Final     eGFR if    Date Value Ref Range Status   06/27/2017 >60 >60 mL/min/1.73 m^2 Final     eGFR if non    Date Value Ref Range Status   06/27/2017 56 (A) >60 mL/min/1.73 m^2 Final     Comment:     Calculation used to obtain the estimated glomerular filtration  rate (eGFR) is the CKD-EPI equation. Since race is unknown   in our  information system, the eGFR values for   -American and Non--American patients are given   for each creatinine result.      ferritin level was 20/1/40 and is decreased to 1191  Iron is 60 as it was 5 months ago in saturation of 25 as it was 24 5 months ago    Elevated ferritin  -     deferasirox (EXJADE) 500 MG disintegrating tablet; Take 1 tablet (500 mg total) by mouth before breakfast.  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      RTC 3 weeks to reassess labs  Start exjade to reduce ferritin   This could be adding to her exhaustion  Renal function has reverted to normal with hydration  Blood pressure again not regulated  Daughter insists she has not given pt her meds this am because she would not eat  I explained how dangerous this is that this patient could have a stroke any minute period  Thankfully the patient has no symptoms at this moment in time and the patient herself promises that she will eat right after our visit go home and take her medication      Current Outpatient Prescriptions:     aspirin (ECOTRIN) 81 MG EC tablet, Take 81 mg by mouth once daily., Disp: , Rfl:     cloNIDine (CATAPRES) 0.1 MG tablet, Take 1 tablet (0.1 mg total) by mouth every morning., Disp: 30 tablet, Rfl: 2    clopidogrel (PLAVIX) 75 mg tablet, TAKE ONE TABLET BY MOUTH ONCE DAILY, Disp: 30 tablet, Rfl: 0    cyanocobalamin (VITAMIN B-12) 1000 MCG tablet, Take 100 mcg by mouth once daily., Disp: , Rfl:     deferasirox (EXJADE) 500 MG disintegrating tablet, Take 1 tablet (500 mg total) by mouth before breakfast., Disp: 30 tablet, Rfl: 2    ferrous sulfate 325 mg (65 mg iron) Tab tablet, Take 1 tablet (325 mg total) by mouth once daily., Disp: 90 tablet, Rfl: 0    fexofenadine (ALLEGRA) 180 MG tablet, Take 180 mg by mouth daily as needed., Disp: , Rfl:     furosemide (LASIX) 40 MG tablet, Take 1 tablet (40 mg total) by mouth every morning.,  Disp: 90 tablet, Rfl: 3    hydrOXYzine (ATARAX) 25 MG tablet, Take 25 mg by mouth 2 (two) times daily as needed for Itching., Disp: , Rfl:     KLOR-CON M20 20 mEq tablet, TAKE ONE TABLET BY MOUTH TWICE DAILY, Disp: 180 tablet, Rfl: 0    lisinopril (PRINIVIL,ZESTRIL) 40 MG tablet, TAKE ONE TABLET BY MOUTH ONCE DAILY, Disp: 30 tablet, Rfl: 0    nifedipine (PROCARDIA-XL) 60 MG (OSM) 24 hr tablet, TAKE ONE TABLET BY MOUTH ONCE DAILY, Disp: 90 tablet, Rfl: 3    pravastatin (PRAVACHOL) 40 MG tablet, TAKE ONE TABLET BY MOUTH IN THE EVENING, Disp: 90 tablet, Rfl: 3    vitamin D 1000 units Tab, Take 185 mg by mouth once daily., Disp: , Rfl:     vits A,C,E-lutein-zeax-zn-emanuel 9,650 unit-195 mg-95 unit Cap, Take 1 tablet by mouth 2 (two) times daily with meals., Disp: , Rfl:   No current facility-administered medications for this visit.     Facility-Administered Medications Ordered in Other Visits:     epoetin yamila injection 20,000 Units, 20,000 Units, Subcutaneous, 1 time in Clinic/HOD, Brittnee Shay MD    No further edema while on Lasix which she is tolerating well  Continue antihypertensive medication above  Continue B12 for history of B12 deficiency anemia    RTC 2 weeks with cbc and diff and cmp

## 2017-06-28 DIAGNOSIS — I25.10 CORONARY ARTERY DISEASE, ANGINA PRESENCE UNSPECIFIED, UNSPECIFIED VESSEL OR LESION TYPE, UNSPECIFIED WHETHER NATIVE OR TRANSPLANTED HEART: ICD-10-CM

## 2017-06-28 DIAGNOSIS — I10 ESSENTIAL HYPERTENSION: ICD-10-CM

## 2017-06-29 ENCOUNTER — TELEPHONE (OUTPATIENT)
Dept: PHARMACY | Facility: CLINIC | Age: 82
End: 2017-06-29

## 2017-06-29 NOTE — TELEPHONE ENCOUNTER
[facility] received prescription for Exjade 500mg qam.    Exjade is contraindicated in patients with CrCl<40mL/min    Patient's CREATININE: 0.9 mg/dL (06/27/17 1008)  Estimated creatinine clearance: 27.1 mL/min

## 2017-06-30 ENCOUNTER — TELEPHONE (OUTPATIENT)
Dept: HEMATOLOGY/ONCOLOGY | Facility: CLINIC | Age: 82
End: 2017-06-30

## 2017-06-30 NOTE — TELEPHONE ENCOUNTER
----- Message from Juan Miguel Quiñones sent at 6/30/2017 10:14 AM CDT -----  Contact: [employer]/Chapis Nation called in regarding the attached patient.  Chapis has some questions regarding a prior auth she is working on and would like to talk to nurse.  Prior auth is on Rx for Ex Madelaine.    Chapis's call back number is [phone]

## 2017-07-05 ENCOUNTER — TELEPHONE (OUTPATIENT)
Dept: HEMATOLOGY/ONCOLOGY | Facility: CLINIC | Age: 82
End: 2017-07-05

## 2017-07-05 DIAGNOSIS — I10 ESSENTIAL HYPERTENSION: ICD-10-CM

## 2017-07-05 DIAGNOSIS — I25.10 CORONARY ARTERY DISEASE, ANGINA PRESENCE UNSPECIFIED, UNSPECIFIED VESSEL OR LESION TYPE, UNSPECIFIED WHETHER NATIVE OR TRANSPLANTED HEART: ICD-10-CM

## 2017-07-05 RX ORDER — LISINOPRIL 40 MG/1
TABLET ORAL
Qty: 30 TABLET | Refills: 0 | OUTPATIENT
Start: 2017-07-05

## 2017-07-05 RX ORDER — CLOPIDOGREL BISULFATE 75 MG/1
TABLET ORAL
Qty: 30 TABLET | Refills: 0 | OUTPATIENT
Start: 2017-07-05

## 2017-07-06 RX ORDER — LISINOPRIL 40 MG/1
TABLET ORAL
Qty: 30 TABLET | Refills: 0 | Status: SHIPPED | OUTPATIENT
Start: 2017-07-06 | End: 2017-08-01 | Stop reason: SDUPTHER

## 2017-07-06 RX ORDER — CLOPIDOGREL BISULFATE 75 MG/1
TABLET ORAL
Qty: 30 TABLET | Refills: 0 | Status: SHIPPED | OUTPATIENT
Start: 2017-07-06 | End: 2017-08-01 | Stop reason: SDUPTHER

## 2017-07-17 ENCOUNTER — LAB VISIT (OUTPATIENT)
Dept: LAB | Facility: HOSPITAL | Age: 82
End: 2017-07-17
Attending: INTERNAL MEDICINE
Payer: MEDICARE

## 2017-07-17 DIAGNOSIS — D64.9 ANEMIA, UNSPECIFIED TYPE: ICD-10-CM

## 2017-07-17 LAB
BASOPHILS # BLD AUTO: 0 K/UL
BASOPHILS NFR BLD: 0.8 %
DIFFERENTIAL METHOD: ABNORMAL
EOSINOPHIL # BLD AUTO: 0.2 K/UL
EOSINOPHIL NFR BLD: 3.9 %
ERYTHROCYTE [DISTWIDTH] IN BLOOD BY AUTOMATED COUNT: 14.4 %
FERRITIN SERPL-MCNC: 1427 NG/ML
HCT VFR BLD AUTO: 31.3 %
HGB BLD-MCNC: 10.6 G/DL
IRON SERPL-MCNC: 71 UG/DL
LYMPHOCYTES # BLD AUTO: 2.2 K/UL
LYMPHOCYTES NFR BLD: 35.7 %
MCH RBC QN AUTO: 33.7 PG
MCHC RBC AUTO-ENTMCNC: 34 %
MCV RBC AUTO: 99 FL
MONOCYTES # BLD AUTO: 0.4 K/UL
MONOCYTES NFR BLD: 6.7 %
NEUTROPHILS # BLD AUTO: 3.3 K/UL
NEUTROPHILS NFR BLD: 52.9 %
PLATELET # BLD AUTO: 132 K/UL
PMV BLD AUTO: 11.1 FL
RBC # BLD AUTO: 3.15 M/UL
SATURATED IRON: 28 %
TOTAL IRON BINDING CAPACITY: 258 UG/DL
TRANSFERRIN SERPL-MCNC: 174 MG/DL
WBC # BLD AUTO: 6.3 K/UL

## 2017-07-17 PROCEDURE — 82728 ASSAY OF FERRITIN: CPT

## 2017-07-17 PROCEDURE — 85025 COMPLETE CBC W/AUTO DIFF WBC: CPT

## 2017-07-17 PROCEDURE — 83540 ASSAY OF IRON: CPT

## 2017-07-17 PROCEDURE — 36415 COLL VENOUS BLD VENIPUNCTURE: CPT

## 2017-07-19 DIAGNOSIS — I10 ESSENTIAL HYPERTENSION: ICD-10-CM

## 2017-07-20 RX ORDER — CLONIDINE HYDROCHLORIDE 0.1 MG/1
TABLET ORAL
Qty: 30 TABLET | Refills: 0 | Status: SHIPPED | OUTPATIENT
Start: 2017-07-20 | End: 2017-08-31 | Stop reason: SDUPTHER

## 2017-07-24 ENCOUNTER — OFFICE VISIT (OUTPATIENT)
Dept: HEMATOLOGY/ONCOLOGY | Facility: CLINIC | Age: 82
End: 2017-07-24
Payer: MEDICARE

## 2017-07-24 VITALS
TEMPERATURE: 98 F | DIASTOLIC BLOOD PRESSURE: 91 MMHG | WEIGHT: 94.81 LBS | RESPIRATION RATE: 20 BRPM | HEIGHT: 60 IN | BODY MASS INDEX: 18.62 KG/M2 | HEART RATE: 57 BPM | SYSTOLIC BLOOD PRESSURE: 180 MMHG

## 2017-07-24 DIAGNOSIS — R79.89 ELEVATED FERRITIN: Primary | ICD-10-CM

## 2017-07-24 DIAGNOSIS — I10 ELEVATED BLOOD PRESSURE READING IN OFFICE WITH DIAGNOSIS OF HYPERTENSION: ICD-10-CM

## 2017-07-24 DIAGNOSIS — D69.6 THROMBOCYTOPENIA: ICD-10-CM

## 2017-07-24 DIAGNOSIS — Z86.73 HISTORY OF CARDIOEMBOLIC CEREBROVASCULAR ACCIDENT (CVA): ICD-10-CM

## 2017-07-24 DIAGNOSIS — E53.8 B12 DEFICIENCY: ICD-10-CM

## 2017-07-24 DIAGNOSIS — Z87.448 HISTORY OF RENAL INSUFFICIENCY SYNDROME: ICD-10-CM

## 2017-07-24 DIAGNOSIS — Z86.2 HISTORY OF IRON DEFICIENCY ANEMIA: ICD-10-CM

## 2017-07-24 PROCEDURE — 99214 OFFICE O/P EST MOD 30 MIN: CPT | Mod: S$GLB,,, | Performed by: INTERNAL MEDICINE

## 2017-07-24 PROCEDURE — 1159F MED LIST DOCD IN RCRD: CPT | Mod: S$GLB,,, | Performed by: INTERNAL MEDICINE

## 2017-07-24 PROCEDURE — 99499 UNLISTED E&M SERVICE: CPT | Mod: S$GLB,,, | Performed by: INTERNAL MEDICINE

## 2017-07-24 PROCEDURE — 99999 PR PBB SHADOW E&M-EST. PATIENT-LVL IV: CPT | Mod: PBBFAC,,, | Performed by: INTERNAL MEDICINE

## 2017-07-24 PROCEDURE — 1126F AMNT PAIN NOTED NONE PRSNT: CPT | Mod: S$GLB,,, | Performed by: INTERNAL MEDICINE

## 2017-07-24 NOTE — PROGRESS NOTES
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    Pt here for hyperferritinemia  Cannot use exjade because calculated GFR is too low    SHe does have a history of stroke and carotid artery stenosis.   Daughter states she is sleeping more than 50 % of the day, not eating  She is tolerating meds lisinopril and Procardia for HTN     Patient reports that she is not having any problems with dizziness, headaches or visual changes.    Current Outpatient Prescriptions:     aspirin (ECOTRIN) 81 MG EC tablet, Take 81 mg by mouth once daily., Disp: , Rfl:     cloNIDine (CATAPRES) 0.1 MG tablet, TAKE ONE TABLET BY MOUTH IN THE MORNING, Disp: 30 tablet, Rfl: 0    clopidogrel (PLAVIX) 75 mg tablet, TAKE ONE TABLET BY MOUTH ONCE DAILY, Disp: 30 tablet, Rfl: 0    cyanocobalamin (VITAMIN B-12) 1000 MCG tablet, Take 100 mcg by mouth once daily., Disp: , Rfl:     ferrous sulfate 325 mg (65 mg iron) Tab tablet, Take 1 tablet (325 mg total) by mouth once daily., Disp: 90 tablet, Rfl: 0    fexofenadine (ALLEGRA) 180 MG tablet, Take 180 mg by mouth daily as needed., Disp: , Rfl:     furosemide (LASIX) 40 MG tablet, Take 1 tablet (40 mg total) by mouth every morning., Disp: 90 tablet, Rfl: 3    hydrOXYzine (ATARAX) 25 MG tablet, Take 25 mg by mouth 2 (two) times daily as needed for Itching., Disp: , Rfl:     KLOR-CON M20 20 mEq tablet, TAKE ONE TABLET BY MOUTH TWICE DAILY, Disp: 180 tablet, Rfl: 0    lisinopril (PRINIVIL,ZESTRIL) 40 MG tablet, TAKE ONE TABLET BY MOUTH ONCE DAILY, Disp: 30 tablet, Rfl: 0    nifedipine (PROCARDIA-XL) 60 MG (OSM) 24 hr tablet, TAKE ONE TABLET BY MOUTH ONCE DAILY, Disp: 90 tablet, Rfl: 3    pravastatin (PRAVACHOL) 40 MG tablet, TAKE ONE TABLET BY MOUTH IN THE EVENING, Disp: 90 tablet, Rfl: 3    vitamin D 1000 units Tab, Take 185 mg by mouth once daily.,  Disp: , Rfl:     vits A,C,E-lutein-zeax-zn-emanuel 9,650 unit-195 mg-95 unit Cap, Take 1 tablet by mouth 2 (two) times daily with meals., Disp: , Rfl:   No current facility-administered medications for this visit.     Facility-Administered Medications Ordered in Other Visits:     epoetin yamila injection 20,000 Units, 20,000 Units, Subcutaneous, 1 time in Clinic/HOD, Brittnee Shay MD    REVIEW OF SYSTEMS:  GENERAL:  No fever or chills.  Weight stable increasing fatigue  HEENT:  No photophobia, rhinorrhea, sinus congestion, tinnitus, gingival   bleeding, oral ulcers, or sore throat.  RESPIRATORY:  No shortness of breath, cough, or wheezing.  GASTROINTESTINAL:  No abdominal pain, dysphagia, emesis, diarrhea, or   constipation.  No heartburn, abdominal distention, melena, or hematochezia.  GENITOURINARY:  No urinary frequency, hesitancy, dysuria, or hematuria.  MUSCULOSKELETAL:  No neck pain, back pain  NEUROLOGICAL:  No headaches,   Localized paresthesias  PSYCH:  No agitation, change in behavior, anxiety, or depression.      PHYSICAL EXAMINATION:  BP (!) 180/91 (BP Location: Right arm, Patient Position: Sitting, BP Method: Automatic)   Pulse (!) 57   Temp 97.9 °F (36.6 °C) (Oral)   Resp 20   Ht 5' (1.524 m)   Wt 43 kg (94 lb 12.8 oz)   LMP  (LMP Unknown)   BMI 18.51 kg/m²     GENERAL:  Thin body habitus cachectic.  HEENT:  Temporal wasting.  PSYCH:  Pleasant affect today.  LUNGS:  Clear, no use of accessory muscles during respiration.  CARDIAC:  S1, S2. bradycardia  ABDOMEN:  Nondistended.  EXTREMITIES:  Thin, no cyanosis,positive  edema.  SKIN:  Warm, dry. POSITIVE tenting    LABS:      Lab Results   Component Value Date    WBC 6.30 07/17/2017    HGB 10.6 (L) 07/17/2017    HCT 31.3 (L) 07/17/2017    MCV 99 (H) 07/17/2017     (L) 07/17/2017       CMP  Sodium   Date Value Ref Range Status   06/27/2017 142 136 - 145 mmol/L Final     Potassium   Date Value Ref Range Status   06/27/2017 4.1 3.5 - 5.1 mmol/L  Final     Chloride   Date Value Ref Range Status   06/27/2017 113 (H) 95 - 110 mmol/L Final     CO2   Date Value Ref Range Status   06/27/2017 17 (L) 23 - 29 mmol/L Final     Glucose   Date Value Ref Range Status   06/27/2017 123 (H) 70 - 110 mg/dL Final     BUN, Bld   Date Value Ref Range Status   06/27/2017 20 10 - 30 mg/dL Final     Creatinine   Date Value Ref Range Status   06/27/2017 0.9 0.5 - 1.4 mg/dL Final     Calcium   Date Value Ref Range Status   06/27/2017 9.2 8.7 - 10.5 mg/dL Final     Total Protein   Date Value Ref Range Status   06/27/2017 7.4 6.0 - 8.4 g/dL Final     Albumin   Date Value Ref Range Status   06/27/2017 2.9 (L) 3.5 - 5.2 g/dL Final     Total Bilirubin   Date Value Ref Range Status   06/27/2017 0.5 0.1 - 1.0 mg/dL Final     Comment:     For infants and newborns, interpretation of results should be based  on gestational age, weight and in agreement with clinical  observations.  Premature Infant recommended reference ranges:  Up to 24 hours.............<8.0 mg/dL  Up to 48 hours............<12.0 mg/dL  3-5 days..................<15.0 mg/dL  6-29 days.................<15.0 mg/dL       Alkaline Phosphatase   Date Value Ref Range Status   06/27/2017 84 55 - 135 U/L Final     AST   Date Value Ref Range Status   06/27/2017 33 10 - 40 U/L Final     ALT   Date Value Ref Range Status   06/27/2017 20 10 - 44 U/L Final     Anion Gap   Date Value Ref Range Status   06/27/2017 12 8 - 16 mmol/L Final     eGFR if    Date Value Ref Range Status   06/27/2017 >60 >60 mL/min/1.73 m^2 Final     eGFR if non    Date Value Ref Range Status   06/27/2017 56 (A) >60 mL/min/1.73 m^2 Final     Comment:     Calculation used to obtain the estimated glomerular filtration  rate (eGFR) is the CKD-EPI equation. Since race is unknown   in our information system, the eGFR values for   -American and Non--American patients are given   for each creatinine result.      ferritin  level was 20/1/40 and is decreased to 1191  Iron is 60 as it was 5 months ago in saturation of 25 as it was 24 5 months ago  Lab Results   Component Value Date    FERRITIN 1,427 (H) 07/17/2017         Elevated ferritin    B12 deficiency    History of iron deficiency anemia    History of renal insufficiency syndrome    Abnormally thin body habitus    History of cardioembolic cerebrovascular accident (CVA)    Elevated blood pressure reading in office with diagnosis of hypertension    Thrombocytopenia    ferritin rising  RTC 3 weeks to reassess labs  Pt is frail and Crn clearance low   Unsafe to chelate   Too many risk factors  Cont obs alone   NO FURTHER IV IRON  May need to eval for acquired hemochromatosis  Thankfully the patient has no symptoms at this moment in time and the patient herself promises that she will eat right after our visit go home and take her medication    RTC 2 weeks with cbc and diff and cmp

## 2017-07-31 ENCOUNTER — HOSPITAL ENCOUNTER (OUTPATIENT)
Facility: HOSPITAL | Age: 82
Discharge: HOME HEALTH | End: 2017-08-01
Attending: EMERGENCY MEDICINE | Admitting: HOSPITALIST
Payer: MEDICARE

## 2017-07-31 DIAGNOSIS — R55 SYNCOPE, UNSPECIFIED SYNCOPE TYPE: ICD-10-CM

## 2017-07-31 DIAGNOSIS — R55 SYNCOPE: Primary | ICD-10-CM

## 2017-07-31 DIAGNOSIS — R53.1 GENERALIZED WEAKNESS: ICD-10-CM

## 2017-07-31 DIAGNOSIS — E87.6 HYPOPOTASSEMIA: ICD-10-CM

## 2017-07-31 PROBLEM — I10 BENIGN ESSENTIAL HTN: Status: ACTIVE | Noted: 2017-07-31

## 2017-07-31 PROBLEM — K59.00 CONSTIPATION: Status: ACTIVE | Noted: 2017-07-31

## 2017-07-31 PROBLEM — R54 FRAIL ELDERLY: Status: ACTIVE | Noted: 2017-07-31

## 2017-07-31 LAB
ALBUMIN SERPL BCP-MCNC: 3.1 G/DL
ALP SERPL-CCNC: 80 U/L
ALT SERPL W/O P-5'-P-CCNC: 13 U/L
ANION GAP SERPL CALC-SCNC: 10 MMOL/L
AST SERPL-CCNC: 23 U/L
BASOPHILS # BLD AUTO: 0 K/UL
BASOPHILS NFR BLD: 0.5 %
BILIRUB SERPL-MCNC: 0.6 MG/DL
BILIRUB UR QL STRIP: NEGATIVE
BUN SERPL-MCNC: 29 MG/DL
CALCIUM SERPL-MCNC: 9.8 MG/DL
CHLORIDE SERPL-SCNC: 112 MMOL/L
CLARITY UR: CLEAR
CO2 SERPL-SCNC: 19 MMOL/L
COLOR UR: YELLOW
CREAT SERPL-MCNC: 1.2 MG/DL
DIFFERENTIAL METHOD: ABNORMAL
EOSINOPHIL # BLD AUTO: 0.1 K/UL
EOSINOPHIL NFR BLD: 1.5 %
ERYTHROCYTE [DISTWIDTH] IN BLOOD BY AUTOMATED COUNT: 14 %
EST. GFR  (AFRICAN AMERICAN): 45 ML/MIN/1.73 M^2
EST. GFR  (NON AFRICAN AMERICAN): 39 ML/MIN/1.73 M^2
GLUCOSE SERPL-MCNC: 137 MG/DL
GLUCOSE UR QL STRIP: NEGATIVE
HCT VFR BLD AUTO: 34.1 %
HGB BLD-MCNC: 11.6 G/DL
HGB UR QL STRIP: NEGATIVE
INR PPP: 1
KETONES UR QL STRIP: NEGATIVE
LACTATE SERPL-SCNC: 1.3 MMOL/L
LEUKOCYTE ESTERASE UR QL STRIP: NEGATIVE
LYMPHOCYTES # BLD AUTO: 1.3 K/UL
LYMPHOCYTES NFR BLD: 28.2 %
MAGNESIUM SERPL-MCNC: 2.1 MG/DL
MCH RBC QN AUTO: 33.6 PG
MCHC RBC AUTO-ENTMCNC: 34.1 G/DL
MCV RBC AUTO: 99 FL
MONOCYTES # BLD AUTO: 0.4 K/UL
MONOCYTES NFR BLD: 8.3 %
NEUTROPHILS # BLD AUTO: 2.9 K/UL
NEUTROPHILS NFR BLD: 61.5 %
NITRITE UR QL STRIP: NEGATIVE
PH UR STRIP: 6 [PH] (ref 5–8)
PLATELET # BLD AUTO: 136 K/UL
PMV BLD AUTO: 10.4 FL
POCT GLUCOSE: 187 MG/DL (ref 70–110)
POCT GLUCOSE: 200 MG/DL (ref 70–110)
POTASSIUM SERPL-SCNC: 4.4 MMOL/L
PROT SERPL-MCNC: 7.6 G/DL
PROT UR QL STRIP: ABNORMAL
PROTHROMBIN TIME: 10.5 SEC
RBC # BLD AUTO: 3.46 M/UL
SODIUM SERPL-SCNC: 141 MMOL/L
SP GR UR STRIP: 1.01 (ref 1–1.03)
TROPONIN I SERPL DL<=0.01 NG/ML-MCNC: 0.01 NG/ML
TROPONIN I SERPL DL<=0.01 NG/ML-MCNC: 0.01 NG/ML
TSH SERPL DL<=0.005 MIU/L-ACNC: 2.12 UIU/ML
URN SPEC COLLECT METH UR: ABNORMAL
UROBILINOGEN UR STRIP-ACNC: 1 EU/DL
WBC # BLD AUTO: 4.8 K/UL

## 2017-07-31 PROCEDURE — 93010 ELECTROCARDIOGRAM REPORT: CPT | Mod: ,,, | Performed by: INTERNAL MEDICINE

## 2017-07-31 PROCEDURE — G0378 HOSPITAL OBSERVATION PER HR: HCPCS

## 2017-07-31 PROCEDURE — 36415 COLL VENOUS BLD VENIPUNCTURE: CPT

## 2017-07-31 PROCEDURE — 99285 EMERGENCY DEPT VISIT HI MDM: CPT | Mod: 25

## 2017-07-31 PROCEDURE — 84484 ASSAY OF TROPONIN QUANT: CPT

## 2017-07-31 PROCEDURE — 25000003 PHARM REV CODE 250: Performed by: EMERGENCY MEDICINE

## 2017-07-31 PROCEDURE — 96361 HYDRATE IV INFUSION ADD-ON: CPT

## 2017-07-31 PROCEDURE — 80053 COMPREHEN METABOLIC PANEL: CPT

## 2017-07-31 PROCEDURE — 82962 GLUCOSE BLOOD TEST: CPT

## 2017-07-31 PROCEDURE — 81003 URINALYSIS AUTO W/O SCOPE: CPT

## 2017-07-31 PROCEDURE — 85025 COMPLETE CBC W/AUTO DIFF WBC: CPT

## 2017-07-31 PROCEDURE — 96366 THER/PROPH/DIAG IV INF ADDON: CPT

## 2017-07-31 PROCEDURE — 84443 ASSAY THYROID STIM HORMONE: CPT

## 2017-07-31 PROCEDURE — P9612 CATHETERIZE FOR URINE SPEC: HCPCS

## 2017-07-31 PROCEDURE — 85610 PROTHROMBIN TIME: CPT

## 2017-07-31 PROCEDURE — 25000003 PHARM REV CODE 250: Performed by: NURSE PRACTITIONER

## 2017-07-31 PROCEDURE — 87086 URINE CULTURE/COLONY COUNT: CPT

## 2017-07-31 PROCEDURE — 96360 HYDRATION IV INFUSION INIT: CPT

## 2017-07-31 PROCEDURE — 83735 ASSAY OF MAGNESIUM: CPT

## 2017-07-31 PROCEDURE — 93005 ELECTROCARDIOGRAM TRACING: CPT

## 2017-07-31 PROCEDURE — 83605 ASSAY OF LACTIC ACID: CPT

## 2017-07-31 RX ORDER — LANOLIN ALCOHOL/MO/W.PET/CERES
1000 CREAM (GRAM) TOPICAL DAILY
Status: DISCONTINUED | OUTPATIENT
Start: 2017-08-01 | End: 2017-08-01 | Stop reason: HOSPADM

## 2017-07-31 RX ORDER — LISINOPRIL 40 MG/1
40 TABLET ORAL DAILY
Status: DISCONTINUED | OUTPATIENT
Start: 2017-08-01 | End: 2017-08-01 | Stop reason: HOSPADM

## 2017-07-31 RX ORDER — PRAVASTATIN SODIUM 40 MG/1
40 TABLET ORAL NIGHTLY
Status: DISCONTINUED | OUTPATIENT
Start: 2017-07-31 | End: 2017-08-01 | Stop reason: HOSPADM

## 2017-07-31 RX ORDER — FERROUS SULFATE 325(65) MG
325 TABLET, DELAYED RELEASE (ENTERIC COATED) ORAL DAILY
Status: DISCONTINUED | OUTPATIENT
Start: 2017-08-01 | End: 2017-08-01 | Stop reason: HOSPADM

## 2017-07-31 RX ORDER — CHOLECALCIFEROL (VITAMIN D3) 10 MCG
400 TABLET ORAL DAILY
Status: DISCONTINUED | OUTPATIENT
Start: 2017-08-01 | End: 2017-08-01 | Stop reason: HOSPADM

## 2017-07-31 RX ORDER — CLONIDINE HYDROCHLORIDE 0.1 MG/1
0.1 TABLET ORAL EVERY MORNING
Status: DISCONTINUED | OUTPATIENT
Start: 2017-08-01 | End: 2017-08-01 | Stop reason: HOSPADM

## 2017-07-31 RX ORDER — POLYETHYLENE GLYCOL 3350 17 G/17G
17 POWDER, FOR SOLUTION ORAL 2 TIMES DAILY
Status: DISCONTINUED | OUTPATIENT
Start: 2017-07-31 | End: 2017-08-01 | Stop reason: HOSPADM

## 2017-07-31 RX ORDER — ASPIRIN 81 MG/1
81 TABLET ORAL DAILY
Status: DISCONTINUED | OUTPATIENT
Start: 2017-08-01 | End: 2017-08-01 | Stop reason: HOSPADM

## 2017-07-31 RX ORDER — CLOPIDOGREL BISULFATE 75 MG/1
75 TABLET ORAL DAILY
Status: DISCONTINUED | OUTPATIENT
Start: 2017-08-01 | End: 2017-08-01 | Stop reason: HOSPADM

## 2017-07-31 RX ORDER — SODIUM CHLORIDE 9 MG/ML
INJECTION, SOLUTION INTRAVENOUS CONTINUOUS
Status: DISCONTINUED | OUTPATIENT
Start: 2017-07-31 | End: 2017-08-01 | Stop reason: HOSPADM

## 2017-07-31 RX ADMIN — POLYETHYLENE GLYCOL 3350 17 G: 17 POWDER, FOR SOLUTION ORAL at 08:07

## 2017-07-31 RX ADMIN — SODIUM CHLORIDE: 0.9 INJECTION, SOLUTION INTRAVENOUS at 06:07

## 2017-07-31 RX ADMIN — SODIUM CHLORIDE 500 ML: 0.9 INJECTION, SOLUTION INTRAVENOUS at 04:07

## 2017-07-31 RX ADMIN — PRAVASTATIN SODIUM 40 MG: 40 TABLET ORAL at 08:07

## 2017-07-31 NOTE — ED NOTES
Patient and family have been updated on remaining lab results. No needs or questions at this time.

## 2017-07-31 NOTE — ED NOTES
"Presents to the ER with c/o syncopal episode that occurred just prior to arrival when patient was sitting on porch. Family reports that patient closed her eyes and would not respond when they were talking to her. Patient was given juice and became alert. Patient lives with daughter who states that patient has been acting different over the past 2 days, "She has been much slower." Patient has previous history of stroke with left sided weakness; she ambulates at home with a walker. Patient arrived to ED with dry brief intact, she has occasional incontinence. LBM was yesterday. AAO to self. Patient is unable to tell me the year, where she is or how she arrived to ED. Patient has been reoriented. Mucous membranes are pink and moist. Skin is warm, dry and intact. Lungs are clear bilaterally, respirations are regular and unlabored. Denies cough, congestion, rhinorrhea or SOB. BS active x4, no tenderness with palpation, abd is soft and not distended. Denies any appetite or activity change. S1S2, capillary refill is < 2 seconds. Denies dysuria, difficulty urinating, frequency, numbness, tingling or weakness. KRISTIN VSS    "

## 2017-07-31 NOTE — ED PROVIDER NOTES
"Encounter Date: 7/31/2017    SCRIBE #1 NOTE: I, Ayesha Steward, am scribing for, and in the presence of, Dr. Tomlinson.       History     Chief Complaint   Patient presents with    Loss of Consciousness     Family reports syncopal episode that lasted "10 minutes".  Reports weakness and inability to ambulate with walker.         07/31/2017 2:44 PM     Chief Complaint: Syncope      Jaqui Odell is a 89 y.o. female with DM2, HTN, HLD, CKD, and prior strokes who presents to the ED via EMS after a syncopal episode lasting 5-10 minutes PTA. Per family, she was unresponsive while sitting outside on the porch. The family report that she had her eyes closed but wouldn't respond to verbal stimuli but came to after drinking some juice. She has had prior similar symptoms after being taken off her medications and fell. They also state that she has been much slower over the last 2 days and has required assistance to do things that she is capable of doing at baseline. The patient ate meatloaf and green beans last night and grits with apple juice this morning. She has a history of stroke with left hemiparesis at baseline and several mini strokes. The patient took a laxative yesterday with a BM that followed.   The family/patient deny observing history of an MI, pain, constipation, decreased urine output, or any other symptoms at this time. No pertinent SHx noted.      The history is provided by a relative and the patient (sisters).     Review of patient's allergies indicates:   Allergen Reactions    No known drug allergies      Past Medical History:   Diagnosis Date    Callus     Carotid artery occlusion     Diabetes mellitus     Diabetes mellitus, type 2     Hyperlipidemia     Hypertension     Stroke     Syncope and collapse      Past Surgical History:   Procedure Laterality Date    adrenal insufficiency      CARDIAC CATHETERIZATION      CORONARY ANGIOPLASTY      HYSTERECTOMY       History reviewed. No pertinent family " history.  Social History   Substance Use Topics    Smoking status: Never Smoker    Smokeless tobacco: Never Used    Alcohol use No     Review of Systems   Constitutional: Negative for chills and fever.   HENT: Negative for nosebleeds.    Eyes: Negative for visual disturbance.   Respiratory: Negative for cough and shortness of breath.    Cardiovascular: Negative for chest pain and palpitations.   Gastrointestinal: Negative for abdominal pain, diarrhea, nausea and vomiting.   Genitourinary: Negative for decreased urine volume and dysuria.   Musculoskeletal: Negative for back pain and neck pain.   Skin: Negative for rash.   Neurological: Positive for syncope and weakness (generalized). Negative for seizures and headaches.     Physical Exam     Vitals:    07/31/17 1454   BP: (!) 165/72   Pulse: (!) 57   Resp: 16   Temp: (!) 95.9 °F (35.5 °C)     Physical Exam    Nursing note and vitals reviewed.  Constitutional: She appears well-developed and well-nourished. She is not diaphoretic. No distress.   Patient appears weak.    HENT:   Head: Normocephalic and atraumatic.   Mouth/Throat: Mucous membranes are dry.   Eyes: EOM are normal.   Ptosis of the left eye.    Neck: Normal range of motion. Neck supple.   Cardiovascular: Regular rhythm, normal heart sounds and intact distal pulses. Bradycardia present.    No murmur heard.  Pulmonary/Chest: Breath sounds normal. No respiratory distress. She has no wheezes. She has no rhonchi. She has no rales.   Abdominal: Soft. She exhibits no distension. There is no tenderness. There is no rebound and no guarding.   Musculoskeletal: Normal range of motion. She exhibits no edema or tenderness.   Neurological: She is alert and oriented to person, place, and time. She has normal strength. No cranial nerve deficit or sensory deficit.   Skin: Skin is warm. No rash noted.       ED Course   Procedures  Labs Reviewed   CBC W/ AUTO DIFFERENTIAL - Abnormal; Notable for the following:         Result Value    RBC 3.46 (*)     Hemoglobin 11.6 (*)     Hematocrit 34.1 (*)     MCV 99 (*)     MCH 33.6 (*)     Platelets 136 (*)     All other components within normal limits   COMPREHENSIVE METABOLIC PANEL - Abnormal; Notable for the following:     Chloride 112 (*)     CO2 19 (*)     Glucose 137 (*)     Albumin 3.1 (*)     eGFR if  45 (*)     eGFR if non  39 (*)     All other components within normal limits   URINALYSIS - Abnormal; Notable for the following:     Protein, UA Trace (*)     All other components within normal limits   POCT GLUCOSE - Abnormal; Notable for the following:     POCT Glucose 187 (*)     All other components within normal limits   CULTURE, URINE   PROTIME-INR   TSH   TROPONIN I   MAGNESIUM   POCT GLUCOSE MONITORING CONTINUOUS     Imaging Results          CT Head Without Contrast (Final result)  Result time 07/31/17 15:43:15    Final result by Morales Silva Jr., MD (07/31/17 15:43:15)                 Impression:     Mild brain atrophy with deep white matter ischemic changes otherwise negative head CT.      Electronically signed by: Morales Silva MD  Date:     07/31/17  Time:    15:43              Narrative:    Axial images are obtained through the cranium and displayed at brain and bone windows.  Comparison is made with the prior CT of 08/25/2016.  No cranial fracture is identified.    The basal cisterns are clear and symmetric.  There is no mass-effect or midline shift.  There is mild enlargement of the lateral ventricles, cerebral sulci and sylvian fissures consistent with brain atrophy.  Hypodensities in the central white matter of the cerebrum are consistent with deep white matter ischemic changes.      Focal areas of increased or decreased CT density consistent with tumor, edema, CVA or hemorrhage are not seen.  No intracranial hemorrhage or hematoma is noted.                             X-Ray Chest AP Portable (Final result)  Result time 07/31/17  15:28:04    Final result by Angus Vivas MD (07/31/17 15:28:04)                 Impression:      1.  No acute radiographic findings in the chest.        Electronically signed by: Angus Vivas MD  Date:     07/31/17  Time:    15:28              Narrative:    Comparison: 9/25/15    Technique: Single AP portable chest radiograph.    Findings:The cardiac size is normal. There is atherosclerosis in the aortic arch. Cardiac monitor leads overlie the chest. No alveolar consolidation, pleural effusion or pneumothorax is seen. Cardiac monitor leads overlie the chest. There is multilevel thoracic spondylosis. Degenerative changes are also noted in the shoulders bilaterally.                            EKG Readings: (Independently Interpreted)   Initial Reading: No STEMI.   Sinus bradycardia at a rate 52 bpm with normal axis and normal intervals. No obvious ischemic changes.      X-Rays:   Independently Interpreted Readings:   Chest X-Ray: No cardiomegaly, infiltrate, or effusion.    Head CT: Atrophy noted. No evidence of mass or hemorrhage.      Medical Decision Making:   History:   Old Medical Records: I decided to obtain old medical records.  Initial Assessment:   This is an emergent evaluation of an 58 y.o. female.  Differential Diagnosis:   My differential diagnosis is quite broad but includes UTI, bacteremia, ACS, cardiac dysrhythmia, dehydration, MIRELLA, electrolyte derangement, and intracranial hemorrhage.   Clinical Tests:   Lab Tests: Reviewed and Ordered  Radiological Study: Reviewed and Ordered  Medical Tests: Reviewed and Ordered            Scribe Attestation:   Scribe #1: I performed the above scribed service and the documentation accurately describes the services I performed. I attest to the accuracy of the note.    Attending Attestation:           Physician Attestation for Scribe:  Physician Attestation Statement for Scribe #1: I, Dr. Tomlinson, reviewed documentation, as scribed by Ayesha Steward in my  presence, and it is both accurate and complete.         Attending ED Notes:   4:55 PM  Patient's work-up is negative for anything acute. No evidence of UTI. Renal function is mildly worse than previous. She has received IV fluids. Because of her syncope, I will place in observation.     5:01 PM   I discussed with the hospitalist, Dr. Lara; she has agreed to evaluate and admit the patient.           ED Course     Clinical Impression:     1. Syncope, unspecified syncope type    2. Generalized weakness          Disposition:   Disposition: Placed in Observation                        Gary Tomlinson MD  07/31/17 6498

## 2017-07-31 NOTE — ED NOTES
Patient and family have been updated on plan of care and results. No needs or questions at this time.

## 2017-08-01 VITALS
HEART RATE: 66 BPM | BODY MASS INDEX: 18.62 KG/M2 | OXYGEN SATURATION: 100 % | SYSTOLIC BLOOD PRESSURE: 176 MMHG | TEMPERATURE: 99 F | HEIGHT: 60 IN | RESPIRATION RATE: 18 BRPM | DIASTOLIC BLOOD PRESSURE: 61 MMHG | WEIGHT: 94.81 LBS

## 2017-08-01 DIAGNOSIS — I10 ESSENTIAL HYPERTENSION: ICD-10-CM

## 2017-08-01 DIAGNOSIS — I25.10 CORONARY ARTERY DISEASE, ANGINA PRESENCE UNSPECIFIED, UNSPECIFIED VESSEL OR LESION TYPE, UNSPECIFIED WHETHER NATIVE OR TRANSPLANTED HEART: ICD-10-CM

## 2017-08-01 PROBLEM — K59.00 CONSTIPATION: Status: RESOLVED | Noted: 2017-07-31 | Resolved: 2017-08-01

## 2017-08-01 LAB
BACTERIA UR CULT: NO GROWTH
TROPONIN I SERPL DL<=0.01 NG/ML-MCNC: 0.03 NG/ML

## 2017-08-01 PROCEDURE — 36415 COLL VENOUS BLD VENIPUNCTURE: CPT

## 2017-08-01 PROCEDURE — G0378 HOSPITAL OBSERVATION PER HR: HCPCS

## 2017-08-01 PROCEDURE — 96360 HYDRATION IV INFUSION INIT: CPT

## 2017-08-01 PROCEDURE — 84484 ASSAY OF TROPONIN QUANT: CPT

## 2017-08-01 PROCEDURE — 25000003 PHARM REV CODE 250: Performed by: NURSE PRACTITIONER

## 2017-08-01 PROCEDURE — 94761 N-INVAS EAR/PLS OXIMETRY MLT: CPT

## 2017-08-01 RX ORDER — POLYETHYLENE GLYCOL 3350 17 G/17G
17 POWDER, FOR SOLUTION ORAL 2 TIMES DAILY
Qty: 60 EACH | Refills: 0 | Status: SHIPPED | OUTPATIENT
Start: 2017-08-01 | End: 2017-08-31

## 2017-08-01 RX ORDER — NIFEDIPINE 30 MG/1
60 TABLET, EXTENDED RELEASE ORAL DAILY
Status: DISCONTINUED | OUTPATIENT
Start: 2017-08-01 | End: 2017-08-01 | Stop reason: HOSPADM

## 2017-08-01 RX ORDER — POTASSIUM CHLORIDE 20 MEQ/1
20 TABLET, EXTENDED RELEASE ORAL
Refills: 0
Start: 2017-08-04 | End: 2017-10-30 | Stop reason: SDUPTHER

## 2017-08-01 RX ORDER — FUROSEMIDE 40 MG/1
40 TABLET ORAL
Start: 2017-08-04 | End: 2018-06-25

## 2017-08-01 RX ADMIN — NIFEDIPINE 60 MG: 30 TABLET, FILM COATED, EXTENDED RELEASE ORAL at 10:08

## 2017-08-01 RX ADMIN — CLONIDINE HYDROCHLORIDE 0.1 MG: 0.1 TABLET ORAL at 06:08

## 2017-08-01 RX ADMIN — LISINOPRIL 40 MG: 40 TABLET ORAL at 08:08

## 2017-08-01 RX ADMIN — ASPIRIN 81 MG: 81 TABLET, COATED ORAL at 08:08

## 2017-08-01 RX ADMIN — CHOLECALCIFEROL TAB 10 MCG (400 UNIT) 400 UNITS: 10 TAB at 08:08

## 2017-08-01 RX ADMIN — FERROUS SULFATE TAB EC 325 MG (65 MG FE EQUIVALENT) 325 MG: 325 (65 FE) TABLET DELAYED RESPONSE at 08:08

## 2017-08-01 RX ADMIN — CYANOCOBALAMIN TAB 1000 MCG 1000 MCG: 1000 TAB at 08:08

## 2017-08-01 RX ADMIN — CLOPIDOGREL BISULFATE 75 MG: 75 TABLET ORAL at 08:08

## 2017-08-01 RX ADMIN — POLYETHYLENE GLYCOL 3350 17 G: 17 POWDER, FOR SOLUTION ORAL at 08:08

## 2017-08-01 NOTE — DISCHARGE INSTRUCTIONS
Thank you for choosing [facility] for your medical care. The primary doctor who is taking care of you at the time of your discharge is Mary Jo Lara MD.     You were admitted to the hospital with Syncope.     Please note your discharge instructions, including diet/activity restrictions, follow-up appointments, and medication changes.  If you have any questions about your medical issues, prescriptions, or any other questions, please feel free to contact the [facility] Medicine Dept at [phone] and we will help.    If you are previously with Home health, outpatient PT/OT or under a therapy program, you are cleared to return to those programs.    Please direct all long term medication refills and follow up to your primary care provider, Ruth Pink MD. Thank you again for letting us take care of your health care needs.    Please note the following discharge instructions per your discharging physician-  Dee Padilla Np

## 2017-08-01 NOTE — ASSESSMENT & PLAN NOTE
Telemetry  Daughter/pt do not want major work up  Continue IVF for dehydration  Fall precautions

## 2017-08-01 NOTE — PLAN OF CARE
Problem: Fall Risk (Adult)  Goal: Absence of Falls  Patient will demonstrate the desired outcomes by discharge/transition of care.   Pt remained free from falls this shift. Pt family at bedside. Family and pt have been advised to call for all needs. Both verbalized understanding.

## 2017-08-01 NOTE — PROGRESS NOTES
MAYA sent patient information to [employer] through [facility] system for authorization and processing.MAYA Ahn      2:50pm Per Renee with [employer], [facility] is home health agency.MAYA Ahn

## 2017-08-01 NOTE — HOSPITAL COURSE
The patient was monitored closely during her stay. She remained in SR on telemetry with no arrythmias noted. Her troponin remained negative. No signs of syncope or presyncope noted. No signs of fever or infection noted. The patient's overall condition remained stable and she was discharged to home with Home health to follow.

## 2017-08-01 NOTE — HPI
This is a 89 y.o. female with DM2, HTN, HLD, CKD, Dementia, and prior strokes who presents to the ED via EMS after a syncopal episode lasting 5-10 minutes PTA. Per family, she was unresponsive while sitting outside on the porch. The family report that she had her eyes closed but wouldn't respond to verbal stimuli but came to after drinking some juice. She has had prior similar symptoms in the past when her blood sugar was low. They also state that she has been much slower over the last 2 days and has required assistance to do things that she is capable of doing at baseline.  She has a history of stroke with left hemiparesis at baseline and several mini strokes. The patient took a laxative yesterday with a BM that followed. The family/patient deny observing history of an MI, pain, constipation, decreased urine output, or any other symptoms at this time.  The history is provided by a relative and the patient (sisters).     I had a long discussion with Daughter, Zina Orta, at bedside. Patient has Hx dementia and wants to go home. In agreement with daughter and patient, due to patient's age and multiple comorbidities it is felt no further workup is to be done. Will monitor patient overnight and give IVF and plan discharge back to home in am if remains stable.

## 2017-08-01 NOTE — H&P
"[facility] Hospital Medicine  History & Physical    Patient Name: Jaqui Odell  MRN: [id number]  Admission Date: 7/31/2017  Attending Physician: Mary Jo Lara MD   Primary Care Provider: Ruth Pink MD         Patient information was obtained from patient and ER records.     Subjective:     Principal Problem:Syncope    Chief Complaint:   Chief Complaint   Patient presents with    Loss of Consciousness     Family reports syncopal episode that lasted "10 minutes".  Reports weakness and inability to ambulate with walker.         HPI: This is a 89 y.o. female with DM2, HTN, HLD, CKD, Dementia, and prior strokes who presents to the ED via EMS after a syncopal episode lasting 5-10 minutes PTA. Per family, she was unresponsive while sitting outside on the porch. The family report that she had her eyes closed but wouldn't respond to verbal stimuli but came to after drinking some juice. She has had prior similar symptoms in the past when her blood sugar was low. They also state that she has been much slower over the last 2 days and has required assistance to do things that she is capable of doing at baseline.  She has a history of stroke with left hemiparesis at baseline and several mini strokes. The patient took a laxative yesterday with a BM that followed. The family/patient deny observing history of an MI, pain, constipation, decreased urine output, or any other symptoms at this time.  The history is provided by a relative and the patient (sisters).     I had a long discussion with Daughter, Zina Orta, at bedside. Patient has Hx dementia and wants to go home. In agreement with daughter and patient, due to patient's age and multiple comorbidities it is felt no further workup is to be done. Will monitor patient overnight and give IVF and plan discharge back to home in am if remains stable.        Past Medical History:   Diagnosis Date    Callus     Carotid artery occlusion     Diabetes " mellitus     Diabetes mellitus, type 2     Hyperlipidemia     Hypertension     Stroke     Syncope and collapse        Past Surgical History:   Procedure Laterality Date    adrenal insufficiency      CARDIAC CATHETERIZATION      CORONARY ANGIOPLASTY      HYSTERECTOMY         Review of patient's allergies indicates:   Allergen Reactions    No known drug allergies        Current Facility-Administered Medications on File Prior to Encounter   Medication    epoetin yamila injection 20,000 Units     Current Outpatient Prescriptions on File Prior to Encounter   Medication Sig    aspirin (ECOTRIN) 81 MG EC tablet Take 81 mg by mouth once daily.    cholecalciferol, vitamin D3, 400 unit Tab Take 400 Units by mouth once daily.     cloNIDine (CATAPRES) 0.1 MG tablet TAKE ONE TABLET BY MOUTH IN THE MORNING    clopidogrel (PLAVIX) 75 mg tablet TAKE ONE TABLET BY MOUTH ONCE DAILY    cyanocobalamin (VITAMIN B-12) 1000 MCG tablet Take 1,000 mcg by mouth once daily.     ferrous sulfate 325 mg (65 mg iron) Tab tablet Take 1 tablet (325 mg total) by mouth once daily.    furosemide (LASIX) 40 MG tablet Take 1 tablet (40 mg total) by mouth every morning.    KLOR-CON M20 20 mEq tablet TAKE ONE TABLET BY MOUTH TWICE DAILY    lisinopril (PRINIVIL,ZESTRIL) 40 MG tablet TAKE ONE TABLET BY MOUTH ONCE DAILY    nifedipine (PROCARDIA-XL) 60 MG (OSM) 24 hr tablet TAKE ONE TABLET BY MOUTH ONCE DAILY    pravastatin (PRAVACHOL) 40 MG tablet TAKE ONE TABLET BY MOUTH IN THE EVENING    [DISCONTINUED] fexofenadine (ALLEGRA) 180 MG tablet Take 180 mg by mouth daily as needed.    [DISCONTINUED] hydrOXYzine (ATARAX) 25 MG tablet Take 25 mg by mouth 2 (two) times daily as needed for Itching.    [DISCONTINUED] vits A,C,E-lutein-zeax-zn-emanuel 9,650 unit-195 mg-95 unit Cap Take 1 tablet by mouth 2 (two) times daily with meals.     Family History     None        Social History Main Topics    Smoking status: Never Smoker    Smokeless  tobacco: Never Used    Alcohol use No    Drug use: No    Sexual activity: No     Review of Systems   Reason unable to perform ROS: ROS per daughter - Zina You.   Constitutional: Positive for activity change and fatigue. Negative for appetite change, chills, diaphoresis and fever.   HENT: Positive for hearing loss. Negative for ear pain and facial swelling.    Eyes: Negative for pain and redness.   Respiratory: Negative for cough, choking, chest tightness, shortness of breath, wheezing and stridor.    Cardiovascular: Negative for chest pain, palpitations and leg swelling.   Gastrointestinal: Positive for constipation. Negative for abdominal distention, abdominal pain, diarrhea, nausea and vomiting.   Endocrine: Negative for polydipsia and polyphagia.   Genitourinary: Negative for difficulty urinating, dysuria, flank pain and hematuria.   Musculoskeletal: Positive for arthralgias and gait problem. Negative for neck pain and neck stiffness.   Skin: Negative for color change.   Allergic/Immunologic: Negative for food allergies.   Neurological: Positive for syncope and weakness. Negative for seizures.   Hematological: Does not bruise/bleed easily.   Psychiatric/Behavioral: Positive for confusion. Negative for agitation and behavioral problems. The patient is not nervous/anxious.      Objective:     Vital Signs (Most Recent):  Temp: 97.5 °F (36.4 °C) (07/31/17 1837)  Pulse: 63 (07/31/17 1837)  Resp: 18 (07/31/17 1837)  BP: (!) 162/72 (07/31/17 1837)  SpO2: 99 % (07/31/17 1837) Vital Signs (24h Range):  Temp:  [95.9 °F (35.5 °C)-97.5 °F (36.4 °C)] 97.5 °F (36.4 °C)  Pulse:  [55-63] 63  Resp:  [16-18] 18  SpO2:  [98 %-100 %] 99 %  BP: (162-175)/(72-73) 162/72     Weight: 43 kg (94 lb 12.8 oz)  Body mass index is 18.51 kg/m².    Physical Exam   Constitutional: She appears well-developed. No distress.   Frail elderly   HENT:   Head: Normocephalic and atraumatic.   Eyes: Conjunctivae are normal. Pupils are equal, round,  and reactive to light. Right eye exhibits no discharge. Left eye exhibits no discharge.   Left eye dysfunction   Neck: Normal range of motion. Neck supple. No JVD present.   Cardiovascular: Normal rate, regular rhythm and intact distal pulses.    Murmur heard.  Pulmonary/Chest: Effort normal and breath sounds normal. No stridor. No respiratory distress. She has no wheezes. She has no rales. She exhibits no tenderness.   Abdominal: Soft. Bowel sounds are normal. She exhibits no distension. There is no tenderness. There is no guarding.   Genitourinary:   Genitourinary Comments: Not examined   Musculoskeletal: She exhibits no edema.   Stiff joints   Neurological: She is alert.   Forgetful  Confused at times  Responds appropriately at times to simple commands   Skin: Skin is warm and dry. Capillary refill takes 2 to 3 seconds. She is not diaphoretic.   Decreased turgor   Psychiatric:   Calm        Significant Labs: Reviewed    Significant Imaging: Reviewed    Assessment/Plan:     Dehydration    Continue ivf          Stage 3 chronic kidney disease    Monitor  Renal dose all medications          Constipation    Add miralax          Frail elderly    Dementia/ Debility-  Fall, skin, and aspiration precautions          Anemia, macrocytic    Continue home medications          Hyperlipidemia associated with type 2 diabetes mellitus    Continue home medications          Hypertension associated with diabetes    Continue home medications          * Syncope    Telemetry  Daughter/pt do not want major work up  Continue IVF for dehydration  Fall precautions            VTE Risk Mitigation         Ordered     Medium Risk of VTE  Once      07/31/17 1836     Place sequential compression device  Until discontinued      07/31/17 1836     Place JOSÉ LUIS hose  Until discontinued      07/31/17 1836        LYDIA Castelan  Department of Hospital Medicine   [facility]    Time spent seeing patient( greater than 1/2 spent  in direct contact) : 76 minutes

## 2017-08-01 NOTE — NURSING
Pt's medication and discharge instructions given and reviewed, understanding verbalized.  PIV and telemetry monitor removed.  Awaiting transportation.

## 2017-08-01 NOTE — PLAN OF CARE
Patient sleeping and with h/o dementia.  Completed assessment with pt's daughter Zina.  Patient lives with her daughter Zina and with a nephew.  Patient was able to ambulate independently with her walker up until a couple of days ago and now, per Zina, patient is requiring more assistance.  Zina denied that patient has HH services however states that an aide comes out every Tuesday to assist with patient; patient has medicaid.  Denies POA; patient has one daughter and 2 sons.  Informed daughter that HH is being ordered; daughter signed disclosure form and is in agreement with [employer] assigning the HH agency.       08/01/17 1025   Discharge Assessment   Assessment Type Discharge Planning Assessment   Confirmed/corrected address and phone number on facesheet? Yes   Assessment information obtained from? Other  (daughter Zina)   Type of Healthcare Directive Received (No POA)   Prior to hospitilization cognitive status: (h/o dementia)   Prior to hospitalization functional status: Assistive Equipment;Needs Assistance   Current cognitive status: Unable to Assess  (sleeping)   Current Functional Status: Assistive Equipment;Needs Assistance   Arrived From home or self-care   Lives With child(alphonso), adult;other relative(s)  (daughter Zina and nephew)   Able to Return to Prior Arrangements yes   Is patient able to care for self after discharge? No   How many people do you have in your home that can help with your care after discharge? 1   Patient's perception of discharge disposition home health   Readmission Within The Last 30 Days no previous admission in last 30 days   Patient currently being followed by outpatient case management? No   Patient currently receives home health services? No   Does the patient currently use HME? Yes   Patient currently receives private duty nursing? No   Patient currently receives any other outside agency services? Yes   Name and contact number of agency or person providing outside  services Medicaid waiver - every Tuesday   Equipment Currently Used at Home walker, rolling;wheelchair;bedside commode;shower chair;glucometer   Do you have any problems affording any of your prescribed medications? No   Is the patient taking medications as prescribed? yes   Do you have any financial concerns preventing you from receiving the healthcare you need? No   Does the patient have transportation to healthcare appointments? Yes   Transportation Available family or friend will provide   On Dialysis? No   Discharge Plan A Home;Home Health   Patient/Family In Agreement With Plan yes

## 2017-08-01 NOTE — PLAN OF CARE
08/01/17 1034   Final Note   Assessment Type Final Discharge Note   Discharge Disposition Home-Health   Discharge planning education complete? Yes   Hospital Follow Up  Appt(s) scheduled? Yes

## 2017-08-01 NOTE — PLAN OF CARE
08/01/17 0751   Patient Assessment/Suction   Level of Consciousness (AVPU) alert   Respiratory Effort Normal;Unlabored   PRE-TX-O2-ETCO2   O2 Device (Oxygen Therapy) room air   SpO2 100 %   Pulse Oximetry Type Intermittent   $ Pulse Oximetry - Multiple Charge Pulse Oximetry - Multiple

## 2017-08-01 NOTE — SUBJECTIVE & OBJECTIVE
Past Medical History:   Diagnosis Date    Callus     Carotid artery occlusion     Diabetes mellitus     Diabetes mellitus, type 2     Hyperlipidemia     Hypertension     Stroke     Syncope and collapse        Past Surgical History:   Procedure Laterality Date    adrenal insufficiency      CARDIAC CATHETERIZATION      CORONARY ANGIOPLASTY      HYSTERECTOMY         Review of patient's allergies indicates:   Allergen Reactions    No known drug allergies        Current Facility-Administered Medications on File Prior to Encounter   Medication    epoetin yamila injection 20,000 Units     Current Outpatient Prescriptions on File Prior to Encounter   Medication Sig    aspirin (ECOTRIN) 81 MG EC tablet Take 81 mg by mouth once daily.    cholecalciferol, vitamin D3, 400 unit Tab Take 400 Units by mouth once daily.     cloNIDine (CATAPRES) 0.1 MG tablet TAKE ONE TABLET BY MOUTH IN THE MORNING    clopidogrel (PLAVIX) 75 mg tablet TAKE ONE TABLET BY MOUTH ONCE DAILY    cyanocobalamin (VITAMIN B-12) 1000 MCG tablet Take 1,000 mcg by mouth once daily.     ferrous sulfate 325 mg (65 mg iron) Tab tablet Take 1 tablet (325 mg total) by mouth once daily.    furosemide (LASIX) 40 MG tablet Take 1 tablet (40 mg total) by mouth every morning.    KLOR-CON M20 20 mEq tablet TAKE ONE TABLET BY MOUTH TWICE DAILY    lisinopril (PRINIVIL,ZESTRIL) 40 MG tablet TAKE ONE TABLET BY MOUTH ONCE DAILY    nifedipine (PROCARDIA-XL) 60 MG (OSM) 24 hr tablet TAKE ONE TABLET BY MOUTH ONCE DAILY    pravastatin (PRAVACHOL) 40 MG tablet TAKE ONE TABLET BY MOUTH IN THE EVENING    [DISCONTINUED] fexofenadine (ALLEGRA) 180 MG tablet Take 180 mg by mouth daily as needed.    [DISCONTINUED] hydrOXYzine (ATARAX) 25 MG tablet Take 25 mg by mouth 2 (two) times daily as needed for Itching.    [DISCONTINUED] vits A,C,E-lutein-zeax-zn-emanuel 9,650 unit-195 mg-95 unit Cap Take 1 tablet by mouth 2 (two) times daily with meals.     Family History      None        Social History Main Topics    Smoking status: Never Smoker    Smokeless tobacco: Never Used    Alcohol use No    Drug use: No    Sexual activity: No     Review of Systems   Reason unable to perform ROS: ROS per daughter - Zinatenisha Orta.   Constitutional: Positive for activity change and fatigue. Negative for appetite change, chills, diaphoresis and fever.   HENT: Positive for hearing loss. Negative for ear pain and facial swelling.    Eyes: Negative for pain and redness.   Respiratory: Negative for cough, choking, chest tightness, shortness of breath, wheezing and stridor.    Cardiovascular: Negative for chest pain, palpitations and leg swelling.   Gastrointestinal: Positive for constipation. Negative for abdominal distention, abdominal pain, diarrhea, nausea and vomiting.   Endocrine: Negative for polydipsia and polyphagia.   Genitourinary: Negative for difficulty urinating, dysuria, flank pain and hematuria.   Musculoskeletal: Positive for arthralgias and gait problem. Negative for neck pain and neck stiffness.   Skin: Negative for color change.   Allergic/Immunologic: Negative for food allergies.   Neurological: Positive for syncope and weakness. Negative for seizures.   Hematological: Does not bruise/bleed easily.   Psychiatric/Behavioral: Positive for confusion. Negative for agitation and behavioral problems. The patient is not nervous/anxious.      Objective:     Vital Signs (Most Recent):  Temp: 97.5 °F (36.4 °C) (07/31/17 1837)  Pulse: 63 (07/31/17 1837)  Resp: 18 (07/31/17 1837)  BP: (!) 162/72 (07/31/17 1837)  SpO2: 99 % (07/31/17 1837) Vital Signs (24h Range):  Temp:  [95.9 °F (35.5 °C)-97.5 °F (36.4 °C)] 97.5 °F (36.4 °C)  Pulse:  [55-63] 63  Resp:  [16-18] 18  SpO2:  [98 %-100 %] 99 %  BP: (162-175)/(72-73) 162/72     Weight: 43 kg (94 lb 12.8 oz)  Body mass index is 18.51 kg/m².    Physical Exam   Constitutional: She appears well-developed. No distress.   Frail elderly   HENT:   Head:  Normocephalic and atraumatic.   Eyes: Conjunctivae are normal. Pupils are equal, round, and reactive to light. Right eye exhibits no discharge. Left eye exhibits no discharge.   Left eye dysfunction   Neck: Normal range of motion. Neck supple. No JVD present.   Cardiovascular: Normal rate, regular rhythm and intact distal pulses.    Murmur heard.  Pulmonary/Chest: Effort normal and breath sounds normal. No stridor. No respiratory distress. She has no wheezes. She has no rales. She exhibits no tenderness.   Abdominal: Soft. Bowel sounds are normal. She exhibits no distension. There is no tenderness. There is no guarding.   Genitourinary:   Genitourinary Comments: Not examined   Musculoskeletal: She exhibits no edema.   Stiff joints   Neurological: She is alert.   Forgetful  Confused at times  Responds appropriately at times to simple commands   Skin: Skin is warm and dry. Capillary refill takes 2 to 3 seconds. She is not diaphoretic.   Decreased turgor   Psychiatric:   Calm        Significant Labs: Reviewed    Significant Imaging: Reviewed

## 2017-08-01 NOTE — DISCHARGE SUMMARY
[facility] Hospital Medicine  Discharge Summary      Patient Name: Jaqui Odell  MRN: [id number]  Admission Date: 7/31/2017  Hospital Length of Stay: 0 days  Discharge Date and Time:  08/01/2017 1:49 PM  Attending Physician: Sana att. providers found   Discharging Provider: LYDIA Castelan  Primary Care Provider: Ruth Pink MD      HPI:   This is a 89 y.o. female with DM2, HTN, HLD, CKD, Dementia, and prior strokes who presents to the ED via EMS after a syncopal episode lasting 5-10 minutes PTA. Per family, she was unresponsive while sitting outside on the porch. The family report that she had her eyes closed but wouldn't respond to verbal stimuli but came to after drinking some juice. She has had prior similar symptoms in the past when her blood sugar was low. They also state that she has been much slower over the last 2 days and has required assistance to do things that she is capable of doing at baseline.  She has a history of stroke with left hemiparesis at baseline and several mini strokes. The patient took a laxative yesterday with a BM that followed. The family/patient deny observing history of an MI, pain, constipation, decreased urine output, or any other symptoms at this time.  The history is provided by a relative and the patient (sisters).     I had a long discussion with Daughter, Zina Orta, at bedside. Patient has Hx dementia and wants to go home. In agreement with daughter and patient, due to patient's age and multiple comorbidities it is felt no further workup is to be done. Will monitor patient overnight and give IVF and plan discharge back to home in am if remains stable.       * No surgery found *      Indwelling Lines/Drains at time of discharge:   Lines/Drains/Airways          No matching active lines, drains, or airways        Hospital Course:   The patient was monitored closely during her stay. A ct scan of head without contrast was done with no acute issues  noted. She remained in SR on telemetry with no arrythmias noted. Her troponin remained negative. No signs of syncope or presyncope noted. No signs of fever or infection noted. The patient's overall condition remained stable and she was discharged to home with Home health to follow.      Consults:   Consults         Status Ordering Provider     Inpatient consult to Social Work/Case Management  Once     Provider:  (Not yet assigned)    TANK Sage          Significant Diagnostic Studies:     CT Head Without Contrast- Mild brain atrophy with deep white matter ischemic changes otherwise negative head CT.    Cxr-No acute radiographic findings in the chest.     Labs:   CMP   Recent Labs  Lab 07/31/17  1551      K 4.4   *   CO2 19*   *   BUN 29   CREATININE 1.2   CALCIUM 9.8   PROT 7.6   ALBUMIN 3.1*   BILITOT 0.6   ALKPHOS 80   AST 23   ALT 13   ANIONGAP 10   ESTGFRAFRICA 45*   EGFRNONAA 39*   , CBC   Recent Labs  Lab 07/31/17  1551   WBC 4.80   HGB 11.6*   HCT 34.1*   *    and Troponin   Recent Labs  Lab 08/01/17  0005   TROPONINI 0.025       Pending Diagnostic Studies:     None        Final Active Diagnoses:    Diagnosis Date Noted POA    Stage 3 chronic kidney disease [N18.3] 09/25/2015 Yes    Frail elderly [R54] 07/31/2017 Yes    Anemia, macrocytic [D53.9] 12/19/2016 Yes    Hyperlipidemia associated with type 2 diabetes mellitus [E11.69, E78.5] 12/12/2016 Yes    Hypertension associated with diabetes [E11.59, I10] 12/12/2016 Yes    Debility [R53.81] 09/25/2015 Yes    Dementia without behavioral disturbance [F03.90] 02/19/2014 Yes      Problems Resolved During this Admission:    Diagnosis Date Noted Date Resolved POA    PRINCIPAL PROBLEM:  Syncope [R55] 09/25/2015 08/01/2017 Yes    Dehydration [E86.0] 09/25/2015 08/01/2017 Yes    Constipation [K59.00] 07/31/2017 08/01/2017 Yes      No new Assessment & Plan notes have been filed under this hospital service since the  last note was generated.  Service: Hospital Medicine      Discharged Condition: stable    Disposition: Home-Health Care [facility]    Follow Up:  Follow-up Information     Ruth Pink MD In 2 weeks.    Specialty:  Family Medicine  Why:  Take blood pressure and pulse 2 x day and keep log for follow up with PCP  Contact information:  [place] [id number]  [phone]                 Patient Instructions:     Referral to Home health   Referral Priority: Routine Referral Type: Home Health   Referral Reason: Specialty Services Required    Requested Specialty: Home Health Services    Number of Visits Requested: 1      Diet general   Order Comments: 1800 ada cardiac diet  Glucerna 1 can po tid     Activity as tolerated   Order Comments: Fall, skin, and aspiration precautions     Call MD for:  temperature >100.4     Call MD for:   Order Comments: Any decline in condition       Medications:  Reconciled Home Medications:   Discharge Medication List as of 8/1/2017 10:54 AM      START taking these medications    Details   polyethylene glycol (GLYCOLAX) 17 gram PwPk Take 17 g by mouth 2 (two) times daily., Starting Tue 8/1/2017, Until Thu 8/31/2017, Print         CONTINUE these medications which have CHANGED    Details   furosemide (LASIX) 40 MG tablet Take 1 tablet (40 mg total) by mouth every Mon, Wed, Fri., Starting Fri 8/4/2017, No Print      potassium chloride SA (KLOR-CON M20) 20 MEQ tablet Take 1 tablet (20 mEq total) by mouth every Mon, Wed, Fri., Starting Fri 8/4/2017, No Print         CONTINUE these medications which have NOT CHANGED    Details   aspirin (ECOTRIN) 81 MG EC tablet Take 81 mg by mouth once daily., Until Discontinued, Historical Med      cholecalciferol, vitamin D3, 400 unit Tab Take 400 Units by mouth once daily. , Historical Med      cloNIDine (CATAPRES) 0.1 MG tablet TAKE ONE TABLET BY MOUTH IN THE MORNING, Normal      clopidogrel (PLAVIX) 75 mg tablet TAKE ONE TABLET BY MOUTH ONCE DAILY,  Normal      cyanocobalamin (VITAMIN B-12) 1000 MCG tablet Take 1,000 mcg by mouth once daily. , Historical Med      ferrous sulfate 325 mg (65 mg iron) Tab tablet Take 1 tablet (325 mg total) by mouth once daily., Starting 3/17/2017, Until Discontinued, Normal      lisinopril (PRINIVIL,ZESTRIL) 40 MG tablet TAKE ONE TABLET BY MOUTH ONCE DAILY, Normal      nifedipine (PROCARDIA-XL) 60 MG (OSM) 24 hr tablet TAKE ONE TABLET BY MOUTH ONCE DAILY, Normal      pravastatin (PRAVACHOL) 40 MG tablet TAKE ONE TABLET BY MOUTH IN THE EVENING, Normal           Time spent on the discharge of patient: 48 minutes    Dee Padilla, LYDIA  Department of Hospital Medicine  [facility]

## 2017-08-02 ENCOUNTER — TELEPHONE (OUTPATIENT)
Dept: FAMILY MEDICINE | Facility: CLINIC | Age: 82
End: 2017-08-02

## 2017-08-02 ENCOUNTER — TELEPHONE (OUTPATIENT)
Dept: MEDSURG UNIT | Facility: HOSPITAL | Age: 82
End: 2017-08-02

## 2017-08-02 RX ORDER — CLOPIDOGREL BISULFATE 75 MG/1
TABLET ORAL
Qty: 30 TABLET | Refills: 0 | Status: SHIPPED | OUTPATIENT
Start: 2017-08-02 | End: 2017-08-31 | Stop reason: SDUPTHER

## 2017-08-02 RX ORDER — LISINOPRIL 40 MG/1
TABLET ORAL
Qty: 30 TABLET | Refills: 0 | Status: SHIPPED | OUTPATIENT
Start: 2017-08-02 | End: 2017-08-31 | Stop reason: SDUPTHER

## 2017-08-02 NOTE — TELEPHONE ENCOUNTER
----- Message from Yemi Whittington sent at 8/2/2017 12:21 PM CDT -----  Contact: [facility]  Caller states pt family is request hospice consult and the family wants to use [facility], please contact caller at [phone]

## 2017-08-03 DIAGNOSIS — D64.9 ANEMIA, UNSPECIFIED TYPE: ICD-10-CM

## 2017-08-03 DIAGNOSIS — D46.9 MDS (MYELODYSPLASTIC SYNDROME): Primary | ICD-10-CM

## 2017-08-09 ENCOUNTER — OFFICE VISIT (OUTPATIENT)
Dept: HEMATOLOGY/ONCOLOGY | Facility: CLINIC | Age: 82
End: 2017-08-09
Payer: MEDICARE

## 2017-08-09 ENCOUNTER — DOCUMENTATION ONLY (OUTPATIENT)
Dept: FAMILY MEDICINE | Facility: CLINIC | Age: 82
End: 2017-08-09

## 2017-08-09 VITALS
WEIGHT: 94.81 LBS | TEMPERATURE: 98 F | HEART RATE: 54 BPM | DIASTOLIC BLOOD PRESSURE: 61 MMHG | RESPIRATION RATE: 18 BRPM | BODY MASS INDEX: 18.62 KG/M2 | HEIGHT: 60 IN | SYSTOLIC BLOOD PRESSURE: 123 MMHG

## 2017-08-09 DIAGNOSIS — D64.9 ANEMIA, UNSPECIFIED TYPE: Primary | ICD-10-CM

## 2017-08-09 DIAGNOSIS — Z86.39 HISTORY OF IRON DEFICIENCY: ICD-10-CM

## 2017-08-09 PROCEDURE — 1126F AMNT PAIN NOTED NONE PRSNT: CPT | Mod: S$GLB,,, | Performed by: INTERNAL MEDICINE

## 2017-08-09 PROCEDURE — 99213 OFFICE O/P EST LOW 20 MIN: CPT | Mod: S$GLB,,, | Performed by: INTERNAL MEDICINE

## 2017-08-09 PROCEDURE — 99999 PR PBB SHADOW E&M-EST. PATIENT-LVL III: CPT | Mod: PBBFAC,,, | Performed by: INTERNAL MEDICINE

## 2017-08-09 PROCEDURE — 1159F MED LIST DOCD IN RCRD: CPT | Mod: S$GLB,,, | Performed by: INTERNAL MEDICINE

## 2017-08-09 PROCEDURE — 99499 UNLISTED E&M SERVICE: CPT | Mod: S$GLB,,, | Performed by: INTERNAL MEDICINE

## 2017-08-09 NOTE — PROGRESS NOTES
Pre-Visit Chart Review  For Appointment Scheduled on 8/10/17    Health Maintenance Due   Topic Date Due    Foot Exam  06/23/2017    Influenza Vaccine  08/01/2017

## 2017-08-09 NOTE — PROGRESS NOTES
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    Pt here for hyperferritinemia  Cannot use exjade because calculated GFR is too low    SHe does have a history of stroke and carotid artery stenosis.   Daughter states she is sleeping more than 50 % of the day, not eating  She is tolerating meds lisinopril and Procardia for HTN     Patient reports that she is not having any problems with dizziness, headaches or visual changes.    Current Outpatient Prescriptions:     aspirin (ECOTRIN) 81 MG EC tablet, Take 81 mg by mouth once daily., Disp: , Rfl:     cholecalciferol, vitamin D3, 400 unit Tab, Take 400 Units by mouth once daily. , Disp: , Rfl:     cloNIDine (CATAPRES) 0.1 MG tablet, TAKE ONE TABLET BY MOUTH IN THE MORNING, Disp: 30 tablet, Rfl: 0    clopidogrel (PLAVIX) 75 mg tablet, TAKE ONE TABLET BY MOUTH ONCE DAILY, Disp: 30 tablet, Rfl: 0    cyanocobalamin (VITAMIN B-12) 1000 MCG tablet, Take 1,000 mcg by mouth once daily. , Disp: , Rfl:     ferrous sulfate 325 mg (65 mg iron) Tab tablet, Take 1 tablet (325 mg total) by mouth once daily., Disp: 90 tablet, Rfl: 0    furosemide (LASIX) 40 MG tablet, Take 1 tablet (40 mg total) by mouth every Mon, Wed, Fri., Disp: , Rfl:     lisinopril (PRINIVIL,ZESTRIL) 40 MG tablet, TAKE ONE TABLET BY MOUTH ONCE DAILY, Disp: 30 tablet, Rfl: 0    nifedipine (PROCARDIA-XL) 60 MG (OSM) 24 hr tablet, TAKE ONE TABLET BY MOUTH ONCE DAILY, Disp: 90 tablet, Rfl: 3    polyethylene glycol (GLYCOLAX) 17 gram PwPk, Take 17 g by mouth 2 (two) times daily., Disp: 60 each, Rfl: 0    potassium chloride SA (KLOR-CON M20) 20 MEQ tablet, Take 1 tablet (20 mEq total) by mouth every Mon, Wed, Fri., Disp: , Rfl: 0    pravastatin (PRAVACHOL) 40 MG tablet, TAKE ONE TABLET BY MOUTH IN THE EVENING, Disp: 90 tablet, Rfl: 3  No current  facility-administered medications for this visit.     Facility-Administered Medications Ordered in Other Visits:     epoetin yamila injection 20,000 Units, 20,000 Units, Subcutaneous, 1 time in Clinic/HOD, Brittnee Shay MD    REVIEW OF SYSTEMS:  GENERAL:  No fever or chills.  Weight stable increasing fatigue  HEENT:  No photophobia, rhinorrhea, sinus congestion, tinnitus, gingival   bleeding, oral ulcers, or sore throat.  RESPIRATORY:  No shortness of breath, cough, or wheezing.  GASTROINTESTINAL:  No abdominal pain, dysphagia, emesis, diarrhea, or   constipation.  No heartburn, abdominal distention, melena, or hematochezia.  GENITOURINARY:  No urinary frequency, hesitancy, dysuria, or hematuria.  MUSCULOSKELETAL:  No neck pain, back pain  NEUROLOGICAL:  No headaches,   Localized paresthesias  PSYCH:  No agitation, change in behavior, anxiety, or depression.      PHYSICAL EXAMINATION:  /61 (BP Location: Right arm, Patient Position: Sitting, BP Method: Automatic)   Pulse (!) 54   Temp 98 °F (36.7 °C) (Oral)   Resp 18   Ht 5' (1.524 m)   Wt 43 kg (94 lb 12.8 oz)   LMP  (LMP Unknown)   BMI 18.51 kg/m²     GENERAL:  Thin body habitus cachectic.  HEENT:  Temporal wasting.  PSYCH:  Pleasant affect today.  LUNGS:  Clear, no use of accessory muscles during respiration.  CARDIAC:  S1, S2. bradycardia  ABDOMEN:  Nondistended.  EXTREMITIES:  Thin, no cyanosis,positive  edema.  SKIN:  Warm, dry. POSITIVE tenting    LABS:      Lab Results   Component Value Date    WBC 4.80 07/31/2017    HGB 11.6 (L) 07/31/2017    HCT 34.1 (L) 07/31/2017    MCV 99 (H) 07/31/2017     (L) 07/31/2017       CMP  Sodium   Date Value Ref Range Status   07/31/2017 141 136 - 145 mmol/L Final     Potassium   Date Value Ref Range Status   07/31/2017 4.4 3.5 - 5.1 mmol/L Final     Chloride   Date Value Ref Range Status   07/31/2017 112 (H) 95 - 110 mmol/L Final     CO2   Date Value Ref Range Status   07/31/2017 19 (L) 23 - 29 mmol/L  Final     Glucose   Date Value Ref Range Status   07/31/2017 137 (H) 70 - 110 mg/dL Final     BUN, Bld   Date Value Ref Range Status   07/31/2017 29 10 - 30 mg/dL Final     Creatinine   Date Value Ref Range Status   07/31/2017 1.2 0.5 - 1.4 mg/dL Final     Calcium   Date Value Ref Range Status   07/31/2017 9.8 8.7 - 10.5 mg/dL Final     Total Protein   Date Value Ref Range Status   07/31/2017 7.6 6.0 - 8.4 g/dL Final     Albumin   Date Value Ref Range Status   07/31/2017 3.1 (L) 3.5 - 5.2 g/dL Final     Total Bilirubin   Date Value Ref Range Status   07/31/2017 0.6 0.1 - 1.0 mg/dL Final     Comment:     For infants and newborns, interpretation of results should be based  on gestational age, weight and in agreement with clinical  observations.  Premature Infant recommended reference ranges:  Up to 24 hours.............<8.0 mg/dL  Up to 48 hours............<12.0 mg/dL  3-5 days..................<15.0 mg/dL  6-29 days.................<15.0 mg/dL       Alkaline Phosphatase   Date Value Ref Range Status   07/31/2017 80 55 - 135 U/L Final     AST   Date Value Ref Range Status   07/31/2017 23 10 - 40 U/L Final     ALT   Date Value Ref Range Status   07/31/2017 13 10 - 44 U/L Final     Anion Gap   Date Value Ref Range Status   07/31/2017 10 8 - 16 mmol/L Final     eGFR if    Date Value Ref Range Status   07/31/2017 45 (A) >60 mL/min/1.73 m^2 Final     eGFR if non    Date Value Ref Range Status   07/31/2017 39 (A) >60 mL/min/1.73 m^2 Final     Comment:     Calculation used to obtain the estimated glomerular filtration  rate (eGFR) is the CKD-EPI equation. Since race is unknown   in our information system, the eGFR values for   -American and Non--American patients are given   for each creatinine result.      ferritin level was 20/1/40 and is decreased to 1191  Iron is 60 as it was 5 months ago in saturation of 25 as it was 24 5 months ago  Lab Results   Component Value Date     FERRITIN 1,427 (H) 07/17/2017         Anemia, unspecified type  -     CBC auto differential; Future; Expected date: 08/09/2017  -     Ferritin; Future; Expected date: 08/09/2017  -     Iron and TIBC; Future; Expected date: 08/09/2017  -     FREE LT CHAIN ANAL; Future; Expected date: 08/09/2017    History of iron deficiency  -     CBC auto differential; Future; Expected date: 08/09/2017  -     Ferritin; Future; Expected date: 08/09/2017  -     Iron and TIBC; Future; Expected date: 08/09/2017  -     FREE LT CHAIN ANAL; Future; Expected date: 08/09/2017    ferritin rising  Blood pressure great!!  Recheck cbc and reassess    Unsafe to chelate due to renal insuffuciency  Too many risk factors  Cont obs alone   NO FURTHER IV IRON at the moment but must follow counts  May need to eval for acquired hemochromatosis  Thankfully the patient has no symptoms at this moment in time and the patient herself promises that she will eat right after our visit go home and take her medication    RTC 2 weeks with cbc and diff and cmp

## 2017-08-10 ENCOUNTER — OFFICE VISIT (OUTPATIENT)
Dept: FAMILY MEDICINE | Facility: CLINIC | Age: 82
End: 2017-08-10
Payer: MEDICARE

## 2017-08-10 VITALS
HEART RATE: 48 BPM | HEIGHT: 60 IN | BODY MASS INDEX: 18.51 KG/M2 | DIASTOLIC BLOOD PRESSURE: 50 MMHG | TEMPERATURE: 98 F | SYSTOLIC BLOOD PRESSURE: 120 MMHG

## 2017-08-10 DIAGNOSIS — E11.21 TYPE 2 DIABETES MELLITUS WITH DIABETIC NEPHROPATHY, WITHOUT LONG-TERM CURRENT USE OF INSULIN: ICD-10-CM

## 2017-08-10 DIAGNOSIS — Z09 HOSPITAL DISCHARGE FOLLOW-UP: Primary | ICD-10-CM

## 2017-08-10 DIAGNOSIS — G31.09 OTHER FRONTOTEMPORAL DEMENTIA WITHOUT BEHAVIORAL DISTURBANCE: ICD-10-CM

## 2017-08-10 DIAGNOSIS — R55 SYNCOPE, UNSPECIFIED SYNCOPE TYPE: ICD-10-CM

## 2017-08-10 DIAGNOSIS — F02.80 OTHER FRONTOTEMPORAL DEMENTIA WITHOUT BEHAVIORAL DISTURBANCE: ICD-10-CM

## 2017-08-10 DIAGNOSIS — E11.59 HYPERTENSION ASSOCIATED WITH DIABETES: ICD-10-CM

## 2017-08-10 DIAGNOSIS — I15.2 HYPERTENSION ASSOCIATED WITH DIABETES: ICD-10-CM

## 2017-08-10 PROCEDURE — 1159F MED LIST DOCD IN RCRD: CPT | Mod: S$GLB,,, | Performed by: NURSE PRACTITIONER

## 2017-08-10 PROCEDURE — 99214 OFFICE O/P EST MOD 30 MIN: CPT | Mod: S$GLB,,, | Performed by: NURSE PRACTITIONER

## 2017-08-10 PROCEDURE — 1126F AMNT PAIN NOTED NONE PRSNT: CPT | Mod: S$GLB,,, | Performed by: NURSE PRACTITIONER

## 2017-08-10 PROCEDURE — 3008F BODY MASS INDEX DOCD: CPT | Mod: S$GLB,,, | Performed by: NURSE PRACTITIONER

## 2017-08-10 PROCEDURE — 99999 PR PBB SHADOW E&M-EST. PATIENT-LVL III: CPT | Mod: PBBFAC,,, | Performed by: NURSE PRACTITIONER

## 2017-08-10 PROCEDURE — 99499 UNLISTED E&M SERVICE: CPT | Mod: S$GLB,,, | Performed by: NURSE PRACTITIONER

## 2017-08-10 NOTE — PROGRESS NOTES
Subjective:       Patient ID: Jaqui Odell is a 89 y.o. female.    Chief Complaint: Hospital Follow Up (syncope)    Ms. Odell presents to the clinic today for hospital follow up for syncopal episode.  She was hospitalized from 7/31-8/1.  She improved with IV fluids and was discharged with a decreased dosage of Lasix.  She had CT head which did not show acute problem.  She has been doing well with home health and PT but is still weaker than her baseline.  She has been sleeping a lot and her daughter states she was told the patient would sleep a lot due to the multiple myeloma (per dr. Shay's note this is multiple myeloma vs. Myelodysplastic syndrome).  Patient has dementia and she is able to state her location and name but not the year or season.  She recognizes her daughter who is present today.  Her daughter states patient was evaluated by [facility] and didn't qualify for hospice.      Review of Systems   Constitutional: Positive for fatigue. Negative for appetite change, chills and fever.   Eyes: Negative for visual disturbance.   Respiratory: Negative for cough, shortness of breath and wheezing.    Cardiovascular: Negative for chest pain, palpitations and leg swelling.   Gastrointestinal: Negative for abdominal pain, constipation and diarrhea.   Neurological: Positive for weakness. Negative for dizziness and light-headedness.       Objective:      Physical Exam   Constitutional: She appears well-nourished. No distress.   HENT:   Head: Normocephalic and atraumatic.   Right Ear: External ear normal.   Left Ear: External ear normal.   Mouth/Throat: Oropharynx is clear and moist. No oropharyngeal exudate.   Eyes: Pupils are equal, round, and reactive to light. Right eye exhibits no discharge. Left eye exhibits no discharge.   Neck: Neck supple. No thyromegaly present.   Cardiovascular: Normal rate and regular rhythm.  Exam reveals no gallop and no friction rub.    No murmur heard.  Pulses:       Dorsalis  pedis pulses are 1+ on the right side, and 1+ on the left side.        Posterior tibial pulses are 1+ on the right side, and 1+ on the left side.   Bilateral trace pedal edema   Pulmonary/Chest: Effort normal and breath sounds normal. No respiratory distress. She has no wheezes. She has no rales.   Abdominal: Soft. She exhibits no distension. There is no tenderness.   Musculoskeletal:        Right foot: There is deformity (pes planus). There is normal range of motion.        Left foot: There is deformity (pes planus). There is normal range of motion.   Feet:   Right Foot:   Skin Integrity: Negative for ulcer, blister or skin breakdown.   Left Foot:   Skin Integrity: Negative for ulcer, blister or skin breakdown.   Lymphadenopathy:     She has no cervical adenopathy.   Neurological: She is disoriented (does not know season/year). Coordination normal.   Patient falls asleep easily during visit, easily arousable to voice   Skin: Skin is warm and dry.   Psychiatric: She has a normal mood and affect. Her behavior is normal. Thought content normal.   Vitals reviewed.          Current Outpatient Prescriptions:     aspirin (ECOTRIN) 81 MG EC tablet, Take 81 mg by mouth once daily., Disp: , Rfl:     cholecalciferol, vitamin D3, 400 unit Tab, Take 400 Units by mouth once daily. , Disp: , Rfl:     cloNIDine (CATAPRES) 0.1 MG tablet, TAKE ONE TABLET BY MOUTH IN THE MORNING, Disp: 30 tablet, Rfl: 0    clopidogrel (PLAVIX) 75 mg tablet, TAKE ONE TABLET BY MOUTH ONCE DAILY, Disp: 30 tablet, Rfl: 0    cyanocobalamin (VITAMIN B-12) 1000 MCG tablet, Take 1,000 mcg by mouth once daily. , Disp: , Rfl:     ferrous sulfate 325 mg (65 mg iron) Tab tablet, Take 1 tablet (325 mg total) by mouth once daily., Disp: 90 tablet, Rfl: 0    furosemide (LASIX) 40 MG tablet, Take 1 tablet (40 mg total) by mouth every Mon, Wed, Fri., Disp: , Rfl:     lisinopril (PRINIVIL,ZESTRIL) 40 MG tablet, TAKE ONE TABLET BY MOUTH ONCE DAILY, Disp: 30  tablet, Rfl: 0    nifedipine (PROCARDIA-XL) 60 MG (OSM) 24 hr tablet, TAKE ONE TABLET BY MOUTH ONCE DAILY, Disp: 90 tablet, Rfl: 3    polyethylene glycol (GLYCOLAX) 17 gram PwPk, Take 17 g by mouth 2 (two) times daily., Disp: 60 each, Rfl: 0    potassium chloride SA (KLOR-CON M20) 20 MEQ tablet, Take 1 tablet (20 mEq total) by mouth every Mon, Wed, Fri., Disp: , Rfl: 0    pravastatin (PRAVACHOL) 40 MG tablet, TAKE ONE TABLET BY MOUTH IN THE EVENING, Disp: 90 tablet, Rfl: 3  No current facility-administered medications for this visit.     Facility-Administered Medications Ordered in Other Visits:     epoetin yamila injection 20,000 Units, 20,000 Units, Subcutaneous, 1 time in Clinic/HOD, Brittnee Shay MD  Assessment:       1. Hospital discharge follow-up    2. Syncope, unspecified syncope type    3. Type 2 diabetes mellitus with diabetic nephropathy, without long-term current use of insulin    4. Hypertension associated with diabetes    5. 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    Syncope, unspecified syncope type  No further syncopal episodes, likely due to dehydration.  Lasix was decreased.    Type 2 diabetes mellitus with diabetic nephropathy, without long-term current use of insulin  Stable, continue current treatment.    Hypertension associated with diabetes  Stable, continue current treatment.    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    Patient readiness: acceptance and barriers:none    During the course of the visit the patient was educated and counseled about the following:     Hypertension:   Medication: no change.    Goals: Hypertension: Reduce Blood Pressure    Did patient meet goals/outcomes: Yes    The following self management tools provided: declined    Patient Instructions (the written plan) was given to the patient/family.     Time spent with patient: 30 minutes

## 2017-08-14 ENCOUNTER — TELEPHONE (OUTPATIENT)
Dept: FAMILY MEDICINE | Facility: CLINIC | Age: 82
End: 2017-08-14

## 2017-08-14 ENCOUNTER — TELEPHONE (OUTPATIENT)
Dept: HEMATOLOGY/ONCOLOGY | Facility: CLINIC | Age: 82
End: 2017-08-14

## 2017-08-14 NOTE — TELEPHONE ENCOUNTER
----- Message from Sabino Harvey sent at 8/14/2017  2:11 PM CDT -----  Contact: Deirdre Gilmore with [facility] called regarding request for records?please call back at [phone].fax#[phone]. Thanks,

## 2017-08-14 NOTE — TELEPHONE ENCOUNTER
----- Message from Karla Salcedo sent at 8/14/2017  2:17 PM CDT -----  [facility]/Deirdre [phone] is calling because she had sent in a request for records and she wanted to receive it. Please call to verify or fax back at [phone].

## 2017-08-31 DIAGNOSIS — I25.10 CORONARY ARTERY DISEASE, ANGINA PRESENCE UNSPECIFIED, UNSPECIFIED VESSEL OR LESION TYPE, UNSPECIFIED WHETHER NATIVE OR TRANSPLANTED HEART: ICD-10-CM

## 2017-08-31 DIAGNOSIS — D50.8 IRON DEFICIENCY ANEMIA SECONDARY TO INADEQUATE DIETARY IRON INTAKE: ICD-10-CM

## 2017-08-31 DIAGNOSIS — I10 ESSENTIAL HYPERTENSION: ICD-10-CM

## 2017-08-31 RX ORDER — PRAVASTATIN SODIUM 40 MG/1
40 TABLET ORAL NIGHTLY
Qty: 90 TABLET | Refills: 3 | Status: ON HOLD | OUTPATIENT
Start: 2017-08-31 | End: 2018-11-26 | Stop reason: HOSPADM

## 2017-08-31 NOTE — TELEPHONE ENCOUNTER
----- Message from Bibiana Rios sent at 8/31/2017  3:35 PM CDT -----  Requesting refill pravastatin  40 MG tablet pt is out / please call into [facility] ... Call Zina Orta [phone] (home)     [facility] [address] [place] [address] [id number] [facility] [address]  Phone: [phone] Fax: [phone]

## 2017-08-31 NOTE — TELEPHONE ENCOUNTER
----- Message from Bibiana Rios sent at 8/31/2017  3:30 PM CDT -----  Pt requesting clonidine 0.1 MG tablet / lisinopril 40 MG tablet / plavix / ferrous sulfate 325 mg (65 mg iron) Tab tablet ... Her insurance is asking that Dr montilla in 90 day supply / call Zina Orta [phone] (home)     [facility] [address] [place] [address] [facility]  [address] [facility] [address]  Phone: [phone] Fax: [phone]    Spoke with zina, let her know refill request was sent to Dr. Shay

## 2017-09-01 RX ORDER — LISINOPRIL 40 MG/1
40 TABLET ORAL DAILY
Qty: 30 TABLET | Refills: 0 | Status: SHIPPED | OUTPATIENT
Start: 2017-09-01 | End: 2017-10-13 | Stop reason: SDUPTHER

## 2017-09-01 RX ORDER — CLOPIDOGREL BISULFATE 75 MG/1
75 TABLET ORAL DAILY
Qty: 30 TABLET | Refills: 0 | Status: SHIPPED | OUTPATIENT
Start: 2017-09-01 | End: 2017-10-06 | Stop reason: SDUPTHER

## 2017-09-01 RX ORDER — CLONIDINE HYDROCHLORIDE 0.1 MG/1
0.1 TABLET ORAL EVERY MORNING
Qty: 30 TABLET | Refills: 0 | Status: SHIPPED | OUTPATIENT
Start: 2017-09-01 | End: 2017-10-06 | Stop reason: SDUPTHER

## 2017-09-01 RX ORDER — FERROUS SULFATE 325(65) MG
1 TABLET ORAL DAILY
Qty: 90 TABLET | Refills: 0 | Status: SHIPPED | OUTPATIENT
Start: 2017-09-01 | End: 2017-12-04 | Stop reason: SDUPTHER

## 2017-10-03 DIAGNOSIS — D50.8 IRON DEFICIENCY ANEMIA SECONDARY TO INADEQUATE DIETARY IRON INTAKE: ICD-10-CM

## 2017-10-03 DIAGNOSIS — I10 ESSENTIAL HYPERTENSION: ICD-10-CM

## 2017-10-03 DIAGNOSIS — I25.10 CORONARY ARTERY DISEASE, ANGINA PRESENCE UNSPECIFIED, UNSPECIFIED VESSEL OR LESION TYPE, UNSPECIFIED WHETHER NATIVE OR TRANSPLANTED HEART: ICD-10-CM

## 2017-10-05 RX ORDER — LISINOPRIL 40 MG/1
TABLET ORAL
Qty: 30 TABLET | Refills: 0 | OUTPATIENT
Start: 2017-10-05

## 2017-10-05 RX ORDER — FERROUS SULFATE 325(65) MG
TABLET ORAL
Qty: 90 TABLET | Refills: 0 | OUTPATIENT
Start: 2017-10-05

## 2017-10-05 RX ORDER — CLOPIDOGREL BISULFATE 75 MG/1
TABLET ORAL
Qty: 30 TABLET | Refills: 0 | OUTPATIENT
Start: 2017-10-05

## 2017-10-05 RX ORDER — CLONIDINE HYDROCHLORIDE 0.1 MG/1
TABLET ORAL
Qty: 30 TABLET | Refills: 0 | OUTPATIENT
Start: 2017-10-05

## 2017-10-06 DIAGNOSIS — I10 ESSENTIAL HYPERTENSION: ICD-10-CM

## 2017-10-06 DIAGNOSIS — I25.10 CORONARY ARTERY DISEASE, ANGINA PRESENCE UNSPECIFIED, UNSPECIFIED VESSEL OR LESION TYPE, UNSPECIFIED WHETHER NATIVE OR TRANSPLANTED HEART: ICD-10-CM

## 2017-10-06 NOTE — TELEPHONE ENCOUNTER
----- Message from Colton Hunter sent at 10/6/2017 10:47 AM CDT -----  Contact: self  Patient need a refill on clopidogrel, cloNIDine please [facility] to [facility], any questions please call back at [phone] (home)     [facility] [address] [place] [address] [place] - [address] [email or web address] [facility]  [address] [facility] [address]  Phone: [phone] Fax: [phone]

## 2017-10-07 RX ORDER — CLONIDINE HYDROCHLORIDE 0.1 MG/1
0.1 TABLET ORAL EVERY MORNING
Qty: 30 TABLET | Refills: 0 | Status: SHIPPED | OUTPATIENT
Start: 2017-10-07 | End: 2017-11-16 | Stop reason: SDUPTHER

## 2017-10-07 RX ORDER — CLOPIDOGREL BISULFATE 75 MG/1
75 TABLET ORAL DAILY
Qty: 30 TABLET | Refills: 0 | Status: SHIPPED | OUTPATIENT
Start: 2017-10-07 | End: 2017-11-16 | Stop reason: SDUPTHER

## 2017-10-13 DIAGNOSIS — I10 ESSENTIAL HYPERTENSION: ICD-10-CM

## 2017-10-18 ENCOUNTER — TELEPHONE (OUTPATIENT)
Dept: FAMILY MEDICINE | Facility: CLINIC | Age: 82
End: 2017-10-18

## 2017-10-18 NOTE — TELEPHONE ENCOUNTER
----- Message from Refugiolelo Sree sent at 10/17/2017 11:49 AM CDT -----  Contact: self   Patient need a refill for rx nifedipine (PROCARDIA-XL) 60 MG (OSM) 24 hr tablet, [facility], any questions please call back at [phone] (home)       [facility] [address] [place] [address] [address] [facility]  [address] [facility] [address]  Phone: [phone] Fax: [phone]

## 2017-10-18 NOTE — TELEPHONE ENCOUNTER
----- Message from Nina Balbuena sent at 10/17/2017 11:51 AM CDT -----  Contact: self   Patient need a refill for rx lisinopril (PRINIVIL,ZESTRIL) 40 MG tablet, [facility], any questions please call back at [phone] (home)       [facility] [address] [place] [address] [employer] [facility]  [address] [facility] [address]  Phone: [phone] Fax: [phone]

## 2017-10-19 RX ORDER — LISINOPRIL 40 MG/1
TABLET ORAL
Qty: 30 TABLET | Refills: 0 | Status: SHIPPED | OUTPATIENT
Start: 2017-10-19 | End: 2017-11-20 | Stop reason: SDUPTHER

## 2017-10-27 DIAGNOSIS — I10 ESSENTIAL HYPERTENSION: ICD-10-CM

## 2017-10-27 DIAGNOSIS — E87.6 HYPOPOTASSEMIA: ICD-10-CM

## 2017-10-30 RX ORDER — POTASSIUM CHLORIDE 1500 MG/1
TABLET, EXTENDED RELEASE ORAL
Qty: 180 TABLET | Refills: 0 | OUTPATIENT
Start: 2017-10-30

## 2017-10-30 RX ORDER — POTASSIUM CHLORIDE 20 MEQ/1
20 TABLET, EXTENDED RELEASE ORAL
Qty: 36 TABLET | Refills: 1 | Status: SHIPPED | OUTPATIENT
Start: 2017-10-30 | End: 2018-04-23 | Stop reason: SDUPTHER

## 2017-10-31 RX ORDER — LISINOPRIL 40 MG/1
TABLET ORAL
Qty: 30 TABLET | Refills: 0 | OUTPATIENT
Start: 2017-10-31

## 2017-11-07 DIAGNOSIS — I10 ESSENTIAL HYPERTENSION: ICD-10-CM

## 2017-11-07 DIAGNOSIS — I25.10 CORONARY ARTERY DISEASE, ANGINA PRESENCE UNSPECIFIED, UNSPECIFIED VESSEL OR LESION TYPE, UNSPECIFIED WHETHER NATIVE OR TRANSPLANTED HEART: ICD-10-CM

## 2017-11-08 RX ORDER — CLONIDINE HYDROCHLORIDE 0.1 MG/1
TABLET ORAL
Qty: 30 TABLET | Refills: 0 | OUTPATIENT
Start: 2017-11-08

## 2017-11-08 RX ORDER — CLOPIDOGREL BISULFATE 75 MG/1
TABLET ORAL
Qty: 30 TABLET | Refills: 0 | OUTPATIENT
Start: 2017-11-08

## 2017-11-10 DIAGNOSIS — I25.10 CORONARY ARTERY DISEASE, ANGINA PRESENCE UNSPECIFIED, UNSPECIFIED VESSEL OR LESION TYPE, UNSPECIFIED WHETHER NATIVE OR TRANSPLANTED HEART: ICD-10-CM

## 2017-11-10 DIAGNOSIS — I10 ESSENTIAL HYPERTENSION: ICD-10-CM

## 2017-11-10 RX ORDER — CLOPIDOGREL BISULFATE 75 MG/1
75 TABLET ORAL DAILY
Qty: 90 TABLET | Refills: 0 | OUTPATIENT
Start: 2017-11-10

## 2017-11-10 RX ORDER — CLONIDINE HYDROCHLORIDE 0.1 MG/1
0.1 TABLET ORAL EVERY MORNING
Qty: 90 TABLET | Refills: 0 | OUTPATIENT
Start: 2017-11-10

## 2017-11-10 NOTE — TELEPHONE ENCOUNTER
----- Message from Carmel Cain sent at 11/10/2017 10:02 AM CST -----  Contact: Daughter  Zina Orta, daughter [phone], Calling to get a a new Rx, pharmacy previously requested.   Calling to get Rx cloNIDine (CATAPRES) 0.1 MG tablet and Rx  clopidogrel (PLAVIX) 75 mg tablet. [facility] also called for patient. Requesting 90 days on both, with refills. Please advise. Thanks.  Also needing a follow up appointment.    [facility] [address] [address] [place] [place] [address]  Phone: [phone] Fax: [phone]

## 2017-11-13 ENCOUNTER — TELEPHONE (OUTPATIENT)
Dept: HEMATOLOGY/ONCOLOGY | Facility: CLINIC | Age: 82
End: 2017-11-13

## 2017-11-13 ENCOUNTER — LAB VISIT (OUTPATIENT)
Dept: LAB | Facility: HOSPITAL | Age: 82
End: 2017-11-13
Attending: INTERNAL MEDICINE
Payer: MEDICARE

## 2017-11-13 DIAGNOSIS — D64.9 ANEMIA, UNSPECIFIED TYPE: ICD-10-CM

## 2017-11-13 DIAGNOSIS — Z86.39 HISTORY OF IRON DEFICIENCY: ICD-10-CM

## 2017-11-13 DIAGNOSIS — D46.9 MDS (MYELODYSPLASTIC SYNDROME): ICD-10-CM

## 2017-11-13 LAB
ALBUMIN SERPL BCP-MCNC: 3 G/DL
ALP SERPL-CCNC: 83 U/L
ALT SERPL W/O P-5'-P-CCNC: 13 U/L
ANION GAP SERPL CALC-SCNC: 11 MMOL/L
AST SERPL-CCNC: 25 U/L
BASOPHILS NFR BLD: 1 %
BILIRUB SERPL-MCNC: 0.5 MG/DL
BUN SERPL-MCNC: 27 MG/DL
CALCIUM SERPL-MCNC: 9.3 MG/DL
CHLORIDE SERPL-SCNC: 112 MMOL/L
CO2 SERPL-SCNC: 18 MMOL/L
CREAT SERPL-MCNC: 1 MG/DL
DIFFERENTIAL METHOD: ABNORMAL
EOSINOPHIL NFR BLD: 6 %
ERYTHROCYTE [DISTWIDTH] IN BLOOD BY AUTOMATED COUNT: 13.3 %
EST. GFR  (AFRICAN AMERICAN): 57 ML/MIN/1.73 M^2
EST. GFR  (NON AFRICAN AMERICAN): 49 ML/MIN/1.73 M^2
FERRITIN SERPL-MCNC: 1006 NG/ML
GIANT PLATELETS BLD QL SMEAR: PRESENT
GLUCOSE SERPL-MCNC: 116 MG/DL
HCT VFR BLD AUTO: 34.4 %
HGB BLD-MCNC: 11.4 G/DL
IRON SERPL-MCNC: 60 UG/DL
LYMPHOCYTES NFR BLD: 26 %
MCH RBC QN AUTO: 33.2 PG
MCHC RBC AUTO-ENTMCNC: 33.2 G/DL
MCV RBC AUTO: 100 FL
MONOCYTES NFR BLD: 4 %
NEUTROPHILS NFR BLD: 63 %
PLATELET # BLD AUTO: 117 K/UL
PLATELET BLD QL SMEAR: ABNORMAL
PMV BLD AUTO: 11.9 FL
POTASSIUM SERPL-SCNC: 3.8 MMOL/L
PROT SERPL-MCNC: 7.3 G/DL
RBC # BLD AUTO: 3.44 M/UL
SATURATED IRON: 23 %
SODIUM SERPL-SCNC: 141 MMOL/L
TOTAL IRON BINDING CAPACITY: 260 UG/DL
TRANSFERRIN SERPL-MCNC: 176 MG/DL
WBC # BLD AUTO: 5.6 K/UL

## 2017-11-13 PROCEDURE — 80053 COMPREHEN METABOLIC PANEL: CPT

## 2017-11-13 PROCEDURE — 85007 BL SMEAR W/DIFF WBC COUNT: CPT

## 2017-11-13 PROCEDURE — 85027 COMPLETE CBC AUTOMATED: CPT

## 2017-11-13 PROCEDURE — 83540 ASSAY OF IRON: CPT

## 2017-11-13 PROCEDURE — 36415 COLL VENOUS BLD VENIPUNCTURE: CPT

## 2017-11-13 PROCEDURE — 82728 ASSAY OF FERRITIN: CPT

## 2017-11-13 PROCEDURE — 83520 IMMUNOASSAY QUANT NOS NONAB: CPT | Mod: 59

## 2017-11-13 NOTE — TELEPHONE ENCOUNTER
----- Message from Patricia Blackwood sent at 11/10/2017  3:48 PM CST -----  Zina Potter, daughter of Mrs. Odell, said that her mother's medications were called in last Monday (11/06/17) but no one has called the meds in nor has anyone called Mrs. Potter ([phone]).  Please call asa.  Thank you.

## 2017-11-14 LAB
KAPPA LC SER QL IA: 9.91 MG/DL
KAPPA LC/LAMBDA SER IA: 2.23
LAMBDA LC SER QL IA: 4.44 MG/DL

## 2017-11-14 RX ORDER — CLONIDINE HYDROCHLORIDE 0.1 MG/1
TABLET ORAL
Qty: 30 TABLET | Refills: 0 | OUTPATIENT
Start: 2017-11-14

## 2017-11-14 RX ORDER — CLOPIDOGREL BISULFATE 75 MG/1
TABLET ORAL
Qty: 30 TABLET | Refills: 0 | OUTPATIENT
Start: 2017-11-14

## 2017-11-16 ENCOUNTER — OFFICE VISIT (OUTPATIENT)
Dept: HEMATOLOGY/ONCOLOGY | Facility: CLINIC | Age: 82
End: 2017-11-16
Payer: MEDICARE

## 2017-11-16 VITALS
HEIGHT: 60 IN | SYSTOLIC BLOOD PRESSURE: 132 MMHG | HEART RATE: 52 BPM | BODY MASS INDEX: 19.61 KG/M2 | WEIGHT: 99.88 LBS | RESPIRATION RATE: 20 BRPM | DIASTOLIC BLOOD PRESSURE: 64 MMHG

## 2017-11-16 DIAGNOSIS — I10 ESSENTIAL HYPERTENSION: ICD-10-CM

## 2017-11-16 DIAGNOSIS — N18.30 ANEMIA ASSOCIATED WITH STAGE 3 CHRONIC RENAL FAILURE: Primary | ICD-10-CM

## 2017-11-16 DIAGNOSIS — I25.10 CORONARY ARTERY DISEASE, ANGINA PRESENCE UNSPECIFIED, UNSPECIFIED VESSEL OR LESION TYPE, UNSPECIFIED WHETHER NATIVE OR TRANSPLANTED HEART: ICD-10-CM

## 2017-11-16 DIAGNOSIS — D63.1 ANEMIA ASSOCIATED WITH STAGE 3 CHRONIC RENAL FAILURE: Primary | ICD-10-CM

## 2017-11-16 DIAGNOSIS — M81.8 OTHER OSTEOPOROSIS WITHOUT CURRENT PATHOLOGICAL FRACTURE: ICD-10-CM

## 2017-11-16 DIAGNOSIS — R79.89 ELEVATED FERRITIN: ICD-10-CM

## 2017-11-16 PROCEDURE — 99214 OFFICE O/P EST MOD 30 MIN: CPT | Mod: S$GLB,,, | Performed by: INTERNAL MEDICINE

## 2017-11-16 PROCEDURE — 99499 UNLISTED E&M SERVICE: CPT | Mod: S$GLB,,, | Performed by: INTERNAL MEDICINE

## 2017-11-16 PROCEDURE — 99999 PR PBB SHADOW E&M-EST. PATIENT-LVL III: CPT | Mod: PBBFAC,,, | Performed by: INTERNAL MEDICINE

## 2017-11-16 RX ORDER — CLONIDINE HYDROCHLORIDE 0.1 MG/1
0.1 TABLET ORAL EVERY MORNING
Qty: 90 TABLET | Refills: 3 | Status: SHIPPED | OUTPATIENT
Start: 2017-11-16

## 2017-11-16 RX ORDER — CLOPIDOGREL BISULFATE 75 MG/1
75 TABLET ORAL DAILY
Qty: 90 TABLET | Refills: 3 | Status: SHIPPED | OUTPATIENT
Start: 2017-11-16

## 2017-11-16 NOTE — PROGRESS NOTES
CHIEF COMPLAINT:  Pt smiling today    HISTORY OF PRESENT ILLNESS:  The patient is followup regarding anemia, kappa   light chain gammopathy with suspected myeloma versus myelodysplastic syndrome.  Pt's daughter is worried that she may have an ear infection   Pt here for hyperferritinemia and anemia renal disease and suspected myeloma  Cannot use exjade because calculated GFR is too low    SHe does have a history of stroke and carotid artery stenosis.   Daughter states she is sleeping more than 50 % of the day, not eating  She is tolerating meds lisinopril and Procardia for HTN   She needs refills for catapress and plavix  She is smiling today   Indicates no pain      Current Outpatient Prescriptions:     aspirin (ECOTRIN) 81 MG EC tablet, Take 81 mg by mouth once daily., Disp: , Rfl:     cholecalciferol, vitamin D3, 400 unit Tab, Take 400 Units by mouth once daily. , Disp: , Rfl:     cloNIDine (CATAPRES) 0.1 MG tablet, Take 1 tablet (0.1 mg total) by mouth every morning., Disp: 30 tablet, Rfl: 0    clopidogrel (PLAVIX) 75 mg tablet, Take 1 tablet (75 mg total) by mouth once daily., Disp: 30 tablet, Rfl: 0    cyanocobalamin (VITAMIN B-12) 1000 MCG tablet, Take 1,000 mcg by mouth once daily. , Disp: , Rfl:     ferrous sulfate 325 mg (65 mg iron) Tab tablet, Take 1 tablet (325 mg total) by mouth once daily., Disp: 90 tablet, Rfl: 0    furosemide (LASIX) 40 MG tablet, Take 1 tablet (40 mg total) by mouth every Mon, Wed, Fri., Disp: , Rfl:     lisinopril (PRINIVIL,ZESTRIL) 40 MG tablet, TAKE ONE TABLET BY MOUTH ONCE DAILY, Disp: 30 tablet, Rfl: 0    nifedipine (PROCARDIA-XL) 60 MG (OSM) 24 hr tablet, TAKE ONE TABLET BY MOUTH ONCE DAILY, Disp: 90 tablet, Rfl: 3    potassium chloride SA (KLOR-CON M20) 20 MEQ tablet, Take 1 tablet (20 mEq total) by mouth every Mon, Wed, Fri., Disp: 36 tablet, Rfl: 1    pravastatin (PRAVACHOL) 40 MG tablet, Take 1 tablet (40 mg total) by mouth every evening., Disp: 90 tablet, Rfl:  3  No current facility-administered medications for this visit.     Facility-Administered Medications Ordered in Other Visits:     epoetin yamila injection 20,000 Units, 20,000 Units, Subcutaneous, 1 time in Clinic/HOD, Brittnee Shay MD    REVIEW OF SYSTEMS:  GENERAL:  No fever or chills.  Weight stable increasing fatigue  HEENT:  No photophobia, rhinorrhea, sinus congestion, tinnitus, gingival   bleeding, oral ulcers, or sore throat.  RESPIRATORY:  No shortness of breath, cough, or wheezing.  GASTROINTESTINAL:  No abdominal pain, dysphagia, emesis, diarrhea, or   constipation.  No heartburn, abdominal distention, melena, or hematochezia.  GENITOURINARY:  No urinary frequency, hesitancy, dysuria, or hematuria.  MUSCULOSKELETAL:  No neck pain, back pain  NEUROLOGICAL:  No headaches,   Localized paresthesias  PSYCH:  No agitation, change in behavior, anxiety, or depression.      PHYSICAL EXAMINATION:  /64 (BP Location: Right arm, Patient Position: Sitting, BP Method: Medium (Automatic))   Pulse (!) 52   Resp 20   Ht 5' (1.524 m)   Wt 45.3 kg (99 lb 13.9 oz)   LMP  (LMP Unknown)   BMI 19.50 kg/m²     GENERAL:  Thin body habitus cachectic.  HEENT:  Temporal wasting. Ears impacted with wax bilaterally  PSYCH:  Pleasant affect today. No pain  LUNGS:  Clear, no use of accessory muscles during respiration.  CARDIAC:  S1, S2. Bradycardia no murmur  ABDOMEN:  Nondistended. Soft , positive paresthesias  EXTREMITIES:  Thin, no cyanosis,positive  edema.  SKIN:  Warm, dry. POSITIVE tenting  Edema stable at 1 + Bilateral     LABS:      Lab Results   Component Value Date    WBC 5.60 11/13/2017    HGB 11.4 (L) 11/13/2017    HCT 34.4 (L) 11/13/2017     (H) 11/13/2017     (L) 11/13/2017       CMP  Sodium   Date Value Ref Range Status   11/13/2017 141 136 - 145 mmol/L Final     Potassium   Date Value Ref Range Status   11/13/2017 3.8 3.5 - 5.1 mmol/L Final     Chloride   Date Value Ref Range Status    11/13/2017 112 (H) 95 - 110 mmol/L Final     CO2   Date Value Ref Range Status   11/13/2017 18 (L) 23 - 29 mmol/L Final     Glucose   Date Value Ref Range Status   11/13/2017 116 (H) 70 - 110 mg/dL Final     BUN, Bld   Date Value Ref Range Status   11/13/2017 27 10 - 30 mg/dL Final     Creatinine   Date Value Ref Range Status   11/13/2017 1.0 0.5 - 1.4 mg/dL Final     Calcium   Date Value Ref Range Status   11/13/2017 9.3 8.7 - 10.5 mg/dL Final     Total Protein   Date Value Ref Range Status   11/13/2017 7.3 6.0 - 8.4 g/dL Final     Albumin   Date Value Ref Range Status   11/13/2017 3.0 (L) 3.5 - 5.2 g/dL Final     Total Bilirubin   Date Value Ref Range Status   11/13/2017 0.5 0.1 - 1.0 mg/dL Final     Comment:     For infants and newborns, interpretation of results should be based  on gestational age, weight and in agreement with clinical  observations.  Premature Infant recommended reference ranges:  Up to 24 hours.............<8.0 mg/dL  Up to 48 hours............<12.0 mg/dL  3-5 days..................<15.0 mg/dL  6-29 days.................<15.0 mg/dL       Alkaline Phosphatase   Date Value Ref Range Status   11/13/2017 83 55 - 135 U/L Final     AST   Date Value Ref Range Status   11/13/2017 25 10 - 40 U/L Final     ALT   Date Value Ref Range Status   11/13/2017 13 10 - 44 U/L Final     Anion Gap   Date Value Ref Range Status   11/13/2017 11 8 - 16 mmol/L Final     eGFR if    Date Value Ref Range Status   11/13/2017 57 (A) >60 mL/min/1.73 m^2 Final     eGFR if non    Date Value Ref Range Status   11/13/2017 49 (A) >60 mL/min/1.73 m^2 Final     Comment:     Calculation used to obtain the estimated glomerular filtration  rate (eGFR) is the CKD-EPI equation.       ferritin level was 20/1/40 and is decreased to 1191  Iron is 60 as it was 5 months ago in saturation of 25 as it was 24 5 months ago  Lab Results   Component Value Date    FERRITIN 1,006 (H) 11/13/2017      Ref Range &  Units 3d ago 1yr ago     Kappa Free Light Chains 0.33 - 1.94 mg/dL 9.91   12.39      Lambda Free Light Chains 0.57 - 2.63 mg/dL 4.44   4.48      Kappa/Lambda FLC Ratio 0.26 - 1.65 2.23   2.77R           Anemia associated with stage 3 chronic renal failure  -     CBC auto differential; Future; Expected date: 11/16/2017  -     Ferritin; Future; Expected date: 11/16/2017  -     Iron and TIBC; Future; Expected date: 11/16/2017    Essential hypertension  -     cloNIDine (CATAPRES) 0.1 MG tablet; Take 1 tablet (0.1 mg total) by mouth every morning.  Dispense: 90 tablet; Refill: 3    Coronary artery disease, angina presence unspecified, unspecified vessel or lesion type, unspecified whether native or transplanted heart  -     clopidogrel (PLAVIX) 75 mg tablet; Take 1 tablet (75 mg total) by mouth once daily.  Dispense: 90 tablet; Refill: 3    Elevated ferritin  -     CBC auto differential; Future; Expected date: 11/16/2017  -     Ferritin; Future; Expected date: 11/16/2017  -     Iron and TIBC; Future; Expected date: 11/16/2017    Other osteoporosis without current pathological fracture    ferritin stable  Light chains improving  Blood pressure stable with clonidine and ace inhibitors  Recheck cbc and reassess in 2 months   Unsafe to chelate due to renal insuffuciency  Cont obs alone   Explained possibility of a myelomatous process  Pt prefers no intervention , declines the need for imaging studies  NO FURTHER IV IRON at the moment but must follow counts  Explained why she needs her pressure meds even though her pressure is normal today  It is the meds keeping her normal  No procrit necessary today    Thankfully the patient has no symptoms at this moment in time and the patient herself promises that she will eat right after our visit go home and take her medication    RTC 2 weeks with cbc and diff and cmp

## 2017-11-20 DIAGNOSIS — I10 ESSENTIAL HYPERTENSION: ICD-10-CM

## 2017-11-22 RX ORDER — LISINOPRIL 40 MG/1
TABLET ORAL
Qty: 30 TABLET | Refills: 0 | Status: SHIPPED | OUTPATIENT
Start: 2017-11-22 | End: 2017-12-08 | Stop reason: SDUPTHER

## 2017-12-04 DIAGNOSIS — D50.8 IRON DEFICIENCY ANEMIA SECONDARY TO INADEQUATE DIETARY IRON INTAKE: ICD-10-CM

## 2017-12-06 ENCOUNTER — DOCUMENTATION ONLY (OUTPATIENT)
Dept: FAMILY MEDICINE | Facility: CLINIC | Age: 82
End: 2017-12-06

## 2017-12-06 ENCOUNTER — TELEPHONE (OUTPATIENT)
Dept: HEMATOLOGY/ONCOLOGY | Facility: CLINIC | Age: 82
End: 2017-12-06

## 2017-12-06 RX ORDER — FERROUS SULFATE 325(65) MG
TABLET ORAL
Qty: 90 TABLET | Refills: 0 | Status: SHIPPED | OUTPATIENT
Start: 2017-12-06 | End: 2018-03-19 | Stop reason: SDUPTHER

## 2017-12-06 NOTE — TELEPHONE ENCOUNTER
----- Message from Juan Miguel Quiñones sent at 12/6/2017 10:11 AM CST -----  Contact: Mere/[facility]  Mere called in regarding the attached patient and her Rx for 325 mg of Serrous Sulfate.  Mere stated Rx was E-Scribed but no signature was on it.      [facility] - [place], [place] - [address]  [address] [address] [employer]  [facility] [address]  Phone: [phone] Fax: [phone]

## 2017-12-06 NOTE — PROGRESS NOTES
Pre-Visit Chart Review  For Appointment Scheduled on 12/8/17.    Health Maintenance Due   Topic Date Due    Influenza Vaccine  08/01/2017

## 2017-12-08 ENCOUNTER — OFFICE VISIT (OUTPATIENT)
Dept: FAMILY MEDICINE | Facility: CLINIC | Age: 82
End: 2017-12-08
Payer: MEDICARE

## 2017-12-08 VITALS
HEIGHT: 60 IN | BODY MASS INDEX: 20.94 KG/M2 | TEMPERATURE: 98 F | HEART RATE: 47 BPM | WEIGHT: 106.69 LBS | SYSTOLIC BLOOD PRESSURE: 160 MMHG | DIASTOLIC BLOOD PRESSURE: 60 MMHG

## 2017-12-08 DIAGNOSIS — N18.30 STAGE 3 CHRONIC KIDNEY DISEASE: ICD-10-CM

## 2017-12-08 DIAGNOSIS — H61.23 BILATERAL IMPACTED CERUMEN: ICD-10-CM

## 2017-12-08 DIAGNOSIS — E11.69 HYPERLIPIDEMIA ASSOCIATED WITH TYPE 2 DIABETES MELLITUS: ICD-10-CM

## 2017-12-08 DIAGNOSIS — R63.6 UNDERWEIGHT: ICD-10-CM

## 2017-12-08 DIAGNOSIS — E11.59 HYPERTENSION ASSOCIATED WITH DIABETES: ICD-10-CM

## 2017-12-08 DIAGNOSIS — E78.5 HYPERLIPIDEMIA ASSOCIATED WITH TYPE 2 DIABETES MELLITUS: ICD-10-CM

## 2017-12-08 DIAGNOSIS — I15.2 HYPERTENSION ASSOCIATED WITH DIABETES: ICD-10-CM

## 2017-12-08 DIAGNOSIS — E11.21 TYPE 2 DIABETES MELLITUS WITH DIABETIC NEPHROPATHY, WITHOUT LONG-TERM CURRENT USE OF INSULIN: Primary | ICD-10-CM

## 2017-12-08 DIAGNOSIS — Z23 IMMUNIZATION DUE: ICD-10-CM

## 2017-12-08 PROCEDURE — 99214 OFFICE O/P EST MOD 30 MIN: CPT | Mod: 25,S$GLB,, | Performed by: FAMILY MEDICINE

## 2017-12-08 PROCEDURE — G0008 ADMIN INFLUENZA VIRUS VAC: HCPCS | Mod: S$GLB,,, | Performed by: FAMILY MEDICINE

## 2017-12-08 PROCEDURE — 99499 UNLISTED E&M SERVICE: CPT | Mod: S$GLB,,, | Performed by: FAMILY MEDICINE

## 2017-12-08 PROCEDURE — 99999 PR PBB SHADOW E&M-EST. PATIENT-LVL V: CPT | Mod: PBBFAC,,, | Performed by: FAMILY MEDICINE

## 2017-12-08 PROCEDURE — 90662 IIV NO PRSV INCREASED AG IM: CPT | Mod: S$GLB,,, | Performed by: FAMILY MEDICINE

## 2017-12-08 RX ORDER — ACETAMINOPHEN 500 MG
1 TABLET ORAL DAILY
COMMUNITY

## 2017-12-08 RX ORDER — LISINOPRIL 40 MG/1
40 TABLET ORAL DAILY
Qty: 90 TABLET | Refills: 3 | Status: SHIPPED | OUTPATIENT
Start: 2017-12-08

## 2017-12-08 RX ORDER — DOCUSATE SODIUM 100 MG/1
100 CAPSULE, LIQUID FILLED ORAL NIGHTLY
COMMUNITY

## 2017-12-08 NOTE — PROGRESS NOTES
CHIEF COMPLAINT: follow up      HISTORY OF PRESENT ILLNESS:  Jaqui Odell is a 89 y.o. female who presents to clinic forfollow up.  She is accompanied by a family member.     1. Type 2 DM: A last HgA1c was was 5.4%.  Her amaryl has been discontinued. She is up to date on her pneumovax, prevnar 13. She is on a statin, ASA. She follows up with podiatry.    2 HTN: Patient is established with cardiology. She is on procardia, lisinopril, lasix,catapres, potassium supplementation.     3. Hyperlipidemia: She is on pravastatin, ASA.  She denies any myalgia, dark colored urine.    4. She is due for an influenza vaccine.     5. She has b/l cerumen impactions and cannot hear.    6. She did not qualify for hospice.     REVIEW OF SYSTEMS:  The patient denies any fever, chills, night sweats, headaches, vision changes, difficulty speaking or swallowing,  weight loss, weight gain, chest pain, palpitations, shortness of breath, cough, nausea, vomiting, abdominal pain, dysuria, diarrhea, constipation, hematuria, hematochezia, melena, changes in her hair, skin, nails, numbness or weakness in her extremities, erythema, pain or swelling over any of her joints, myalgias, swollen glands, easy bruising, fatigue.    MEDICATIONS:   Reviewed and/or reconciled in EPIC    ALLERGIES:  Reviewed and/or reconciled in [facility]    PAST MEDICAL/SURGICAL HISTORY:   Past Medical History:   Diagnosis Date    Callus     Carotid artery occlusion     Diabetes mellitus     Diabetes mellitus, type 2     Hyperlipidemia     Hypertension     Stroke     Syncope and collapse       Past Surgical History:   Procedure Laterality Date    adrenal insufficiency      CARDIAC CATHETERIZATION      CORONARY ANGIOPLASTY      HYSTERECTOMY         FAMILY HISTORY:  No family history on file.    SOCIAL HISTORY:    Social History     Social History    Marital status:      Spouse name: N/A    Number of children: N/A    Years of education: N/A     Occupational  History    Not on file.     Social History Main Topics    Smoking status: Never Smoker    Smokeless tobacco: Never Used    Alcohol use No    Drug use: No    Sexual activity: No     Other Topics Concern    Not on file     Social History Narrative    No narrative on file       PHYSICAL EXAM:  VITAL SIGNS:   There were no vitals filed for this visit.  GENERAL:  Patient appears underweight, sitting in wheelchair, in no acute distress.  HEENT:  Atraumatic, normocephalic, PERRLA, EOMI, no conjunctival injection, sclerae are anicteric, b/l cerumen impaction, gross hearing intact to whisper, MMM, no oropharygneal erythema or exudate.  NECK:  Supple, normal ROM, trachea is midline , no supraclavicular or cervical LAD or masses palpated.   CARDIOVASCULAR:  RRR, normal S1 and S2, no m/r/g.  RESPIRATORY:  CTA b/l, no wheezes, rhonchi, rales.  No increased work of breathing, no  use of accessory muscles.  ABDOMEN:  Soft, nontender, nondistended, normoactive bowel sounds in all four quadrants, no rebound or guarding, no HSM or masses palpated.  Normal percussion.  EXTREMITIES:  2+ DP pulses b/l, no edema.  SKIN:  Warm, no lesions on exposed skin.  NEUROMUSCULAR:  Cranial nerves II-XII grossly intact. No clubbing or cyanosis of digits/nails.   PSYCH:  Patient is alert and oriented to person, time, place. She is appropriately dressed and groomed. There is normal eye contact. Rate and tone of speech is normal. Normal insight, judgement. Normal thought content and process.     LABORATORY/IMAGING STUDIES: pending    ASSESSMENT/PLAN: This is a 89 y.o. female who presents to clinic for follow up    1. Type 2 diabetes mellitus with diabetic nephropathy, without long-term current use of insulin  See below    2. Underweight  See below    3. Stage 3 chronic kidney disease  Stable, continue to monitor.     4. Hypertension associated with diabetes  See below    5. Hyperlipidemia associated with type 2 diabetes mellitus  - Lipid panel;  Future  - Hemoglobin A1c; Future    6. Immunization due  - Influenza - High Dose (65+) (PF) (IM)    7. Bilateral impacted cerumen  - Ambulatory referral to ENT    Patient readiness: acceptance and barriers:none    During the course of the visit the patient was educated and counseled about the following:     Diabetes: HgA1c if still within normal range, will take diagnosis of type 2 DM off of chart and closely monitor blood sugars  Hypertension:   Medication: no change.   Underweight: stable, follow up with oncology    Goals: Diabetes: Maintain Hemoglobin A1C below 7 and Hypertension: Reduce Blood Pressure underweight: increase calorie intake/weight gain    Did patient meet goals/outcomes: no    The following self management tools provided: declined    Patient Instructions (the written plan) was given to the patient/family.     Time spent with patient: 30 minutes    FOLLOW UP: 6 months    Ruth Pink MD

## 2017-12-18 DIAGNOSIS — I10 ESSENTIAL HYPERTENSION: ICD-10-CM

## 2017-12-22 RX ORDER — LISINOPRIL 40 MG/1
TABLET ORAL
Qty: 30 TABLET | Refills: 0 | Status: SHIPPED | OUTPATIENT
Start: 2017-12-22 | End: 2018-03-16 | Stop reason: SDUPTHER

## 2018-01-10 ENCOUNTER — LAB VISIT (OUTPATIENT)
Dept: LAB | Facility: HOSPITAL | Age: 83
End: 2018-01-10
Attending: INTERNAL MEDICINE
Payer: MEDICARE

## 2018-01-10 DIAGNOSIS — N18.30 ANEMIA ASSOCIATED WITH STAGE 3 CHRONIC RENAL FAILURE: ICD-10-CM

## 2018-01-10 DIAGNOSIS — D63.1 ANEMIA ASSOCIATED WITH STAGE 3 CHRONIC RENAL FAILURE: ICD-10-CM

## 2018-01-10 DIAGNOSIS — R79.89 ELEVATED FERRITIN: ICD-10-CM

## 2018-01-10 LAB
BASOPHILS # BLD AUTO: 0 K/UL
BASOPHILS NFR BLD: 0.6 %
DIFFERENTIAL METHOD: ABNORMAL
EOSINOPHIL # BLD AUTO: 0.4 K/UL
EOSINOPHIL NFR BLD: 7.8 %
ERYTHROCYTE [DISTWIDTH] IN BLOOD BY AUTOMATED COUNT: 13.5 %
FERRITIN SERPL-MCNC: 934 NG/ML
HCT VFR BLD AUTO: 31.1 %
HGB BLD-MCNC: 10.3 G/DL
IRON SERPL-MCNC: 61 UG/DL
LYMPHOCYTES # BLD AUTO: 2 K/UL
LYMPHOCYTES NFR BLD: 43.1 %
MCH RBC QN AUTO: 32.8 PG
MCHC RBC AUTO-ENTMCNC: 32.9 G/DL
MCV RBC AUTO: 100 FL
MONOCYTES # BLD AUTO: 0.4 K/UL
MONOCYTES NFR BLD: 7.7 %
NEUTROPHILS # BLD AUTO: 1.9 K/UL
NEUTROPHILS NFR BLD: 40.8 %
PLATELET # BLD AUTO: 110 K/UL
PMV BLD AUTO: 11.7 FL
RBC # BLD AUTO: 3.13 M/UL
SATURATED IRON: 25 %
TOTAL IRON BINDING CAPACITY: 244 UG/DL
TRANSFERRIN SERPL-MCNC: 165 MG/DL
WBC # BLD AUTO: 4.7 K/UL

## 2018-01-10 PROCEDURE — 85025 COMPLETE CBC W/AUTO DIFF WBC: CPT

## 2018-01-10 PROCEDURE — 83540 ASSAY OF IRON: CPT

## 2018-01-10 PROCEDURE — 82728 ASSAY OF FERRITIN: CPT

## 2018-01-10 PROCEDURE — 36415 COLL VENOUS BLD VENIPUNCTURE: CPT

## 2018-01-16 ENCOUNTER — TELEPHONE (OUTPATIENT)
Dept: HEMATOLOGY/ONCOLOGY | Facility: CLINIC | Age: 83
End: 2018-01-16

## 2018-01-16 NOTE — TELEPHONE ENCOUNTER
----- Message from Karla Salcedo sent at 1/16/2018  4:22 PM CST -----  Daughter/Zina is calling to reschedule patient's appointment due to weather tomorrow. Please call back to reschedule at [phone].

## 2018-01-22 ENCOUNTER — OFFICE VISIT (OUTPATIENT)
Dept: HEMATOLOGY/ONCOLOGY | Facility: CLINIC | Age: 83
End: 2018-01-22
Payer: MEDICARE

## 2018-01-22 VITALS
DIASTOLIC BLOOD PRESSURE: 91 MMHG | SYSTOLIC BLOOD PRESSURE: 231 MMHG | TEMPERATURE: 98 F | RESPIRATION RATE: 20 BRPM | HEIGHT: 60 IN | HEART RATE: 58 BPM

## 2018-01-22 DIAGNOSIS — C90.00 MULTIPLE MYELOMA, REMISSION STATUS UNSPECIFIED: ICD-10-CM

## 2018-01-22 DIAGNOSIS — E83.19 IRON OVERLOAD: ICD-10-CM

## 2018-01-22 DIAGNOSIS — B35.1 ONYCHOMYCOSIS: Primary | ICD-10-CM

## 2018-01-22 PROCEDURE — 99214 OFFICE O/P EST MOD 30 MIN: CPT | Mod: S$GLB,,, | Performed by: INTERNAL MEDICINE

## 2018-01-22 PROCEDURE — 99499 UNLISTED E&M SERVICE: CPT | Mod: S$GLB,,, | Performed by: INTERNAL MEDICINE

## 2018-01-22 PROCEDURE — 99999 PR PBB SHADOW E&M-EST. PATIENT-LVL IV: CPT | Mod: PBBFAC,,, | Performed by: INTERNAL MEDICINE

## 2018-01-22 NOTE — PROGRESS NOTES
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  Pt's daughter is worried that she may have an ear infection   Pt here for hyperferritinemia and anemia renal disease and suspected myeloma  Cannot use exjade because calculated GFR is too low    SHe does have a history of stroke and carotid artery stenosis.   Daughter states she is sleeping more than 50 % of the day, not eating  She is tolerating meds lisinopril and Procardia for HTN     She did not take her meds this am      Current Outpatient Prescriptions:     aspirin (ECOTRIN) 81 MG EC tablet, Take 81 mg by mouth once daily., Disp: , Rfl:     cholecalciferol, vitamin D3, (VITAMIN D3) 2,000 unit Cap, Take 1 capsule by mouth once daily., Disp: , Rfl:     cloNIDine (CATAPRES) 0.1 MG tablet, Take 1 tablet (0.1 mg total) by mouth every morning., Disp: 90 tablet, Rfl: 3    clopidogrel (PLAVIX) 75 mg tablet, Take 1 tablet (75 mg total) by mouth once daily., Disp: 90 tablet, Rfl: 3    cyanocobalamin (VITAMIN B-12) 1000 MCG tablet, Take 1,000 mcg by mouth once daily. , Disp: , Rfl:     docusate sodium (COLACE) 100 MG capsule, Take 100 mg by mouth every evening., Disp: , Rfl:     ferrous sulfate 325 mg (65 mg iron) Tab tablet, TAKE ONE TABLET BY MOUTH ONCE DAILY, Disp: 90 tablet, Rfl: 0    furosemide (LASIX) 40 MG tablet, Take 1 tablet (40 mg total) by mouth every Mon, Wed, Fri., Disp: , Rfl:     lisinopril (PRINIVIL,ZESTRIL) 40 MG tablet, Take 1 tablet (40 mg total) by mouth once daily., Disp: 90 tablet, Rfl: 3    lisinopril (PRINIVIL,ZESTRIL) 40 MG tablet, TAKE ONE TABLET BY MOUTH ONCE DAILY, Disp: 30 tablet, Rfl: 0    nifedipine (PROCARDIA-XL) 60 MG (OSM) 24 hr tablet, TAKE ONE TABLET BY MOUTH ONCE DAILY, Disp: 90 tablet, Rfl: 3    potassium chloride SA (KLOR-CON M20) 20 MEQ tablet, Take 1 tablet (20  mEq total) by mouth every Mon, Wed, Fri., Disp: 36 tablet, Rfl: 1    pravastatin (PRAVACHOL) 40 MG tablet, Take 1 tablet (40 mg total) by mouth every evening., Disp: 90 tablet, Rfl: 3  No current facility-administered medications for this visit.     Facility-Administered Medications Ordered in Other Visits:     epoetin yamila injection 20,000 Units, 20,000 Units, Subcutaneous, 1 time in Clinic/HOD, Brittnee Shay MD    REVIEW OF SYSTEMS:  GENERAL:  No fever or chills.  Weight stable increasing fatigue  HEENT:  No photophobia, rhinorrhea, sinus congestion, tinnitus, gingival   bleeding, oral ulcers, or sore throat.  RESPIRATORY:  No shortness of breath, cough, or wheezing.  GASTROINTESTINAL:  No abdominal pain, dysphagia, emesis, diarrhea, or   constipation.  No heartburn, abdominal distention, melena, or hematochezia.  GENITOURINARY:  No urinary frequency, hesitancy, dysuria, or hematuria.  MUSCULOSKELETAL:  No neck pain, back pain  NEUROLOGICAL:  No headaches,   Localized paresthesias  PSYCH:  No agitation, change in behavior, anxiety, or depression.      PHYSICAL EXAMINATION:  LMP  (LMP Unknown)     GENERAL:  Thin body habitus cachectic.  HEENT:  Temporal wasting. Ears impacted with wax bilaterally  PSYCH:  Pleasant affect today. No pain  LUNGS:  Clear, no use of accessory muscles during respiration.  CARDIAC:  S1, S2. Bradycardia no murmur  ABDOMEN:  Nondistended. Soft , positive paresthesias  EXTREMITIES:  Thin, no cyanosis,positive  edema.  SKIN:  Warm, dry. POSITIVE tenting  Edema stable at 1 + Bilateral   Toenails with nail thickening and disxcoloration  LABS:      Lab Results   Component Value Date    WBC 4.70 01/10/2018    HGB 10.3 (L) 01/10/2018    HCT 31.1 (L) 01/10/2018     (H) 01/10/2018     (L) 01/10/2018       CMP  Sodium   Date Value Ref Range Status   11/13/2017 141 136 - 145 mmol/L Final     Potassium   Date Value Ref Range Status   11/13/2017 3.8 3.5 - 5.1 mmol/L Final     Chloride    Date Value Ref Range Status   11/13/2017 112 (H) 95 - 110 mmol/L Final     CO2   Date Value Ref Range Status   11/13/2017 18 (L) 23 - 29 mmol/L Final     Glucose   Date Value Ref Range Status   11/13/2017 116 (H) 70 - 110 mg/dL Final     BUN, Bld   Date Value Ref Range Status   11/13/2017 27 10 - 30 mg/dL Final     Creatinine   Date Value Ref Range Status   11/13/2017 1.0 0.5 - 1.4 mg/dL Final     Calcium   Date Value Ref Range Status   11/13/2017 9.3 8.7 - 10.5 mg/dL Final     Total Protein   Date Value Ref Range Status   11/13/2017 7.3 6.0 - 8.4 g/dL Final     Albumin   Date Value Ref Range Status   11/13/2017 3.0 (L) 3.5 - 5.2 g/dL Final     Total Bilirubin   Date Value Ref Range Status   11/13/2017 0.5 0.1 - 1.0 mg/dL Final     Comment:     For infants and newborns, interpretation of results should be based  on gestational age, weight and in agreement with clinical  observations.  Premature Infant recommended reference ranges:  Up to 24 hours.............<8.0 mg/dL  Up to 48 hours............<12.0 mg/dL  3-5 days..................<15.0 mg/dL  6-29 days.................<15.0 mg/dL       Alkaline Phosphatase   Date Value Ref Range Status   11/13/2017 83 55 - 135 U/L Final     AST   Date Value Ref Range Status   11/13/2017 25 10 - 40 U/L Final     ALT   Date Value Ref Range Status   11/13/2017 13 10 - 44 U/L Final     Anion Gap   Date Value Ref Range Status   11/13/2017 11 8 - 16 mmol/L Final     eGFR if    Date Value Ref Range Status   11/13/2017 57 (A) >60 mL/min/1.73 m^2 Final     eGFR if non    Date Value Ref Range Status   11/13/2017 49 (A) >60 mL/min/1.73 m^2 Final     Comment:     Calculation used to obtain the estimated glomerular filtration  rate (eGFR) is the CKD-EPI equation.       ferritin level was 20/1/40 and is decreased to 1191  Iron is 60 as it was 5 months ago in saturation of 25 as it was 24 5 months ago  Lab Results   Component Value Date    FERRITIN 934 (H)  01/10/2018       Onychomycosis  -     CBC auto differential; Future; Expected date: 01/22/2018  -     CMP; Future; Expected date: 01/22/2018  -     FREE LT CHAIN ANAL; Future; Expected date: 01/22/2018  -     Ambulatory Referral to Podiatry    Multiple myeloma, remission status unspecified  -     CBC auto differential; Future; Expected date: 01/22/2018  -     CMP; Future; Expected date: 01/22/2018  -     FREE LT CHAIN ANAL; Future; Expected date: 01/22/2018  -     Ambulatory Referral to Podiatry    Iron overload        Recheck cbc and reassess in 4 weeks   To podiatry to help with onchycomycosis  Ferritin stabilizing   Explained possibility of a myelomatous process  Pt prefers no intervention , declines the need for imaging studies  NO FURTHER IV IRON at the moment but must follow counts  Explained why she needs her pressure meds even though her pressure is normal today  It is the meds keeping her normal  No procrit necessary today        RTC 4 weeks with cbc and diff and cmp

## 2018-01-23 ENCOUNTER — TELEPHONE (OUTPATIENT)
Dept: VASCULAR SURGERY | Facility: CLINIC | Age: 83
End: 2018-01-23

## 2018-01-23 NOTE — TELEPHONE ENCOUNTER
Spoke to daughter, message sent to Dr Stephens's staff in error, requesting that Dr Shay's staff to call her back as soon as possible.  Informed her that I will forward message to correct provider.

## 2018-01-23 NOTE — TELEPHONE ENCOUNTER
----- Message from Alysa Calvert sent at 1/23/2018  2:34 PM CST -----  Contact: daughterZina  Patient's daughterZina is waiting on a call back regarding medication for patient's feet and an appointment for a podiatry. Please call patient at [phone]. Thanks!

## 2018-01-29 ENCOUNTER — OFFICE VISIT (OUTPATIENT)
Dept: PODIATRY | Facility: CLINIC | Age: 83
End: 2018-01-29
Payer: MEDICARE

## 2018-01-29 VITALS — HEIGHT: 60 IN | BODY MASS INDEX: 21 KG/M2 | WEIGHT: 106.94 LBS

## 2018-01-29 DIAGNOSIS — M20.61 TOE DEFORMITY, RIGHT: ICD-10-CM

## 2018-01-29 DIAGNOSIS — E11.21 TYPE 2 DIABETES MELLITUS WITH DIABETIC NEPHROPATHY, WITHOUT LONG-TERM CURRENT USE OF INSULIN: Primary | ICD-10-CM

## 2018-01-29 DIAGNOSIS — B35.1 ONYCHOMYCOSIS DUE TO DERMATOPHYTE: ICD-10-CM

## 2018-01-29 PROCEDURE — 11721 DEBRIDE NAIL 6 OR MORE: CPT | Mod: Q7,S$GLB,, | Performed by: PODIATRIST

## 2018-01-29 PROCEDURE — 99213 OFFICE O/P EST LOW 20 MIN: CPT | Mod: 25,S$GLB,, | Performed by: PODIATRIST

## 2018-01-29 PROCEDURE — 99999 PR PBB SHADOW E&M-EST. PATIENT-LVL III: CPT | Mod: PBBFAC,,, | Performed by: PODIATRIST

## 2018-01-29 RX ORDER — CICLOPIROX 80 MG/ML
SOLUTION TOPICAL NIGHTLY
Qty: 6.6 ML | Refills: 11 | Status: SHIPPED | OUTPATIENT
Start: 2018-01-29

## 2018-01-29 RX ORDER — NIFEDIPINE 60 MG/1
TABLET, EXTENDED RELEASE ORAL
Qty: 90 TABLET | Refills: 3 | Status: SHIPPED | OUTPATIENT
Start: 2018-01-29

## 2018-01-29 NOTE — PROGRESS NOTES
Subjective:      Patient ID: Jaqui Odell is a 89 y.o. female.    Chief Complaint: Nail Problem (left great toe )    Jaqui is a 89 y.o. female who presents to the clinic for evaluation and treatment of high risk feet. Jaqui has a past medical history of Callus; Carotid artery occlusion; Diabetes mellitus; Diabetes mellitus, type 2; Hyperlipidemia; Hypertension; Stroke; and Syncope and collapse. The patient's chief complaint is long, thick toenails.  Gradual onset, worsening over past several weeks, aggravated by increased weight bearing, shoe gear, pressure. Periodic debridement helps symptoms.  Diabetes, increased risk amputation needing evaluation/management/optomization of foot care.    This patient has documented high risk feet requiring routine maintenance secondary to diabetes mellitis and those secondary complications of diabetes, as mentioned..    PCP: Ruth Pink MD    Date Last Seen by PCP:   Chief Complaint   Patient presents with    Nail Problem     left great toe          Current shoe gear:  Affected Foot: Casual shoes     Unaffected Foot: Casual shoes    Hemoglobin A1C   Date Value Ref Range Status   01/10/2018 5.1 4.0 - 5.6 % Final     Comment:     According to ADA guidelines, hemoglobin A1c <7.0% represents  optimal control in non-pregnant diabetic patients. Different  metrics may apply to specific patient populations.   Standards of Medical Care in Diabetes-2016.  For the purpose of screening for the presence of diabetes:  <5.7%     Consistent with the absence of diabetes  5.7-6.4%  Consistent with increasing risk for diabetes   (prediabetes)  >or=6.5%  Consistent with diabetes  Currently, no consensus exists for use of hemoglobin A1c  for diagnosis of diabetes for children.  This Hemoglobin A1c assay has significant interference with fetal   hemoglobin   (HbF). The results are invalid for patients with abnormal amounts of   HbF,   including those with known Hereditary Persistence   of Fetal  Hemoglobin. Heterozygous hemoglobin variants (HbAS, HbAC,   HbAD, HbAE, HbA2) do not significantly interfere with this assay;   however, presence of multiple variants in a sample may impact the %   interference.     06/27/2017 5.4 4.0 - 5.6 % Final     Comment:     According to ADA guidelines, hemoglobin A1c <7.0% represents  optimal control in non-pregnant diabetic patients. Different  metrics may apply to specific patient populations.   Standards of Medical Care in Diabetes-2016.  For the purpose of screening for the presence of diabetes:  <5.7%     Consistent with the absence of diabetes  5.7-6.4%  Consistent with increasing risk for diabetes   (prediabetes)  >or=6.5%  Consistent with diabetes  Currently, no consensus exists for use of hemoglobin A1c  for diagnosis of diabetes for children.  This Hemoglobin A1c assay has significant interference with fetal   hemoglobin   (HbF). The results are invalid for patients with abnormal amounts of   HbF,   including those with known Hereditary Persistence   of Fetal Hemoglobin. Heterozygous hemoglobin variants (HbAS, HbAC,   HbAD, HbAE, HbA2) do not significantly interfere with this assay;   however, presence of multiple variants in a sample may impact the %   interference.     12/16/2016 4.4 (L) 4.5 - 6.2 % Final     Comment:     According to ADA guidelines, hemoglobin A1C <7.0% represents  optimal control in non-pregnant diabetic patients.  Different  metrics may apply to specific populations.   Standards of Medical Care in Diabetes - 2016.  For the purpose of screening for the presence of diabetes:  <5.7%     Consistent with the absence of diabetes  5.7-6.4%  Consistent with increasing risk for diabetes   (prediabetes)  >or=6.5%  Consistent with diabetes  Currently no consensus exists for use of hemoglobin A1C  for diagnosis of diabetes for children.         Review of Systems   Constitution: Negative for chills, diaphoresis, fever, malaise/fatigue and night sweats.    Cardiovascular: Negative for claudication, cyanosis, leg swelling and syncope.   Skin: Positive for nail changes. Negative for color change, dry skin, rash, suspicious lesions and unusual hair distribution.   Musculoskeletal: Negative for falls, joint pain, joint swelling, muscle cramps, muscle weakness and stiffness.   Gastrointestinal: Negative for constipation, diarrhea, nausea and vomiting.   Neurological: Positive for paresthesias and sensory change. Negative for brief paralysis, disturbances in coordination, focal weakness, numbness and tremors.           Objective:      Physical Exam   Constitutional: She is oriented to person, place, and time. She appears well-developed and well-nourished. She is cooperative.   Oriented to time, place, and person.   Cardiovascular:   Pulses:       Dorsalis pedis pulses are 1+ on the right side, and 1+ on the left side.        Posterior tibial pulses are 1+ on the right side, and 1+ on the left side.   Capillary fill time 3-5 seconds.  All toes warm to touch.      negative lower extremity edema bilateral.    Negative elevational pallor and dependent rubor bilateral.     Musculoskeletal:   Normal angle, base, station of gait. Decreased stride length, early heel off, moderately propulsive toe off bilateral.  Stands for transfers only.    All ten toes without clubbing, cyanosis, or signs of ischemia.      Right 5th toe varus deformity from prior surgery.  Left 5th toe hammertoe not reducible without symptom today.    No pain to palpation bilateral lower extremities.      Range of motion, stability, muscle strength, and muscle tone are age and health appropriate normal bilateral feet and legs.       Lymphadenopathy:   Negative lymphadenopathy bilateral popliteal fossa and tarsal tunnel.     Neurological: She is alert and oriented to person, place, and time. She has normal strength. She is not disoriented. She displays no atrophy and no tremor. A sensory deficit is present. She  exhibits normal muscle tone.   Reflex Scores:       Patellar reflexes are 2+ on the right side and 2+ on the left side.       Achilles reflexes are 2+ on the right side and 2+ on the left side.  Decreased/absent vibratory sensation bilateral feet to 128Hz tuning fork.    Paresthesias, and burning bilateral feet with no clearly identified trigger or source.     Skin: Skin is warm, dry and intact. No abrasion, no bruising, no burn, no ecchymosis, no laceration, no lesion, no petechiae and no rash noted. She is not diaphoretic. No cyanosis or erythema. No pallor. Nails show no clubbing.   Skin thin, atrophic, with decreased density and distribution of pedal hair bilateral, but without hyperpigmentation, remington discoloration,  ulcers, masses, nodules or cords palpated bilateral feet and legs.      Toenails 1st, 2nd, 3rd, 4th, 5th  bilateral are hypertrophic thickened 2-3 mm, dystrophic, discolored tanish brown with tan, gray crumbly subungual debris.  Tender to distal nail plate pressure, without periungual skin abnormality of each.               Assessment:       Encounter Diagnoses   Name Primary?    Type 2 diabetes mellitus with diabetic nephropathy, without long-term current use of insulin Yes    Onychomycosis due to dermatophyte     Toe deformity, right          Plan:       Jaqui was seen today for nail problem.    Diagnoses and all orders for this visit:    Type 2 diabetes mellitus with diabetic nephropathy, without long-term current use of insulin    Onychomycosis due to dermatophyte    Toe deformity, right    Other orders  -     ciclopirox (PENLAC) 8 % Soln; Apply topically nightly.      I counseled the patient on her conditions, their implications and medical management.        The patient has received literature on basic diabetic foot care.  Patient will inspect feet daily, wear protective shoe gear when ambulatory, and apply moisturizer to skin as needed to maintain elasticity and help prevent  ulceration.    Declines penlac, diabetic shoes, heat molded inserts.  Declines diabetic shoes.  Discussed and patient verbally acknowledges the increased risk of ulceration, infection, amputaiton, sepsis, and death.    With the patient's permission, I debrided all ten toenails with a sterile nipper and curette, removing all offending nail and debris.  Patient tolerated the procedure well and related significant relief.                Follow-up in about 1 year (around 1/29/2019) for Ar exam.

## 2018-01-29 NOTE — LETTER
January 29, 2018      Brittnee Shay MD  [address] [place] [address]           [facility]  [address] [place] [address]  Phone: [phone]          Patient: Jaqui Odell   MR Number: [id number]   YOB: 1924   Date of Visit: 1/29/2018       Dear Dr. Brittnee Shay:    Thank you for referring Jaqui Odell to me for evaluation. Attached you will find relevant portions of my assessment and plan of care.    If you have questions, please do not hesitate to call me. I look forward to following Jaqui Odell along with you.    Sincerely,    Zeferino [place], ANTONI    Enclosure  CC:  No Recipients    If you would like to receive this communication electronically, please contact [email or web address] or [phone] to request more information on [facility] access.    For providers and/or their staff who would like to refer a patient to [facility], please contact us through our one-stop-shop provider referral line, [facility] , at [phone].    If you feel you have received this communication in error or would no longer like to receive these types of communications, please e-mail [email or web address]

## 2018-02-22 ENCOUNTER — LAB VISIT (OUTPATIENT)
Dept: LAB | Facility: HOSPITAL | Age: 83
End: 2018-02-22
Attending: INTERNAL MEDICINE
Payer: MEDICARE

## 2018-02-22 DIAGNOSIS — Z86.39 HISTORY OF IRON DEFICIENCY: ICD-10-CM

## 2018-02-22 DIAGNOSIS — D64.9 ANEMIA, UNSPECIFIED TYPE: ICD-10-CM

## 2018-02-22 LAB
BASOPHILS # BLD AUTO: 0 K/UL
BASOPHILS NFR BLD: 0.7 %
DIFFERENTIAL METHOD: ABNORMAL
EOSINOPHIL # BLD AUTO: 0.2 K/UL
EOSINOPHIL NFR BLD: 6.2 %
ERYTHROCYTE [DISTWIDTH] IN BLOOD BY AUTOMATED COUNT: 13.4 %
FERRITIN SERPL-MCNC: 807 NG/ML
HCT VFR BLD AUTO: 29 %
HGB BLD-MCNC: 9.5 G/DL
LYMPHOCYTES # BLD AUTO: 1.7 K/UL
LYMPHOCYTES NFR BLD: 44.2 %
MCH RBC QN AUTO: 32.7 PG
MCHC RBC AUTO-ENTMCNC: 32.9 G/DL
MCV RBC AUTO: 99 FL
MONOCYTES # BLD AUTO: 0.3 K/UL
MONOCYTES NFR BLD: 6.7 %
NEUTROPHILS # BLD AUTO: 1.6 K/UL
NEUTROPHILS NFR BLD: 42.2 %
PLATELET # BLD AUTO: 122 K/UL
PMV BLD AUTO: 10.3 FL
RBC # BLD AUTO: 2.92 M/UL
WBC # BLD AUTO: 3.9 K/UL

## 2018-02-22 PROCEDURE — 36415 COLL VENOUS BLD VENIPUNCTURE: CPT

## 2018-02-22 PROCEDURE — 85025 COMPLETE CBC W/AUTO DIFF WBC: CPT

## 2018-02-22 PROCEDURE — 82728 ASSAY OF FERRITIN: CPT

## 2018-02-23 ENCOUNTER — OFFICE VISIT (OUTPATIENT)
Dept: HEMATOLOGY/ONCOLOGY | Facility: CLINIC | Age: 83
End: 2018-02-23
Payer: MEDICARE

## 2018-02-23 VITALS — HEIGHT: 60 IN

## 2018-02-23 DIAGNOSIS — N18.30 STAGE 3 CHRONIC KIDNEY DISEASE: ICD-10-CM

## 2018-02-23 DIAGNOSIS — E88.09 HYPOALBUMINEMIA: ICD-10-CM

## 2018-02-23 DIAGNOSIS — Z79.899 ERYTHROPOIETIN (EPO) STIMULATING AGENT ANEMIA MANAGEMENT PATIENT: Primary | ICD-10-CM

## 2018-02-23 DIAGNOSIS — D64.9 ERYTHROPOIETIN (EPO) STIMULATING AGENT ANEMIA MANAGEMENT PATIENT: Primary | ICD-10-CM

## 2018-02-23 DIAGNOSIS — D69.6 THROMBOCYTOPENIA: ICD-10-CM

## 2018-02-23 DIAGNOSIS — N18.30 ANEMIA IN STAGE 3 CHRONIC KIDNEY DISEASE: ICD-10-CM

## 2018-02-23 DIAGNOSIS — D89.0 POLYCLONAL GAMMOPATHY DETERMINED BY SERUM PROTEIN ELECTROPHORESIS: ICD-10-CM

## 2018-02-23 DIAGNOSIS — Z86.73 CHRONIC ARTERIAL ISCHEMIC STROKE: ICD-10-CM

## 2018-02-23 DIAGNOSIS — D63.1 ANEMIA IN STAGE 3 CHRONIC KIDNEY DISEASE: ICD-10-CM

## 2018-02-23 PROCEDURE — 1159F MED LIST DOCD IN RCRD: CPT | Mod: S$GLB,,, | Performed by: INTERNAL MEDICINE

## 2018-02-23 PROCEDURE — 99999 PR PBB SHADOW E&M-EST. PATIENT-LVL II: CPT | Mod: PBBFAC,,, | Performed by: INTERNAL MEDICINE

## 2018-02-23 PROCEDURE — 3008F BODY MASS INDEX DOCD: CPT | Mod: S$GLB,,, | Performed by: INTERNAL MEDICINE

## 2018-02-23 PROCEDURE — 99215 OFFICE O/P EST HI 40 MIN: CPT | Mod: S$GLB,,, | Performed by: INTERNAL MEDICINE

## 2018-02-23 NOTE — PROGRESS NOTES
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  Pt's daughter is worried that she may have an ear infection   Pt here for hyperferritinemia and anemia renal disease and suspected myeloma  Cannot use exjade because calculated GFR is too low    SHe does have a history of stroke and carotid artery stenosis.   Daughter states she is sleeping more than 50 % of the day, not eating  She is tolerating meds lisinopril and Procardia for HTN   Tolerated procrit in the past        Current Outpatient Prescriptions:     aspirin (ECOTRIN) 81 MG EC tablet, Take 81 mg by mouth once daily., Disp: , Rfl:     cholecalciferol, vitamin D3, (VITAMIN D3) 2,000 unit Cap, Take 1 capsule by mouth once daily., Disp: , Rfl:     ciclopirox (PENLAC) 8 % Soln, Apply topically nightly., Disp: 6.6 mL, Rfl: 11    cloNIDine (CATAPRES) 0.1 MG tablet, Take 1 tablet (0.1 mg total) by mouth every morning., Disp: 90 tablet, Rfl: 3    clopidogrel (PLAVIX) 75 mg tablet, Take 1 tablet (75 mg total) by mouth once daily., Disp: 90 tablet, Rfl: 3    cyanocobalamin (VITAMIN B-12) 1000 MCG tablet, Take 1,000 mcg by mouth once daily. , Disp: , Rfl:     docusate sodium (COLACE) 100 MG capsule, Take 100 mg by mouth every evening., Disp: , Rfl:     ferrous sulfate 325 mg (65 mg iron) Tab tablet, TAKE ONE TABLET BY MOUTH ONCE DAILY, Disp: 90 tablet, Rfl: 0    furosemide (LASIX) 40 MG tablet, Take 1 tablet (40 mg total) by mouth every Mon, Wed, Fri., Disp: , Rfl:     lisinopril (PRINIVIL,ZESTRIL) 40 MG tablet, Take 1 tablet (40 mg total) by mouth once daily., Disp: 90 tablet, Rfl: 3    lisinopril (PRINIVIL,ZESTRIL) 40 MG tablet, TAKE ONE TABLET BY MOUTH ONCE DAILY, Disp: 30 tablet, Rfl: 0    NIFEdipine (PROCARDIA-XL) 60 MG (OSM) 24 hr tablet, TAKE ONE TABLET BY MOUTH ONCE DAILY, Disp: 90 tablet, Rfl: 3     potassium chloride SA (KLOR-CON M20) 20 MEQ tablet, Take 1 tablet (20 mEq total) by mouth every Mon, Wed, Fri., Disp: 36 tablet, Rfl: 1    pravastatin (PRAVACHOL) 40 MG tablet, Take 1 tablet (40 mg total) by mouth every evening., Disp: 90 tablet, Rfl: 3  No current facility-administered medications for this visit.     Facility-Administered Medications Ordered in Other Visits:     epoetin yamila injection 20,000 Units, 20,000 Units, Subcutaneous, 1 time in Clinic/HOD, Brittnee Shay MD    REVIEW OF SYSTEMS:  GENERAL:  No fever or chills.  Weight stable increasing fatigue  HEENT:  No photophobia, rhinorrhea, sinus congestion, tinnitus, gingival   bleeding, oral ulcers, or sore throat.  RESPIRATORY:  No shortness of breath, cough, or wheezing.  GASTROINTESTINAL:  No abdominal pain, dysphagia, emesis, diarrhea, or   constipation.  No heartburn, abdominal distention, melena, or hematochezia.  GENITOURINARY:  No urinary frequency, hesitancy, dysuria, or hematuria.  MUSCULOSKELETAL:  No neck pain, back pain  NEUROLOGICAL:  No headaches,   Localized paresthesias  PSYCH:  No agitation, change in behavior, anxiety, or depression.      PHYSICAL EXAMINATION:  Ht 5' (1.524 m)   LMP  (LMP Unknown)   VS noted  GENERAL:  Thin body habitus cachectic.  HEENT:  Temporal wasting. Ears impacted with wax bilaterally  PSYCH:  Pleasant affect today. No pain  LUNGS:  Clear, no use of accessory muscles during respiration.  CARDIAC:  S1, S2. Bradycardia no murmur  ABDOMEN:  Nondistended. Soft , positive paresthesias  EXTREMITIES:  Thin, no cyanosis,positive  edema.  SKIN:  Warm, dry. POSITIVE tenting  Edema stable at 1 + Bilateral     LABS:      Lab Results   Component Value Date    WBC 3.90 02/22/2018    HGB 9.5 (L) 02/22/2018    HCT 29.0 (L) 02/22/2018    MCV 99 (H) 02/22/2018     (L) 02/22/2018       CMP  Sodium   Date Value Ref Range Status   11/13/2017 141 136 - 145 mmol/L Final     Potassium   Date Value Ref Range Status    11/13/2017 3.8 3.5 - 5.1 mmol/L Final     Chloride   Date Value Ref Range Status   11/13/2017 112 (H) 95 - 110 mmol/L Final     CO2   Date Value Ref Range Status   11/13/2017 18 (L) 23 - 29 mmol/L Final     Glucose   Date Value Ref Range Status   11/13/2017 116 (H) 70 - 110 mg/dL Final     BUN, Bld   Date Value Ref Range Status   11/13/2017 27 10 - 30 mg/dL Final     Creatinine   Date Value Ref Range Status   11/13/2017 1.0 0.5 - 1.4 mg/dL Final     Calcium   Date Value Ref Range Status   11/13/2017 9.3 8.7 - 10.5 mg/dL Final     Total Protein   Date Value Ref Range Status   11/13/2017 7.3 6.0 - 8.4 g/dL Final     Albumin   Date Value Ref Range Status   11/13/2017 3.0 (L) 3.5 - 5.2 g/dL Final     Total Bilirubin   Date Value Ref Range Status   11/13/2017 0.5 0.1 - 1.0 mg/dL Final     Comment:     For infants and newborns, interpretation of results should be based  on gestational age, weight and in agreement with clinical  observations.  Premature Infant recommended reference ranges:  Up to 24 hours.............<8.0 mg/dL  Up to 48 hours............<12.0 mg/dL  3-5 days..................<15.0 mg/dL  6-29 days.................<15.0 mg/dL       Alkaline Phosphatase   Date Value Ref Range Status   11/13/2017 83 55 - 135 U/L Final     AST   Date Value Ref Range Status   11/13/2017 25 10 - 40 U/L Final     ALT   Date Value Ref Range Status   11/13/2017 13 10 - 44 U/L Final     Anion Gap   Date Value Ref Range Status   11/13/2017 11 8 - 16 mmol/L Final     eGFR if    Date Value Ref Range Status   11/13/2017 57 (A) >60 mL/min/1.73 m^2 Final     eGFR if non    Date Value Ref Range Status   11/13/2017 49 (A) >60 mL/min/1.73 m^2 Final     Comment:     Calculation used to obtain the estimated glomerular filtration  rate (eGFR) is the CKD-EPI equation.       ferritin level was 20/1/40 and is decreased to 1191  Iron is 60 as it was 5 months ago in saturation of 25 as it was 24 5 months ago  Lab  Results   Component Value Date    FERRITIN 807 (H) 02/22/2018       Erythropoietin (EPO) stimulating agent anemia management patient  Cleared for procrit  Anemia in stage 3 chronic kidney disease  -     CBC auto differential; Future; Expected date: 02/23/2018  -     Iron and TIBC; Future; Expected date: 02/23/2018  -     FERRITIN; Future; Expected date: 02/23/2018    Chronic arterial ischemic stroke  Benefit of procrit outweighs the risk  Stage 3 chronic kidney disease  Stable   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  asymptomatic  Thrombocytopenia  Improving , avoid nsaids   Hypoalbuminemia  Increase protein intake and this will help with LE edema      CLEARED for procrit weekly times 2   RTC 3 weeks with cbc and diff and ferritin level along with iron studies     Ferritin stabilizing : continues to improve   Explained possibility of a myelomatous process: obs for now  Pt does not want aggressive therapy even for presumed pancreatic cancer  Pt prefers no intervention , declines the need for imaging studies  NO FURTHER IV IRON at the moment but must follow counts  Cont HTN meds    RTC 3 weeks with cbc and diff , iron and ferritin    Careful not to Iron overload her

## 2018-03-07 ENCOUNTER — LAB VISIT (OUTPATIENT)
Dept: LAB | Facility: HOSPITAL | Age: 83
End: 2018-03-07
Attending: INTERNAL MEDICINE
Payer: MEDICARE

## 2018-03-07 DIAGNOSIS — D63.1 ANEMIA IN STAGE 3 CHRONIC KIDNEY DISEASE: ICD-10-CM

## 2018-03-07 DIAGNOSIS — N18.30 ANEMIA IN STAGE 3 CHRONIC KIDNEY DISEASE: ICD-10-CM

## 2018-03-07 LAB
ANISOCYTOSIS BLD QL SMEAR: SLIGHT
BASOPHILS # BLD AUTO: ABNORMAL K/UL
BASOPHILS NFR BLD: 0 %
DIFFERENTIAL METHOD: ABNORMAL
EOSINOPHIL # BLD AUTO: ABNORMAL K/UL
EOSINOPHIL NFR BLD: 6 %
ERYTHROCYTE [DISTWIDTH] IN BLOOD BY AUTOMATED COUNT: 14.6 %
FERRITIN SERPL-MCNC: 564 NG/ML
HCT VFR BLD AUTO: 30.5 %
HGB BLD-MCNC: 10.1 G/DL
IRON SERPL-MCNC: 38 UG/DL
LYMPHOCYTES # BLD AUTO: ABNORMAL K/UL
LYMPHOCYTES NFR BLD: 42 %
MCH RBC QN AUTO: 33.8 PG
MCHC RBC AUTO-ENTMCNC: 33.3 G/DL
MCV RBC AUTO: 102 FL
MONOCYTES # BLD AUTO: ABNORMAL K/UL
MONOCYTES NFR BLD: 10 %
NEUTROPHILS NFR BLD: 42 %
PLATELET # BLD AUTO: 154 K/UL
PLATELET BLD QL SMEAR: ABNORMAL
PMV BLD AUTO: 10.4 FL
POLYCHROMASIA BLD QL SMEAR: ABNORMAL
RBC # BLD AUTO: 3 M/UL
SATURATED IRON: 14 %
TOTAL IRON BINDING CAPACITY: 266 UG/DL
TRANSFERRIN SERPL-MCNC: 180 MG/DL
WBC # BLD AUTO: 5.3 K/UL

## 2018-03-07 PROCEDURE — 85027 COMPLETE CBC AUTOMATED: CPT

## 2018-03-07 PROCEDURE — 36415 COLL VENOUS BLD VENIPUNCTURE: CPT

## 2018-03-07 PROCEDURE — 83540 ASSAY OF IRON: CPT

## 2018-03-07 PROCEDURE — 85007 BL SMEAR W/DIFF WBC COUNT: CPT

## 2018-03-07 PROCEDURE — 82728 ASSAY OF FERRITIN: CPT

## 2018-03-15 ENCOUNTER — LAB VISIT (OUTPATIENT)
Dept: LAB | Facility: HOSPITAL | Age: 83
End: 2018-03-15
Attending: INTERNAL MEDICINE
Payer: MEDICARE

## 2018-03-15 DIAGNOSIS — D50.0 ANEMIA DUE TO CHRONIC BLOOD LOSS: Primary | ICD-10-CM

## 2018-03-15 DIAGNOSIS — D50.0 ANEMIA DUE TO CHRONIC BLOOD LOSS: ICD-10-CM

## 2018-03-15 LAB
BASOPHILS # BLD AUTO: 0 K/UL
BASOPHILS NFR BLD: 1.2 %
DIFFERENTIAL METHOD: ABNORMAL
EOSINOPHIL # BLD AUTO: 0.2 K/UL
EOSINOPHIL NFR BLD: 5.3 %
ERYTHROCYTE [DISTWIDTH] IN BLOOD BY AUTOMATED COUNT: 16.8 %
FERRITIN SERPL-MCNC: 465 NG/ML
HCT VFR BLD AUTO: 39.2 %
HGB BLD-MCNC: 12.6 G/DL
IRON SERPL-MCNC: 68 UG/DL
LYMPHOCYTES # BLD AUTO: 1.7 K/UL
LYMPHOCYTES NFR BLD: 42.6 %
MCH RBC QN AUTO: 33.3 PG
MCHC RBC AUTO-ENTMCNC: 32.1 G/DL
MCV RBC AUTO: 104 FL
MONOCYTES # BLD AUTO: 0.3 K/UL
MONOCYTES NFR BLD: 7.9 %
NEUTROPHILS # BLD AUTO: 1.7 K/UL
NEUTROPHILS NFR BLD: 43 %
PLATELET # BLD AUTO: 156 K/UL
PMV BLD AUTO: 10.4 FL
RBC # BLD AUTO: 3.79 M/UL
SATURATED IRON: 23 %
TOTAL IRON BINDING CAPACITY: 302 UG/DL
TRANSFERRIN SERPL-MCNC: 204 MG/DL
WBC # BLD AUTO: 4 K/UL

## 2018-03-15 PROCEDURE — 83540 ASSAY OF IRON: CPT

## 2018-03-15 PROCEDURE — 82728 ASSAY OF FERRITIN: CPT

## 2018-03-15 PROCEDURE — 36415 COLL VENOUS BLD VENIPUNCTURE: CPT

## 2018-03-15 PROCEDURE — 85025 COMPLETE CBC W/AUTO DIFF WBC: CPT

## 2018-03-16 ENCOUNTER — OFFICE VISIT (OUTPATIENT)
Dept: HEMATOLOGY/ONCOLOGY | Facility: CLINIC | Age: 83
End: 2018-03-16
Payer: MEDICARE

## 2018-03-16 VITALS
BODY MASS INDEX: 21.03 KG/M2 | HEART RATE: 61 BPM | HEIGHT: 60 IN | TEMPERATURE: 98 F | DIASTOLIC BLOOD PRESSURE: 84 MMHG | WEIGHT: 107.13 LBS | SYSTOLIC BLOOD PRESSURE: 204 MMHG | RESPIRATION RATE: 16 BRPM

## 2018-03-16 DIAGNOSIS — E11.59 HYPERTENSION ASSOCIATED WITH DIABETES: ICD-10-CM

## 2018-03-16 DIAGNOSIS — I15.2 HYPERTENSION ASSOCIATED WITH DIABETES: ICD-10-CM

## 2018-03-16 DIAGNOSIS — D89.0 POLYCLONAL GAMMOPATHY DETERMINED BY SERUM PROTEIN ELECTROPHORESIS: ICD-10-CM

## 2018-03-16 DIAGNOSIS — Z86.2 HISTORY OF IRON DEFICIENCY ANEMIA: Primary | ICD-10-CM

## 2018-03-16 PROCEDURE — 99999 PR PBB SHADOW E&M-EST. PATIENT-LVL IV: CPT | Mod: PBBFAC,,, | Performed by: INTERNAL MEDICINE

## 2018-03-16 PROCEDURE — 99214 OFFICE O/P EST MOD 30 MIN: CPT | Mod: S$GLB,,, | Performed by: INTERNAL MEDICINE

## 2018-03-16 NOTE — PROGRESS NOTES
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  Pt's daughter is worried that she may have an ear infection   Pt here for hyperferritinemia and anemia renal disease and suspected myeloma  Cannot use exjade because calculated GFR is too low    SHe does have a history of stroke and carotid artery stenosis.   Today pt is talkative   She is tolerating meds lisinopril and Procardia for HTN : her pressure is always high in clinic   She has no headache, no chest pain, no change in vision  Tolerated procrit in the past yet   Here to asess response to treatment    Current Outpatient Prescriptions:     aspirin (ECOTRIN) 81 MG EC tablet, Take 81 mg by mouth once daily., Disp: , Rfl:     cholecalciferol, vitamin D3, (VITAMIN D3) 2,000 unit Cap, Take 1 capsule by mouth once daily., Disp: , Rfl:     ciclopirox (PENLAC) 8 % Soln, Apply topically nightly., Disp: 6.6 mL, Rfl: 11    cloNIDine (CATAPRES) 0.1 MG tablet, Take 1 tablet (0.1 mg total) by mouth every morning., Disp: 90 tablet, Rfl: 3    clopidogrel (PLAVIX) 75 mg tablet, Take 1 tablet (75 mg total) by mouth once daily., Disp: 90 tablet, Rfl: 3    cyanocobalamin (VITAMIN B-12) 1000 MCG tablet, Take 1,000 mcg by mouth once daily. , Disp: , Rfl:     docusate sodium (COLACE) 100 MG capsule, Take 100 mg by mouth every evening., Disp: , Rfl:     ferrous sulfate 325 mg (65 mg iron) Tab tablet, TAKE ONE TABLET BY MOUTH ONCE DAILY, Disp: 90 tablet, Rfl: 0    furosemide (LASIX) 40 MG tablet, Take 1 tablet (40 mg total) by mouth every Mon, Wed, Fri., Disp: , Rfl:     lisinopril (PRINIVIL,ZESTRIL) 40 MG tablet, Take 1 tablet (40 mg total) by mouth once daily., Disp: 90 tablet, Rfl: 3    NIFEdipine (PROCARDIA-XL) 60 MG (OSM) 24 hr tablet, TAKE ONE TABLET BY MOUTH ONCE DAILY, Disp: 90 tablet, Rfl: 3    potassium chloride SA  (KLOR-CON M20) 20 MEQ tablet, Take 1 tablet (20 mEq total) by mouth every Mon, Wed, Fri., Disp: 36 tablet, Rfl: 1    pravastatin (PRAVACHOL) 40 MG tablet, Take 1 tablet (40 mg total) by mouth every evening., Disp: 90 tablet, Rfl: 3  No current facility-administered medications for this visit.     Facility-Administered Medications Ordered in Other Visits:     epoetin yamila injection 20,000 Units, 20,000 Units, Subcutaneous, 1 time in Clinic/HOD, Brittnee Shay MD    REVIEW OF SYSTEMS:  GENERAL:  No fever or chills.  Weight stable increasing fatigue  HEENT:  No photophobia, rhinorrhea, sinus congestion, tinnitus, gingival   bleeding, oral ulcers, or sore throat.  RESPIRATORY:  No shortness of breath, cough, or wheezing.  GASTROINTESTINAL:  No abdominal pain, dysphagia, emesis, diarrhea, or   constipation.  No heartburn, abdominal distention, melena, or hematochezia.  GENITOURINARY:  No urinary frequency, hesitancy, dysuria, or hematuria.  MUSCULOSKELETAL:  No neck pain, back pain  NEUROLOGICAL:  No headaches,   Localized paresthesias  PSYCH:  No agitation, change in behavior, anxiety, or depression.      PHYSICAL EXAMINATION:  BP (!) 204/84 Comment: pt did not take medication this am due to waking up late  Pulse 61   Temp 98.2 °F (36.8 °C) (Oral)   Resp 16   Ht 5' (1.524 m)   Wt 48.6 kg (107 lb 2.3 oz)   LMP  (LMP Unknown)   BMI 20.93 kg/m²   VS noted  GENERAL:  Thin body habitus cachectic.  HEENT:  Temporal wasting. Ears impacted with wax bilaterally  PSYCH:  Pleasant affect today. No pain  LUNGS:  Clear, no use of accessory muscles during respiration.  CARDIAC:  S1, S2. Bradycardia no murmur  ABDOMEN:  Nondistended. Soft , positive paresthesias  EXTREMITIES:  Thin, no cyanosis,positive  edema.  SKIN:  Warm, dry. NOtenting  Edema stable at 1 + Bilateral     LABS:      Lab Results   Component Value Date    WBC 4.00 03/15/2018    HGB 12.6 03/15/2018    HCT 39.2 03/15/2018     (H) 03/15/2018      03/15/2018       CMP  Sodium   Date Value Ref Range Status   11/13/2017 141 136 - 145 mmol/L Final     Potassium   Date Value Ref Range Status   11/13/2017 3.8 3.5 - 5.1 mmol/L Final     Chloride   Date Value Ref Range Status   11/13/2017 112 (H) 95 - 110 mmol/L Final     CO2   Date Value Ref Range Status   11/13/2017 18 (L) 23 - 29 mmol/L Final     Glucose   Date Value Ref Range Status   11/13/2017 116 (H) 70 - 110 mg/dL Final     BUN, Bld   Date Value Ref Range Status   11/13/2017 27 10 - 30 mg/dL Final     Creatinine   Date Value Ref Range Status   11/13/2017 1.0 0.5 - 1.4 mg/dL Final     Calcium   Date Value Ref Range Status   11/13/2017 9.3 8.7 - 10.5 mg/dL Final     Total Protein   Date Value Ref Range Status   11/13/2017 7.3 6.0 - 8.4 g/dL Final     Albumin   Date Value Ref Range Status   11/13/2017 3.0 (L) 3.5 - 5.2 g/dL Final     Total Bilirubin   Date Value Ref Range Status   11/13/2017 0.5 0.1 - 1.0 mg/dL Final     Comment:     For infants and newborns, interpretation of results should be based  on gestational age, weight and in agreement with clinical  observations.  Premature Infant recommended reference ranges:  Up to 24 hours.............<8.0 mg/dL  Up to 48 hours............<12.0 mg/dL  3-5 days..................<15.0 mg/dL  6-29 days.................<15.0 mg/dL       Alkaline Phosphatase   Date Value Ref Range Status   11/13/2017 83 55 - 135 U/L Final     AST   Date Value Ref Range Status   11/13/2017 25 10 - 40 U/L Final     ALT   Date Value Ref Range Status   11/13/2017 13 10 - 44 U/L Final     Anion Gap   Date Value Ref Range Status   11/13/2017 11 8 - 16 mmol/L Final     eGFR if    Date Value Ref Range Status   11/13/2017 57 (A) >60 mL/min/1.73 m^2 Final     eGFR if non    Date Value Ref Range Status   11/13/2017 49 (A) >60 mL/min/1.73 m^2 Final     Comment:     Calculation used to obtain the estimated glomerular filtration  rate (eGFR) is the CKD-EPI equation.        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

## 2018-03-19 DIAGNOSIS — D50.8 IRON DEFICIENCY ANEMIA SECONDARY TO INADEQUATE DIETARY IRON INTAKE: ICD-10-CM

## 2018-03-19 RX ORDER — MULTIVITAMIN WITH MINERALS
TABLET ORAL
Qty: 90 TABLET | Refills: 0 | Status: SHIPPED | OUTPATIENT
Start: 2018-03-19 | End: 2018-08-31 | Stop reason: SDUPTHER

## 2018-04-03 NOTE — MR AVS SNAPSHOT
[facility]  [address] [place] [address] Drive [address]  [place] [address]  Phone: [phone]                  Jaqui Odell   2017 1:40 PM   Office Visit    Description:  Female : 1924   Provider:  Brittnee Shay MD   Department:  [facility]           Reason for Visit     Anemia           Diagnoses this Visit        Comments    Iron deficiency anemia secondary to inadequate dietary iron intake                To Do List           Future Appointments        Provider Department Dept Phone    2017 10:00 AM VASCULAR, CARDIOLOGY [facility] V[facility]iology [phone]    2017 11:40 AM MD Nitesh Barrios [facility]-Interventional Card [phone]    2017 1:20 PM Ruth Pink MD [facility] [phone]      Goals (5 Years of Data)     None       These Medications        Disp Refills Start End    ferrous sulfate 325 mg (65 mg iron) Tab tablet 90 tablet 0 2017     Take 1 tablet (325 mg total) by mouth once daily. - Oral    Pharmacy: [facility] [address] [place] [id number] [facility] Ph #: [phone]         [facility] On Call     [facility] On Call Nurse Care Line -  Assistance  Registered nurses in the [facility] provide clinical advisement, health education, appointment booking, and other advisory services.  Call for this free service at [phone].             Medications           Message regarding Medications     Verify the changes and/or additions to your medication regime listed below are the same as discussed with your clinician today.  If any of these changes or additions are incorrect, please notify your healthcare provider.             Verify that the below list of medications is an accurate representation of the medications you are currently taking.  If none reported, the list may be blank. If incorrect, please contact your healthcare provider. Carry this list with you in case of  emergency.           Current Medications     aspirin (ECOTRIN) 81 MG EC tablet Take 81 mg by mouth once daily.    blood sugar diagnostic Strp True Result glucose strips check once daily    blood-glucose meter Misc True Results glucometer    cloNIDine (CATAPRES) 0.1 MG tablet Take 1 tablet (0.1 mg total) by mouth every morning.    clopidogrel (PLAVIX) 75 mg tablet Take 1 tablet (75 mg total) by mouth once daily.    cyanocobalamin (VITAMIN B-12) 1000 MCG tablet Take 100 mcg by mouth once daily.    ferrous sulfate 325 mg (65 mg iron) Tab tablet Take 1 tablet (325 mg total) by mouth once daily.    fludrocortisone (FLORINEF) 0.1 mg Tab 1/2 tab po every third day    furosemide (LASIX) 40 MG tablet TAKE ONE TABLET BY MOUTH EVERY MORNING    hydrOXYzine (ATARAX) 25 MG tablet Take 25 mg by mouth 2 (two) times daily as needed for Itching.    KLOR-CON M20 20 mEq tablet TAKE ONE TABLET BY MOUTH TWICE DAILY    lancets Misc True Result lancets check once daily    lisinopril (PRINIVIL,ZESTRIL) 40 MG tablet Take 1 tablet (40 mg total) by mouth once daily.    nifedipine (PROCARDIA-XL) 60 MG (OSM) 24 hr tablet TAKE ONE TABLET BY MOUTH ONCE DAILY    pravastatin (PRAVACHOL) 40 MG tablet Take 1 tablet (40 mg total) by mouth every evening.    vitamin D 1000 units Tab Take 185 mg by mouth once daily.    vits A,C,E-lutein-zeax-zn-emanuel 9,650 unit-195 mg-95 unit Cap Take 1 tablet by mouth 2 (two) times daily with meals.           Clinical Reference Information           Your Vitals Were     BP Pulse Temp Resp Height Last Period    107/50 51 97.7 °F (36.5 °C) 14 5' (1.524 m) (LMP Unknown)      Blood Pressure          Most Recent Value    BP  (!)  107/50      Allergies as of 2/22/2017     No Known Drug Allergies      Immunizations Administered on Date of Encounter - 2/22/2017     None      Language Assistance Services     ATTENTION: Language assistance services are available, free of charge. Please call [phone].      ATENCIÓN: Manuel allen  español, tiene a cates disposición servicios gratuitos de asistencia lingüística. Katherine al [phone].     LYSSA Ý: N?u b?n nói Ti?ng Vi?t, có các d?ch v? h? tr? ngôn ng? mi?n phí dành cho b?n. G?i s? [phone].         [facility] complies with applicable Federal civil rights laws and does not discriminate on the basis of race, color, national origin, age, disability, or sex.         Patient/Caregiver provided printed discharge information.

## 2018-04-16 NOTE — PROGRESS NOTES
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  Pt's daughter is worried that she may have an ear infection   Pt here for hyperferritinemia and anemia renal disease and suspected myeloma  Cannot use exjade because calculated GFR is too low    SHe does have a history of stroke and carotid artery stenosis.   Today pt is talkative   She is tolerating meds lisinopril and Procardia for HTN  She has no headache, no chest pain, no change in vision  Tolerated procrit in the past yet   Here to asess response to treatment    Current Outpatient Prescriptions:     aspirin (ECOTRIN) 81 MG EC tablet, Take 81 mg by mouth once daily., Disp: , Rfl:     cholecalciferol, vitamin D3, (VITAMIN D3) 2,000 unit Cap, Take 1 capsule by mouth once daily., Disp: , Rfl:     ciclopirox (PENLAC) 8 % Soln, Apply topically nightly., Disp: 6.6 mL, Rfl: 11    cloNIDine (CATAPRES) 0.1 MG tablet, Take 1 tablet (0.1 mg total) by mouth every morning., Disp: 90 tablet, Rfl: 3    clopidogrel (PLAVIX) 75 mg tablet, Take 1 tablet (75 mg total) by mouth once daily., Disp: 90 tablet, Rfl: 3    cyanocobalamin (VITAMIN B-12) 1000 MCG tablet, Take 1,000 mcg by mouth once daily. , Disp: , Rfl:     docusate sodium (COLACE) 100 MG capsule, Take 100 mg by mouth every evening., Disp: , Rfl:     furosemide (LASIX) 40 MG tablet, Take 1 tablet (40 mg total) by mouth every Mon, Wed, Fri., Disp: , Rfl:     IRON 325 mg (65 mg iron) Tab tablet, TAKE ONE TABLET BY MOUTH ONCE DAILY, Disp: 90 tablet, Rfl: 0    lisinopril (PRINIVIL,ZESTRIL) 40 MG tablet, Take 1 tablet (40 mg total) by mouth once daily., Disp: 90 tablet, Rfl: 3    NIFEdipine (PROCARDIA-XL) 60 MG (OSM) 24 hr tablet, TAKE ONE TABLET BY MOUTH ONCE DAILY, Disp: 90 tablet, Rfl: 3    potassium chloride SA (KLOR-CON M20) 20 MEQ tablet, Take 1 tablet (20 mEq total)  by mouth every Mon, Wed, Fri., Disp: 36 tablet, Rfl: 1    pravastatin (PRAVACHOL) 40 MG tablet, Take 1 tablet (40 mg total) by mouth every evening., Disp: 90 tablet, Rfl: 3  No current facility-administered medications for this visit.     Facility-Administered Medications Ordered in Other Visits:     epoetin yamila injection 20,000 Units, 20,000 Units, Subcutaneous, 1 time in Clinic/HOD, Brittnee Shay MD    REVIEW OF SYSTEMS:  GENERAL:  No fever or chills.  Weight stable continued increasing fatigue  HEENT:  No photophobia, rhinorrhea, sinus congestion, tinnitus, gingival   bleeding, oral ulcers, or sore throat.  RESPIRATORY:  No shortness of breath, cough, or wheezing.  GASTROINTESTINAL:  No abdominal pain, dysphagia, emesis, diarrhea, or   constipation.  No heartburn, abdominal distention, melena, or hematochezia.  GENITOURINARY:  No urinary frequency, hesitancy, dysuria, or hematuria.  MUSCULOSKELETAL:  No neck pain, back pain  NEUROLOGICAL:  No headaches,   Localized paresthesias in a wheelchair  PSYCH:  No agitation, change in behavior, anxiety, or depression.      PHYSICAL EXAMINATION:  BP (!) 155/70 (BP Location: Right arm, Patient Position: Sitting, BP Method: Medium (Automatic))   Pulse (!) 54   Temp 98.5 °F (36.9 °C) (Oral)   Resp 20   Ht 5' (1.524 m)   Wt 48.5 kg (107 lb)   LMP  (LMP Unknown)   BMI 20.90 kg/m²   LABS:      Lab Results   Component Value Date    WBC 4.60 04/18/2018    HGB 9.9 (L) 04/18/2018    HCT 29.2 (L) 04/18/2018    MCV 99 (H) 04/18/2018     04/18/2018     CMP  Sodium   Date Value Ref Range Status   11/13/2017 141 136 - 145 mmol/L Final     Potassium   Date Value Ref Range Status   11/13/2017 3.8 3.5 - 5.1 mmol/L Final     Chloride   Date Value Ref Range Status   11/13/2017 112 (H) 95 - 110 mmol/L Final     CO2   Date Value Ref Range Status   11/13/2017 18 (L) 23 - 29 mmol/L Final     Glucose   Date Value Ref Range Status   11/13/2017 116 (H) 70 - 110 mg/dL Final      BUN, Bld   Date Value Ref Range Status   11/13/2017 27 10 - 30 mg/dL Final     Creatinine   Date Value Ref Range Status   11/13/2017 1.0 0.5 - 1.4 mg/dL Final     Calcium   Date Value Ref Range Status   11/13/2017 9.3 8.7 - 10.5 mg/dL Final     Total Protein   Date Value Ref Range Status   11/13/2017 7.3 6.0 - 8.4 g/dL Final     Albumin   Date Value Ref Range Status   11/13/2017 3.0 (L) 3.5 - 5.2 g/dL Final     Total Bilirubin   Date Value Ref Range Status   11/13/2017 0.5 0.1 - 1.0 mg/dL Final     Comment:     For infants and newborns, interpretation of results should be based  on gestational age, weight and in agreement with clinical  observations.  Premature Infant recommended reference ranges:  Up to 24 hours.............<8.0 mg/dL  Up to 48 hours............<12.0 mg/dL  3-5 days..................<15.0 mg/dL  6-29 days.................<15.0 mg/dL       Alkaline Phosphatase   Date Value Ref Range Status   11/13/2017 83 55 - 135 U/L Final     AST   Date Value Ref Range Status   11/13/2017 25 10 - 40 U/L Final     ALT   Date Value Ref Range Status   11/13/2017 13 10 - 44 U/L Final     Anion Gap   Date Value Ref Range Status   11/13/2017 11 8 - 16 mmol/L Final     eGFR if    Date Value Ref Range Status   11/13/2017 57 (A) >60 mL/min/1.73 m^2 Final     eGFR if non    Date Value Ref Range Status   11/13/2017 49 (A) >60 mL/min/1.73 m^2 Final     Comment:     Calculation used to obtain the estimated glomerular filtration  rate (eGFR) is the CKD-EPI equation.       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  -     epoetin yamila (PROCRIT) injection 20,000 Units; Inject 1 mL (20,000 Units total) into the skin one time.      Cleared for procrit times two   Iron stores are good   Counts have significantly decreased  Cont HTN meds: monitored by PCP  Ferritin elevated after iron : stable     RTC 3 weeks with cbc and diff , iron and ferritin    Careful not to Iron overload her   Lengthy discussion as to her diet and activity she could tolerate.  She is to remain on calcium and vitamin D for osteoporosis  Patient refuses any further intervention for such at this point in time  She also has a pancreatic mass which she does not want any further workup of such either

## 2018-04-18 ENCOUNTER — LAB VISIT (OUTPATIENT)
Dept: LAB | Facility: HOSPITAL | Age: 83
End: 2018-04-18
Attending: INTERNAL MEDICINE
Payer: MEDICARE

## 2018-04-18 DIAGNOSIS — Z86.2 HISTORY OF IRON DEFICIENCY ANEMIA: ICD-10-CM

## 2018-04-18 LAB
BASOPHILS # BLD AUTO: 0 K/UL
BASOPHILS NFR BLD: 1 %
DIFFERENTIAL METHOD: ABNORMAL
EOSINOPHIL # BLD AUTO: 0.2 K/UL
EOSINOPHIL NFR BLD: 4.4 %
ERYTHROCYTE [DISTWIDTH] IN BLOOD BY AUTOMATED COUNT: 13.9 %
HCT VFR BLD AUTO: 29.2 %
HGB BLD-MCNC: 9.9 G/DL
IRON SERPL-MCNC: 55 UG/DL
LYMPHOCYTES # BLD AUTO: 2.2 K/UL
LYMPHOCYTES NFR BLD: 48.4 %
MCH RBC QN AUTO: 33.6 PG
MCHC RBC AUTO-ENTMCNC: 33.9 G/DL
MCV RBC AUTO: 99 FL
MONOCYTES # BLD AUTO: 0.3 K/UL
MONOCYTES NFR BLD: 7.6 %
NEUTROPHILS # BLD AUTO: 1.8 K/UL
NEUTROPHILS NFR BLD: 38.6 %
PLATELET # BLD AUTO: 158 K/UL
PMV BLD AUTO: 10.3 FL
RBC # BLD AUTO: 2.95 M/UL
SATURATED IRON: 23 %
TOTAL IRON BINDING CAPACITY: 243 UG/DL
TRANSFERRIN SERPL-MCNC: 164 MG/DL
WBC # BLD AUTO: 4.6 K/UL

## 2018-04-18 PROCEDURE — 36415 COLL VENOUS BLD VENIPUNCTURE: CPT

## 2018-04-18 PROCEDURE — 85025 COMPLETE CBC W/AUTO DIFF WBC: CPT

## 2018-04-18 PROCEDURE — 83540 ASSAY OF IRON: CPT

## 2018-04-20 ENCOUNTER — OFFICE VISIT (OUTPATIENT)
Dept: HEMATOLOGY/ONCOLOGY | Facility: CLINIC | Age: 83
End: 2018-04-20
Payer: MEDICARE

## 2018-04-20 VITALS
SYSTOLIC BLOOD PRESSURE: 155 MMHG | HEART RATE: 54 BPM | WEIGHT: 107 LBS | TEMPERATURE: 99 F | HEIGHT: 60 IN | BODY MASS INDEX: 21.01 KG/M2 | DIASTOLIC BLOOD PRESSURE: 70 MMHG | RESPIRATION RATE: 20 BRPM

## 2018-04-20 DIAGNOSIS — N18.30 STAGE 3 CHRONIC KIDNEY DISEASE: Primary | ICD-10-CM

## 2018-04-20 DIAGNOSIS — M81.8 OTHER OSTEOPOROSIS WITHOUT CURRENT PATHOLOGICAL FRACTURE: ICD-10-CM

## 2018-04-20 DIAGNOSIS — E11.21 TYPE 2 DIABETES MELLITUS WITH DIABETIC NEPHROPATHY, WITHOUT LONG-TERM CURRENT USE OF INSULIN: ICD-10-CM

## 2018-04-20 DIAGNOSIS — N18.9 ANEMIA OF RENAL DISEASE: ICD-10-CM

## 2018-04-20 DIAGNOSIS — D63.1 ANEMIA OF RENAL DISEASE: ICD-10-CM

## 2018-04-20 DIAGNOSIS — R63.6 UNDERWEIGHT: ICD-10-CM

## 2018-04-20 DIAGNOSIS — D69.6 THROMBOCYTOPENIA: ICD-10-CM

## 2018-04-20 DIAGNOSIS — D89.0 POLYCLONAL GAMMOPATHY DETERMINED BY SERUM PROTEIN ELECTROPHORESIS: ICD-10-CM

## 2018-04-20 PROCEDURE — 99499 UNLISTED E&M SERVICE: CPT | Mod: S$GLB,,, | Performed by: INTERNAL MEDICINE

## 2018-04-20 PROCEDURE — 99999 PR PBB SHADOW E&M-EST. PATIENT-LVL IV: CPT | Mod: PBBFAC,,, | Performed by: INTERNAL MEDICINE

## 2018-04-20 PROCEDURE — 99215 OFFICE O/P EST HI 40 MIN: CPT | Mod: S$GLB,,, | Performed by: INTERNAL MEDICINE

## 2018-04-23 DIAGNOSIS — E87.6 HYPOPOTASSEMIA: ICD-10-CM

## 2018-04-23 RX ORDER — POTASSIUM CHLORIDE 1500 MG/1
TABLET, EXTENDED RELEASE ORAL
Qty: 36 TABLET | Refills: 1 | Status: SHIPPED | OUTPATIENT
Start: 2018-04-23

## 2018-05-02 ENCOUNTER — LAB VISIT (OUTPATIENT)
Dept: LAB | Facility: HOSPITAL | Age: 83
End: 2018-05-02
Attending: INTERNAL MEDICINE
Payer: MEDICARE

## 2018-05-02 DIAGNOSIS — B35.1 ONYCHOMYCOSIS: ICD-10-CM

## 2018-05-02 DIAGNOSIS — C90.00 MULTIPLE MYELOMA, REMISSION STATUS UNSPECIFIED: ICD-10-CM

## 2018-05-02 LAB
BASOPHILS # BLD AUTO: 0 K/UL
BASOPHILS NFR BLD: 0.9 %
DIFFERENTIAL METHOD: ABNORMAL
EOSINOPHIL # BLD AUTO: 0.2 K/UL
EOSINOPHIL NFR BLD: 6.7 %
ERYTHROCYTE [DISTWIDTH] IN BLOOD BY AUTOMATED COUNT: 14.7 %
HCT VFR BLD AUTO: 25.4 %
HGB BLD-MCNC: 8.5 G/DL
LYMPHOCYTES # BLD AUTO: 1.5 K/UL
LYMPHOCYTES NFR BLD: 41.7 %
MCH RBC QN AUTO: 33.5 PG
MCHC RBC AUTO-ENTMCNC: 33.6 G/DL
MCV RBC AUTO: 100 FL
MONOCYTES # BLD AUTO: 0.2 K/UL
MONOCYTES NFR BLD: 5.2 %
NEUTROPHILS # BLD AUTO: 1.7 K/UL
NEUTROPHILS NFR BLD: 45.5 %
PLATELET # BLD AUTO: 123 K/UL
PMV BLD AUTO: 10.7 FL
RBC # BLD AUTO: 2.55 M/UL
WBC # BLD AUTO: 3.7 K/UL

## 2018-05-02 PROCEDURE — 85025 COMPLETE CBC W/AUTO DIFF WBC: CPT

## 2018-05-02 PROCEDURE — 36415 COLL VENOUS BLD VENIPUNCTURE: CPT

## 2018-05-03 DIAGNOSIS — D64.9 ANEMIA, UNSPECIFIED TYPE: Primary | ICD-10-CM

## 2018-05-10 ENCOUNTER — LAB VISIT (OUTPATIENT)
Dept: LAB | Facility: HOSPITAL | Age: 83
End: 2018-05-10
Attending: INTERNAL MEDICINE
Payer: MEDICARE

## 2018-05-10 DIAGNOSIS — B35.1 ONYCHOMYCOSIS: ICD-10-CM

## 2018-05-10 DIAGNOSIS — D64.9 ANEMIA, UNSPECIFIED TYPE: ICD-10-CM

## 2018-05-10 DIAGNOSIS — C90.00 MULTIPLE MYELOMA, REMISSION STATUS UNSPECIFIED: ICD-10-CM

## 2018-05-10 LAB
ALBUMIN SERPL BCP-MCNC: 2.8 G/DL
ALP SERPL-CCNC: 77 U/L
ALT SERPL W/O P-5'-P-CCNC: 10 U/L
ANION GAP SERPL CALC-SCNC: 9 MMOL/L
AST SERPL-CCNC: 20 U/L
BASOPHILS # BLD AUTO: 0 K/UL
BASOPHILS NFR BLD: 0.5 %
BILIRUB SERPL-MCNC: 0.6 MG/DL
BUN SERPL-MCNC: 42 MG/DL
CALCIUM SERPL-MCNC: 8.8 MG/DL
CHLORIDE SERPL-SCNC: 113 MMOL/L
CO2 SERPL-SCNC: 21 MMOL/L
CREAT SERPL-MCNC: 1.4 MG/DL
DIFFERENTIAL METHOD: ABNORMAL
EOSINOPHIL # BLD AUTO: 0.2 K/UL
EOSINOPHIL NFR BLD: 3.4 %
ERYTHROCYTE [DISTWIDTH] IN BLOOD BY AUTOMATED COUNT: 15.5 %
EST. GFR  (AFRICAN AMERICAN): 37 ML/MIN/1.73 M^2
EST. GFR  (NON AFRICAN AMERICAN): 32 ML/MIN/1.73 M^2
GLUCOSE SERPL-MCNC: 220 MG/DL
HCT VFR BLD AUTO: 28.2 %
HGB BLD-MCNC: 9.1 G/DL
LYMPHOCYTES # BLD AUTO: 1.3 K/UL
LYMPHOCYTES NFR BLD: 28 %
MCH RBC QN AUTO: 32.9 PG
MCHC RBC AUTO-ENTMCNC: 32.4 G/DL
MCV RBC AUTO: 102 FL
MONOCYTES # BLD AUTO: 0.4 K/UL
MONOCYTES NFR BLD: 8.1 %
NEUTROPHILS # BLD AUTO: 2.8 K/UL
NEUTROPHILS NFR BLD: 60 %
PLATELET # BLD AUTO: 134 K/UL
PMV BLD AUTO: 10.1 FL
POTASSIUM SERPL-SCNC: 3.9 MMOL/L
PROT SERPL-MCNC: 6.4 G/DL
RBC # BLD AUTO: 2.78 M/UL
SODIUM SERPL-SCNC: 143 MMOL/L
WBC # BLD AUTO: 4.6 K/UL

## 2018-05-10 PROCEDURE — 36415 COLL VENOUS BLD VENIPUNCTURE: CPT

## 2018-05-10 PROCEDURE — 80053 COMPREHEN METABOLIC PANEL: CPT

## 2018-05-10 PROCEDURE — 85025 COMPLETE CBC W/AUTO DIFF WBC: CPT

## 2018-05-17 ENCOUNTER — LAB VISIT (OUTPATIENT)
Dept: LAB | Facility: HOSPITAL | Age: 83
End: 2018-05-17
Attending: INTERNAL MEDICINE
Payer: MEDICARE

## 2018-05-17 DIAGNOSIS — D64.9 ANEMIA, UNSPECIFIED TYPE: ICD-10-CM

## 2018-05-17 LAB
ALBUMIN SERPL BCP-MCNC: 2.9 G/DL
ALP SERPL-CCNC: 76 U/L
ALT SERPL W/O P-5'-P-CCNC: 11 U/L
ANION GAP SERPL CALC-SCNC: 9 MMOL/L
AST SERPL-CCNC: 20 U/L
BASOPHILS # BLD AUTO: 0 K/UL
BASOPHILS NFR BLD: 0.6 %
BILIRUB SERPL-MCNC: 0.4 MG/DL
BUN SERPL-MCNC: 24 MG/DL
CALCIUM SERPL-MCNC: 9.2 MG/DL
CHLORIDE SERPL-SCNC: 113 MMOL/L
CO2 SERPL-SCNC: 20 MMOL/L
CREAT SERPL-MCNC: 0.9 MG/DL
DIFFERENTIAL METHOD: ABNORMAL
EOSINOPHIL # BLD AUTO: 0.2 K/UL
EOSINOPHIL NFR BLD: 5.6 %
ERYTHROCYTE [DISTWIDTH] IN BLOOD BY AUTOMATED COUNT: 16.4 %
EST. GFR  (AFRICAN AMERICAN): >60 ML/MIN/1.73 M^2
EST. GFR  (NON AFRICAN AMERICAN): 55 ML/MIN/1.73 M^2
GLUCOSE SERPL-MCNC: 202 MG/DL
HCT VFR BLD AUTO: 31.9 %
HGB BLD-MCNC: 10.4 G/DL
LYMPHOCYTES # BLD AUTO: 1.5 K/UL
LYMPHOCYTES NFR BLD: 36.7 %
MCH RBC QN AUTO: 33.1 PG
MCHC RBC AUTO-ENTMCNC: 32.6 G/DL
MCV RBC AUTO: 102 FL
MONOCYTES # BLD AUTO: 0.2 K/UL
MONOCYTES NFR BLD: 5.5 %
NEUTROPHILS # BLD AUTO: 2.2 K/UL
NEUTROPHILS NFR BLD: 51.6 %
PLATELET # BLD AUTO: 162 K/UL
PMV BLD AUTO: 9.9 FL
POTASSIUM SERPL-SCNC: 3.8 MMOL/L
PROT SERPL-MCNC: 6.8 G/DL
RBC # BLD AUTO: 3.14 M/UL
SODIUM SERPL-SCNC: 142 MMOL/L
WBC # BLD AUTO: 4.2 K/UL

## 2018-05-17 PROCEDURE — 36415 COLL VENOUS BLD VENIPUNCTURE: CPT

## 2018-05-17 PROCEDURE — 85025 COMPLETE CBC W/AUTO DIFF WBC: CPT

## 2018-05-17 PROCEDURE — 80053 COMPREHEN METABOLIC PANEL: CPT

## 2018-05-18 ENCOUNTER — OFFICE VISIT (OUTPATIENT)
Dept: HEMATOLOGY/ONCOLOGY | Facility: CLINIC | Age: 83
End: 2018-05-18
Payer: MEDICARE

## 2018-05-18 VITALS
DIASTOLIC BLOOD PRESSURE: 65 MMHG | HEIGHT: 60 IN | TEMPERATURE: 98 F | SYSTOLIC BLOOD PRESSURE: 143 MMHG | HEART RATE: 51 BPM | RESPIRATION RATE: 20 BRPM

## 2018-05-18 DIAGNOSIS — N18.30 ANEMIA IN STAGE 3 CHRONIC KIDNEY DISEASE: Primary | ICD-10-CM

## 2018-05-18 DIAGNOSIS — D63.1 ANEMIA IN STAGE 3 CHRONIC KIDNEY DISEASE: Primary | ICD-10-CM

## 2018-05-18 PROCEDURE — 99213 OFFICE O/P EST LOW 20 MIN: CPT | Mod: S$GLB,,, | Performed by: INTERNAL MEDICINE

## 2018-05-18 PROCEDURE — 99999 PR PBB SHADOW E&M-EST. PATIENT-LVL III: CPT | Mod: PBBFAC,,, | Performed by: INTERNAL MEDICINE

## 2018-05-18 NOTE — PROGRESS NOTES
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  Pt's daughter is worried that she may have an ear infection   Pt here for hyperferritinemia and anemia renal disease and suspected myeloma  Cannot use exjade because calculated GFR is too low    She received Procrit 5-3 and 5-10  Here for re evaluation of anemia and the need for further procrit or iron  Has a pancreatic mass but desires no further workup or intervention regarding such   SHe does have a history of stroke and carotid artery stenosis.   She is tolerating meds lisinopril and Procardia for HTN      Current Outpatient Prescriptions:     aspirin (ECOTRIN) 81 MG EC tablet, Take 81 mg by mouth once daily., Disp: , Rfl:     cholecalciferol, vitamin D3, (VITAMIN D3) 2,000 unit Cap, Take 1 capsule by mouth once daily., Disp: , Rfl:     ciclopirox (PENLAC) 8 % Soln, Apply topically nightly., Disp: 6.6 mL, Rfl: 11    cloNIDine (CATAPRES) 0.1 MG tablet, Take 1 tablet (0.1 mg total) by mouth every morning., Disp: 90 tablet, Rfl: 3    clopidogrel (PLAVIX) 75 mg tablet, Take 1 tablet (75 mg total) by mouth once daily., Disp: 90 tablet, Rfl: 3    cyanocobalamin (VITAMIN B-12) 1000 MCG tablet, Take 1,000 mcg by mouth once daily. , Disp: , Rfl:     docusate sodium (COLACE) 100 MG capsule, Take 100 mg by mouth every evening., Disp: , Rfl:     furosemide (LASIX) 40 MG tablet, Take 1 tablet (40 mg total) by mouth every Mon, Wed, Fri., Disp: , Rfl:     IRON 325 mg (65 mg iron) Tab tablet, TAKE ONE TABLET BY MOUTH ONCE DAILY, Disp: 90 tablet, Rfl: 0    KLOR-CON M20 20 mEq tablet, TAKE 1 TABLET BY MOUTH EVERY MONDAY, WEDNESDAY, AND FRIDAY, Disp: 36 tablet, Rfl: 1    lisinopril (PRINIVIL,ZESTRIL) 40 MG tablet, Take 1 tablet (40 mg total) by mouth once daily., Disp: 90 tablet, Rfl: 3    NIFEdipine (PROCARDIA-XL) 60 MG (OSM) 24  hr tablet, TAKE ONE TABLET BY MOUTH ONCE DAILY, Disp: 90 tablet, Rfl: 3    pravastatin (PRAVACHOL) 40 MG tablet, Take 1 tablet (40 mg total) by mouth every evening., Disp: 90 tablet, Rfl: 3  No current facility-administered medications for this visit.     Facility-Administered Medications Ordered in Other Visits:     epoetin yamila injection 20,000 Units, 20,000 Units, Subcutaneous, 1 time in Clinic/HOD, Brittnee Shay MD    REVIEW OF SYSTEMS:  GENERAL:  No fever or chills.  Weight stable continued increasing fatigue  HEENT:  No photophobia, rhinorrhea, sinus congestion, tinnitus, gingival   bleeding, oral ulcers, or sore throat.  RESPIRATORY:  No shortness of breath, cough, or wheezing.  GASTROINTESTINAL:  No abdominal pain, dysphagia, emesis, diarrhea, or   constipation.  No heartburn, abdominal distention, melena, or hematochezia.  GENITOURINARY:  No urinary frequency, hesitancy, dysuria, or hematuria.  MUSCULOSKELETAL:  No neck pain, back pain  NEUROLOGICAL:  No headaches,   Localized paresthesias in a wheelchair  PSYCH:  No agitation, change in behavior, anxiety, or depression.      PHYSICAL EXAMINATION:  BP (!) 143/65 (BP Location: Right arm, Patient Position: Sitting, BP Method: Medium (Automatic))   Pulse (!) 51   Temp 97.8 °F (36.6 °C) (Oral)   Resp 20   Ht 5' (1.524 m)   LMP  (LMP Unknown)     Gen: NAD, A and O x3  HEENT: NCAT,  OP clear  Neck: suppple, no JVD  Lungs: CTAB  CV: S1S2 with RRR  Abd: soft, NTND, + BS  Extr: No CCE, PAMELA       Lab Results   Component Value Date    WBC 4.20 05/17/2018    HGB 10.4 (L) 05/17/2018    HCT 31.9 (L) 05/17/2018     (H) 05/17/2018     05/17/2018     CMP  Sodium   Date Value Ref Range Status   05/17/2018 142 136 - 145 mmol/L Final     Potassium   Date Value Ref Range Status   05/17/2018 3.8 3.5 - 5.1 mmol/L Final     Chloride   Date Value Ref Range Status   05/17/2018 113 (H) 95 - 110 mmol/L Final     CO2   Date Value Ref Range Status   05/17/2018 20  (L) 23 - 29 mmol/L Final     Glucose   Date Value Ref Range Status   05/17/2018 202 (H) 70 - 110 mg/dL Final     BUN, Bld   Date Value Ref Range Status   05/17/2018 24 10 - 30 mg/dL Final     Creatinine   Date Value Ref Range Status   05/17/2018 0.9 0.5 - 1.4 mg/dL Final     Calcium   Date Value Ref Range Status   05/17/2018 9.2 8.7 - 10.5 mg/dL Final     Total Protein   Date Value Ref Range Status   05/17/2018 6.8 6.0 - 8.4 g/dL Final     Albumin   Date Value Ref Range Status   05/17/2018 2.9 (L) 3.5 - 5.2 g/dL Final     Total Bilirubin   Date Value Ref Range Status   05/17/2018 0.4 0.1 - 1.0 mg/dL Final     Comment:     For infants and newborns, interpretation of results should be based  on gestational age, weight and in agreement with clinical  observations.  Premature Infant recommended reference ranges:  Up to 24 hours.............<8.0 mg/dL  Up to 48 hours............<12.0 mg/dL  3-5 days..................<15.0 mg/dL  6-29 days.................<15.0 mg/dL       Alkaline Phosphatase   Date Value Ref Range Status   05/17/2018 76 55 - 135 U/L Final     AST   Date Value Ref Range Status   05/17/2018 20 10 - 40 U/L Final     ALT   Date Value Ref Range Status   05/17/2018 11 10 - 44 U/L Final     Anion Gap   Date Value Ref Range Status   05/17/2018 9 8 - 16 mmol/L Final     eGFR if    Date Value Ref Range Status   05/17/2018 >60 >60 mL/min/1.73 m^2 Final     eGFR if non    Date Value Ref Range Status   05/17/2018 55 (A) >60 mL/min/1.73 m^2 Final     Comment:     Calculation used to obtain the estimated glomerular filtration  rate (eGFR) is the CKD-EPI equation.       ferritin level was 20/1/40 and is decreased to 1191  Iron is 60 as it was 5 months ago in saturation of 25 as it was 24 5 months ago  Lab Results   Component Value Date    FERRITIN 465 (H) 03/15/2018       Anemia in stage 3 chronic kidney disease  -     CBC auto differential; Future; Expected date: 05/18/2018  -      Ferritin; Future; Expected date: 05/18/2018  -     Iron and TIBC; Future; Expected date: 05/18/2018          Hold procrit    Cont oral iron   Cont HTN meds: monitored by PCP      RTC 3 weeks with cbc and diff , iron and ferritin      Lengthy discussion as to her diet and activity she could tolerate.  She is to remain on calcium and vitamin D for osteoporosis  Patient refuses any further intervention for such at this point in time  She also has a pancreatic mass which she does not want any further workup of such either

## 2018-05-22 ENCOUNTER — CLINICAL SUPPORT (OUTPATIENT)
Dept: CARDIOLOGY | Facility: CLINIC | Age: 83
End: 2018-05-22
Attending: INTERNAL MEDICINE
Payer: MEDICARE

## 2018-05-22 ENCOUNTER — OFFICE VISIT (OUTPATIENT)
Dept: CARDIOLOGY | Facility: CLINIC | Age: 83
End: 2018-05-22
Payer: MEDICARE

## 2018-05-22 VITALS
BODY MASS INDEX: 20.43 KG/M2 | SYSTOLIC BLOOD PRESSURE: 130 MMHG | OXYGEN SATURATION: 100 % | WEIGHT: 104.06 LBS | HEART RATE: 48 BPM | HEIGHT: 60 IN | DIASTOLIC BLOOD PRESSURE: 63 MMHG

## 2018-05-22 DIAGNOSIS — I15.2 HYPERTENSION ASSOCIATED WITH DIABETES: ICD-10-CM

## 2018-05-22 DIAGNOSIS — I65.23 BILATERAL CAROTID ARTERY STENOSIS: ICD-10-CM

## 2018-05-22 DIAGNOSIS — I65.23 BILATERAL CAROTID ARTERY STENOSIS: Primary | ICD-10-CM

## 2018-05-22 DIAGNOSIS — Z86.73 HISTORY OF STROKE: ICD-10-CM

## 2018-05-22 DIAGNOSIS — E11.59 HYPERTENSION ASSOCIATED WITH DIABETES: ICD-10-CM

## 2018-05-22 DIAGNOSIS — E78.5 HYPERLIPIDEMIA ASSOCIATED WITH TYPE 2 DIABETES MELLITUS: ICD-10-CM

## 2018-05-22 DIAGNOSIS — E11.69 HYPERLIPIDEMIA ASSOCIATED WITH TYPE 2 DIABETES MELLITUS: ICD-10-CM

## 2018-05-22 LAB — INTERNAL CAROTID STENOSIS: NORMAL

## 2018-05-22 PROCEDURE — 99215 OFFICE O/P EST HI 40 MIN: CPT | Mod: S$GLB,,, | Performed by: INTERNAL MEDICINE

## 2018-05-22 PROCEDURE — 99499 UNLISTED E&M SERVICE: CPT | Mod: S$GLB,,, | Performed by: INTERNAL MEDICINE

## 2018-05-22 PROCEDURE — 99999 PR PBB SHADOW E&M-EST. PATIENT-LVL III: CPT | Mod: PBBFAC,,, | Performed by: INTERNAL MEDICINE

## 2018-05-22 PROCEDURE — 93880 EXTRACRANIAL BILAT STUDY: CPT | Mod: S$GLB,,, | Performed by: INTERNAL MEDICINE

## 2018-05-22 NOTE — PROGRESS NOTES
Interventional Cardiology Clinic Note  Reason for Visit: Carotid Artery Disease, 1 year follow-up    HPI:   Ms. Odell is a 89 y/o lady w/ PMHx HTN, HLD, DM2, CVA, carotid artery stenosis s/p L ICA stent in 2015 as a part of CREST 2 registry. She states that she has been doing well since last year with no complaints. She has no SOB, CP, dizziness or lightheadedness. Denies CVA/TIA/visual disturbances since last clinic visit. Ambulates with a walker at home, able to do all ADL's.    Carotid U/S today shows patent L ICA stent, no significant stenosis B/L.    ROS:    Constitution: Negative for fever, chills, weight loss or gain.   HENT: Negative for sore throat, rhinorrhea, or headache.  Eyes: Negative for blurred or double vision.   Cardiovascular: See above  Pulmonary: Negative for SOB   Gastrointestinal: Negative for abdominal pain, nausea, vomiting, or diarrhea.   : Negative for dysuria.   Neurological: Negative for focal weakness or sensory changes.  PMH:     Past Medical History:   Diagnosis Date    Callus     Carotid artery occlusion     Diabetes mellitus     Diabetes mellitus, type 2     Hyperlipidemia     Hypertension     Stroke     Syncope and collapse      Past Surgical History:   Procedure Laterality Date    adrenal insufficiency      CARDIAC CATHETERIZATION      CORONARY ANGIOPLASTY      HYSTERECTOMY       Allergies:     Review of patient's allergies indicates:   Allergen Reactions    No known drug allergies      Medications:     Current Outpatient Prescriptions on File Prior to Visit   Medication Sig Dispense Refill    aspirin (ECOTRIN) 81 MG EC tablet Take 81 mg by mouth once daily.      cholecalciferol, vitamin D3, (VITAMIN D3) 2,000 unit Cap Take 1 capsule by mouth once daily.      ciclopirox (PENLAC) 8 % Soln Apply topically nightly. 6.6 mL 11    cloNIDine (CATAPRES) 0.1 MG tablet Take 1 tablet (0.1 mg total) by mouth every morning. 90 tablet 3    clopidogrel (PLAVIX) 75 mg  tablet Take 1 tablet (75 mg total) by mouth once daily. 90 tablet 3    cyanocobalamin (VITAMIN B-12) 1000 MCG tablet Take 1,000 mcg by mouth once daily.       docusate sodium (COLACE) 100 MG capsule Take 100 mg by mouth every evening.      furosemide (LASIX) 40 MG tablet Take 1 tablet (40 mg total) by mouth every Mon, Wed, Fri.      IRON 325 mg (65 mg iron) Tab tablet TAKE ONE TABLET BY MOUTH ONCE DAILY 90 tablet 0    KLOR-CON M20 20 mEq tablet TAKE 1 TABLET BY MOUTH EVERY MONDAY, WEDNESDAY, AND FRIDAY 36 tablet 1    lisinopril (PRINIVIL,ZESTRIL) 40 MG tablet Take 1 tablet (40 mg total) by mouth once daily. 90 tablet 3    NIFEdipine (PROCARDIA-XL) 60 MG (OSM) 24 hr tablet TAKE ONE TABLET BY MOUTH ONCE DAILY 90 tablet 3    pravastatin (PRAVACHOL) 40 MG tablet Take 1 tablet (40 mg total) by mouth every evening. 90 tablet 3     Current Facility-Administered Medications on File Prior to Visit   Medication Dose Route Frequency Provider Last Rate Last Dose    epoetin yamila injection 20,000 Units  20,000 Units Subcutaneous 1 time in Clinic/HOD Brittnee Shay MD         Social History:     Social History   Substance Use Topics    Smoking status: Never Smoker    Smokeless tobacco: Never Used    Alcohol use No     Family History:   History reviewed. No pertinent family history.  Physical Exam:   /63 (BP Location: Right arm, Patient Position: Sitting, BP Method: Large (Automatic))   Pulse (!) 48   Ht 5' (1.524 m)   Wt 47.2 kg (104 lb 0.9 oz)   LMP  (LMP Unknown)   SpO2 100%   BMI 20.32 kg/m²      Constitutional: Vital signs are normal. She appears well-developed and well-nourished. She is active and cooperative.  Non-toxic appearance. No distress.   HENT:   Head: Normocephalic and atraumatic.   Mouth/Throat: Uvula is midline, oropharynx is clear and moist and mucous membranes are normal.   Eyes: Conjunctivae and lids are normal. Pupils are equal, round, and reactive to light.   Neck: Normal carotid  pulses and no JVD present. Carotid bruit is not present.   Cardiovascular: Normal rate, regular rhythm, S1 normal, S2 normal and normal heart sounds.  Exam reveals no gallop.    No murmur heard.  Pulses:       Carotid pulses are 2+ on the right side, and 2+ on the left side.       Radial pulses are 2+ on the right side, and 2+ on the left side.        Dorsalis pedis pulses are 1+ on the right side, and 1+ on the left side.        Posterior tibial pulses are 1+ on the right side, and 1+ on the left side.   Pulmonary/Chest: Effort normal and breath sounds normal. No accessory muscle usage. No respiratory distress. She has no decreased breath sounds. She has no wheezes. She has no rhonchi. She has no rales.   Abdominal: Soft. Bowel sounds are normal. She exhibits no distension and no mass. There is no tenderness.   Neurological: She is alert.   Skin: Skin is warm, dry and intact.     Labs:     Lab Results   Component Value Date     05/17/2018    K 3.8 05/17/2018     (H) 05/17/2018    CO2 20 (L) 05/17/2018    BUN 24 05/17/2018    CREATININE 0.9 05/17/2018    ANIONGAP 9 05/17/2018     Lab Results   Component Value Date    HGBA1C 5.1 01/10/2018     Lab Results   Component Value Date    BNP 99 09/25/2015    BNP 64 04/16/2015    BNP 64 04/16/2015    Lab Results   Component Value Date    WBC 4.20 05/17/2018    HGB 10.4 (L) 05/17/2018    HCT 31.9 (L) 05/17/2018     05/17/2018    GRAN 2.2 05/17/2018    GRAN 51.6 05/17/2018     Lab Results   Component Value Date    CHOL 154 01/10/2018    HDL 44 01/10/2018    LDLCALC 89.8 01/10/2018    TRIG 101 01/10/2018          Imaging:     EF   Date Value Ref Range Status   05/11/2015 60 55 - 65      Assessment and Plan:   Bilateral carotid artery stenosis  - s/p L ICA stent in 2015 as part of CREST 2 Registry  - Stent patent per Carotid U/S today, no significant stenosis B/L  - Asymptomatic, no CVA/TIA/visual symptoms  - Continue ASA, Plavix, Pravastatin  - May now follow  up with PCP as stent widely patent with no symptoms for the past 3 years    Hypertension associated with diabetes  - Well-controlled, continue anti-hypertensives    Hyperlipidemia associated with type 2 diabetes mellitus  - Continue pravastatin    Signed:  Miguel Franco MD  Cardiology Fellow, PGY-5  Pager: [phone]  5/22/2018 12:29 PM    Carotid duplex with no stent restenosis at 3 yrs.

## 2018-05-22 NOTE — ASSESSMENT & PLAN NOTE
- s/p L ICA stent in 2015 as part of CREST 2 Registry  - Stent patent per Carotid U/S today, no significant stenosis B/L  - Asymptomatic, no CVA/TIA/visual symptoms  - Continue ASA, Plavix, Pravastatin  - May now follow up with PCP as stent widely patent with no symptoms for the past 3 years

## 2018-05-30 ENCOUNTER — LAB VISIT (OUTPATIENT)
Dept: LAB | Facility: HOSPITAL | Age: 83
End: 2018-05-30
Attending: INTERNAL MEDICINE
Payer: MEDICARE

## 2018-05-30 DIAGNOSIS — N18.30 ANEMIA IN STAGE 3 CHRONIC KIDNEY DISEASE: ICD-10-CM

## 2018-05-30 DIAGNOSIS — D63.1 ANEMIA IN STAGE 3 CHRONIC KIDNEY DISEASE: ICD-10-CM

## 2018-05-30 LAB
BASOPHILS # BLD AUTO: 0 K/UL
BASOPHILS NFR BLD: 0.5 %
DIFFERENTIAL METHOD: ABNORMAL
EOSINOPHIL # BLD AUTO: 0.2 K/UL
EOSINOPHIL NFR BLD: 5.2 %
ERYTHROCYTE [DISTWIDTH] IN BLOOD BY AUTOMATED COUNT: 14 %
FERRITIN SERPL-MCNC: 355 NG/ML
HCT VFR BLD AUTO: 28.5 %
HGB BLD-MCNC: 9.4 G/DL
IRON SERPL-MCNC: 82 UG/DL
LYMPHOCYTES # BLD AUTO: 1.5 K/UL
LYMPHOCYTES NFR BLD: 40 %
MCH RBC QN AUTO: 32.8 PG
MCHC RBC AUTO-ENTMCNC: 32.9 G/DL
MCV RBC AUTO: 100 FL
MONOCYTES # BLD AUTO: 0.2 K/UL
MONOCYTES NFR BLD: 6.5 %
NEUTROPHILS # BLD AUTO: 1.8 K/UL
NEUTROPHILS NFR BLD: 47.8 %
PLATELET # BLD AUTO: 113 K/UL
PMV BLD AUTO: 12.9 FL
RBC # BLD AUTO: 2.86 M/UL
SATURATED IRON: 32 %
TOTAL IRON BINDING CAPACITY: 255 UG/DL
TRANSFERRIN SERPL-MCNC: 172 MG/DL
WBC # BLD AUTO: 3.7 K/UL

## 2018-05-30 PROCEDURE — 82728 ASSAY OF FERRITIN: CPT

## 2018-05-30 PROCEDURE — 85025 COMPLETE CBC W/AUTO DIFF WBC: CPT

## 2018-05-30 PROCEDURE — 83540 ASSAY OF IRON: CPT

## 2018-05-30 PROCEDURE — 36415 COLL VENOUS BLD VENIPUNCTURE: CPT

## 2018-05-31 ENCOUNTER — OFFICE VISIT (OUTPATIENT)
Dept: HEMATOLOGY/ONCOLOGY | Facility: CLINIC | Age: 83
End: 2018-05-31
Payer: MEDICARE

## 2018-05-31 VITALS
TEMPERATURE: 98 F | DIASTOLIC BLOOD PRESSURE: 65 MMHG | RESPIRATION RATE: 18 BRPM | WEIGHT: 104.94 LBS | SYSTOLIC BLOOD PRESSURE: 144 MMHG | BODY MASS INDEX: 20.6 KG/M2 | HEART RATE: 52 BPM | HEIGHT: 60 IN

## 2018-05-31 DIAGNOSIS — D89.0 POLYCLONAL GAMMOPATHY DETERMINED BY SERUM PROTEIN ELECTROPHORESIS: ICD-10-CM

## 2018-05-31 DIAGNOSIS — R63.4 WEIGHT LOSS: ICD-10-CM

## 2018-05-31 DIAGNOSIS — Z79.899 ERYTHROPOIETIN (EPO) STIMULATING AGENT ANEMIA MANAGEMENT PATIENT: Primary | ICD-10-CM

## 2018-05-31 DIAGNOSIS — G47.10 INCREASED SLEEPING: ICD-10-CM

## 2018-05-31 DIAGNOSIS — D64.9 ERYTHROPOIETIN (EPO) STIMULATING AGENT ANEMIA MANAGEMENT PATIENT: Primary | ICD-10-CM

## 2018-05-31 DIAGNOSIS — D61.818 PANCYTOPENIA: ICD-10-CM

## 2018-05-31 PROCEDURE — 99999 PR PBB SHADOW E&M-EST. PATIENT-LVL III: CPT | Mod: PBBFAC,,, | Performed by: INTERNAL MEDICINE

## 2018-05-31 PROCEDURE — 99499 UNLISTED E&M SERVICE: CPT | Mod: S$GLB,,, | Performed by: INTERNAL MEDICINE

## 2018-05-31 PROCEDURE — 99214 OFFICE O/P EST MOD 30 MIN: CPT | Mod: S$GLB,,, | Performed by: INTERNAL MEDICINE

## 2018-05-31 NOTE — PROGRESS NOTES
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  Pt's daughter is worried that she may have an ear infection   Pt here for hyperferritinemia and anemia renal disease and suspected myeloma  Cannot use exjade because calculated GFR is too low    Here for re evaluation of anemia and the need for further procrit or iron  Has a pancreatic mass but desires no further workup or intervention regarding such : last MRI ? Cyst in pancreatic tail   SHe does have a history of stroke and carotid artery stenosis.   She is tolerating meds lisinopril and Procardia for HTN      Current Outpatient Prescriptions:     aspirin (ECOTRIN) 81 MG EC tablet, Take 81 mg by mouth once daily., Disp: , Rfl:     C,E,zinc,copper 11-omega3s-lut (OCUVITE ADULT 50 PLUS) 250-5-1 mg Cap, Take 1 tablet by mouth once daily., Disp: , Rfl:     cholecalciferol, vitamin D3, (VITAMIN D3) 2,000 unit Cap, Take 1 capsule by mouth once daily., Disp: , Rfl:     ciclopirox (PENLAC) 8 % Soln, Apply topically nightly., Disp: 6.6 mL, Rfl: 11    cloNIDine (CATAPRES) 0.1 MG tablet, Take 1 tablet (0.1 mg total) by mouth every morning., Disp: 90 tablet, Rfl: 3    clopidogrel (PLAVIX) 75 mg tablet, Take 1 tablet (75 mg total) by mouth once daily., Disp: 90 tablet, Rfl: 3    cyanocobalamin (VITAMIN B-12) 1000 MCG tablet, Take 1,000 mcg by mouth once daily. , Disp: , Rfl:     docusate sodium (COLACE) 100 MG capsule, Take 100 mg by mouth every evening., Disp: , Rfl:     furosemide (LASIX) 40 MG tablet, Take 1 tablet (40 mg total) by mouth every Mon, Wed, Fri., Disp: , Rfl:     IRON 325 mg (65 mg iron) Tab tablet, TAKE ONE TABLET BY MOUTH ONCE DAILY, Disp: 90 tablet, Rfl: 0    KLOR-CON M20 20 mEq tablet, TAKE 1 TABLET BY MOUTH EVERY MONDAY, WEDNESDAY, AND FRIDAY, Disp: 36 tablet, Rfl: 1    lisinopril  (PRINIVIL,ZESTRIL) 40 MG tablet, Take 1 tablet (40 mg total) by mouth once daily., Disp: 90 tablet, Rfl: 3    NIFEdipine (PROCARDIA-XL) 60 MG (OSM) 24 hr tablet, TAKE ONE TABLET BY MOUTH ONCE DAILY, Disp: 90 tablet, Rfl: 3    pravastatin (PRAVACHOL) 40 MG tablet, Take 1 tablet (40 mg total) by mouth every evening., Disp: 90 tablet, Rfl: 3  No current facility-administered medications for this visit.     Facility-Administered Medications Ordered in Other Visits:     epoetin yamila injection 20,000 Units, 20,000 Units, Subcutaneous, 1 time in Clinic/HOD, Brittnee Shay MD    REVIEW OF SYSTEMS:  GENERAL:  No fever or chills.  Weight stable continued increasing fatigue  HEENT:  No photophobia, rhinorrhea, sinus congestion, tinnitus, gingival   bleeding, oral ulcers, or sore throat.  RESPIRATORY:  No shortness of breath, cough, or wheezing.  GASTROINTESTINAL:  No abdominal pain, dysphagia, emesis, diarrhea, or   constipation.  No heartburn, abdominal distention, melena, or hematochezia.  GENITOURINARY:  No urinary frequency, hesitancy, dysuria, or hematuria.  MUSCULOSKELETAL:  No neck pain, back pain  NEUROLOGICAL:  No headaches,   Localized paresthesias in a wheelchair  PSYCH:  No agitation, change in behavior, anxiety, or depression.      PHYSICAL EXAMINATION:  BP (!) 144/65   Pulse (!) 52   Temp 98.1 °F (36.7 °C)   Resp 18   Ht 5' (1.524 m)   Wt 47.6 kg (104 lb 15 oz)   LMP  (LMP Unknown)   BMI 20.49 kg/m²     Gen: NAD, A and O x3  HEENT: NCAT,  OP clear  Neck: suppple, no JVD  Lungs: CTAB  CV: S1S2 with RRR  Abd: soft, NTND, + BS  Extr: No CCE, PAMELA       Lab Results   Component Value Date    WBC 3.70 (L) 05/30/2018    HGB 9.4 (L) 05/30/2018    HCT 28.5 (L) 05/30/2018     (H) 05/30/2018     (L) 05/30/2018     CMP  Sodium   Date Value Ref Range Status   05/17/2018 142 136 - 145 mmol/L Final     Potassium   Date Value Ref Range Status   05/17/2018 3.8 3.5 - 5.1 mmol/L Final     Chloride   Date  Value Ref Range Status   05/17/2018 113 (H) 95 - 110 mmol/L Final     CO2   Date Value Ref Range Status   05/17/2018 20 (L) 23 - 29 mmol/L Final     Glucose   Date Value Ref Range Status   05/17/2018 202 (H) 70 - 110 mg/dL Final     BUN, Bld   Date Value Ref Range Status   05/17/2018 24 10 - 30 mg/dL Final     Creatinine   Date Value Ref Range Status   05/17/2018 0.9 0.5 - 1.4 mg/dL Final     Calcium   Date Value Ref Range Status   05/17/2018 9.2 8.7 - 10.5 mg/dL Final     Total Protein   Date Value Ref Range Status   05/17/2018 6.8 6.0 - 8.4 g/dL Final     Albumin   Date Value Ref Range Status   05/17/2018 2.9 (L) 3.5 - 5.2 g/dL Final     Total Bilirubin   Date Value Ref Range Status   05/17/2018 0.4 0.1 - 1.0 mg/dL Final     Comment:     For infants and newborns, interpretation of results should be based  on gestational age, weight and in agreement with clinical  observations.  Premature Infant recommended reference ranges:  Up to 24 hours.............<8.0 mg/dL  Up to 48 hours............<12.0 mg/dL  3-5 days..................<15.0 mg/dL  6-29 days.................<15.0 mg/dL       Alkaline Phosphatase   Date Value Ref Range Status   05/17/2018 76 55 - 135 U/L Final     AST   Date Value Ref Range Status   05/17/2018 20 10 - 40 U/L Final     ALT   Date Value Ref Range Status   05/17/2018 11 10 - 44 U/L Final     Anion Gap   Date Value Ref Range Status   05/17/2018 9 8 - 16 mmol/L Final     eGFR if    Date Value Ref Range Status   05/17/2018 >60 >60 mL/min/1.73 m^2 Final     eGFR if non    Date Value Ref Range Status   05/17/2018 55 (A) >60 mL/min/1.73 m^2 Final     Comment:     Calculation used to obtain the estimated glomerular filtration  rate (eGFR) is the CKD-EPI equation.       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  -     epoetin yamila (PROCRIT) injection 20,000 Units; Inject 1 mL (20,000 Units total) into the skin one time.          Proceed with procrit weekly times 2 then RTC with repeat cbc  Check iron next visit   Leukopenia and thrombocytopenia due to malnutrition  Increase intake of ensure '    Cont HTN meds: monitored by PCP  Abstain from using any NSAIDS or H 2 blockers     RTC 2 weeks with cbc and diff , iron     Lengthy discussion as to her diet and activity she could tolerate.  She is to remain on calcium and vitamin D for osteoporosis  Discussed end of life issues as well given patient's current PS and weight loss

## 2018-06-07 ENCOUNTER — TELEPHONE (OUTPATIENT)
Dept: HEMATOLOGY/ONCOLOGY | Facility: CLINIC | Age: 83
End: 2018-06-07

## 2018-06-07 NOTE — TELEPHONE ENCOUNTER
----- Message from Brii Knott sent at 6/7/2018  9:24 AM CDT -----  Contact: Zina, daughter  Requesting call back regarding Procrit shot.  Please call [phone] (home)

## 2018-06-07 NOTE — TELEPHONE ENCOUNTER
----- Message from Brii Knott sent at 6/7/2018  9:24 AM CDT -----  Contact: Zina, daughter  Requesting call back regarding Procrit shot.  Please call [phone] (home)

## 2018-06-07 NOTE — TELEPHONE ENCOUNTER
Nurse spoke with Catarina at [facility] scheduling.  She advised last procrit May 10th, 2nd of 2 doses received.  Any additional procrit will need orders, clearance and scheduled.

## 2018-06-11 ENCOUNTER — LAB VISIT (OUTPATIENT)
Dept: LAB | Facility: HOSPITAL | Age: 83
End: 2018-06-11
Attending: INTERNAL MEDICINE
Payer: MEDICARE

## 2018-06-11 DIAGNOSIS — Z79.899 ERYTHROPOIETIN (EPO) STIMULATING AGENT ANEMIA MANAGEMENT PATIENT: ICD-10-CM

## 2018-06-11 DIAGNOSIS — D64.9 ERYTHROPOIETIN (EPO) STIMULATING AGENT ANEMIA MANAGEMENT PATIENT: ICD-10-CM

## 2018-06-11 LAB
BASOPHILS # BLD AUTO: 0 K/UL
BASOPHILS NFR BLD: 0.9 %
DIFFERENTIAL METHOD: ABNORMAL
EOSINOPHIL # BLD AUTO: 0.2 K/UL
EOSINOPHIL NFR BLD: 5.4 %
ERYTHROCYTE [DISTWIDTH] IN BLOOD BY AUTOMATED COUNT: 14.9 %
HCT VFR BLD AUTO: 25 %
HGB BLD-MCNC: 8.4 G/DL
IRON SERPL-MCNC: 55 UG/DL
LYMPHOCYTES # BLD AUTO: 1.8 K/UL
LYMPHOCYTES NFR BLD: 43.1 %
MCH RBC QN AUTO: 33.3 PG
MCHC RBC AUTO-ENTMCNC: 33.7 G/DL
MCV RBC AUTO: 99 FL
MONOCYTES # BLD AUTO: 0.3 K/UL
MONOCYTES NFR BLD: 7.8 %
NEUTROPHILS # BLD AUTO: 1.7 K/UL
NEUTROPHILS NFR BLD: 42.8 %
PLATELET # BLD AUTO: 113 K/UL
PMV BLD AUTO: 12.1 FL
RBC # BLD AUTO: 2.53 M/UL
SATURATED IRON: 22 %
TOTAL IRON BINDING CAPACITY: 253 UG/DL
TRANSFERRIN SERPL-MCNC: 171 MG/DL
WBC # BLD AUTO: 4.1 K/UL

## 2018-06-11 PROCEDURE — 85025 COMPLETE CBC W/AUTO DIFF WBC: CPT

## 2018-06-11 PROCEDURE — 83540 ASSAY OF IRON: CPT

## 2018-06-11 PROCEDURE — 36415 COLL VENOUS BLD VENIPUNCTURE: CPT

## 2018-06-13 ENCOUNTER — DOCUMENTATION ONLY (OUTPATIENT)
Dept: FAMILY MEDICINE | Facility: CLINIC | Age: 83
End: 2018-06-13

## 2018-06-14 ENCOUNTER — OFFICE VISIT (OUTPATIENT)
Dept: HEMATOLOGY/ONCOLOGY | Facility: CLINIC | Age: 83
End: 2018-06-14
Payer: MEDICARE

## 2018-06-14 VITALS
RESPIRATION RATE: 18 BRPM | DIASTOLIC BLOOD PRESSURE: 75 MMHG | BODY MASS INDEX: 22.29 KG/M2 | TEMPERATURE: 98 F | HEIGHT: 60 IN | HEART RATE: 49 BPM | SYSTOLIC BLOOD PRESSURE: 171 MMHG | WEIGHT: 113.56 LBS

## 2018-06-14 DIAGNOSIS — E11.21 TYPE 2 DIABETES MELLITUS WITH DIABETIC NEPHROPATHY, WITHOUT LONG-TERM CURRENT USE OF INSULIN: ICD-10-CM

## 2018-06-14 DIAGNOSIS — D64.9 ERYTHROPOIETIN (EPO) STIMULATING AGENT ANEMIA MANAGEMENT PATIENT: ICD-10-CM

## 2018-06-14 DIAGNOSIS — Z79.899 ERYTHROPOIETIN (EPO) STIMULATING AGENT ANEMIA MANAGEMENT PATIENT: ICD-10-CM

## 2018-06-14 DIAGNOSIS — N18.9 ANEMIA ASSOCIATED WITH CHRONIC RENAL FAILURE: Primary | ICD-10-CM

## 2018-06-14 DIAGNOSIS — D63.1 ANEMIA ASSOCIATED WITH CHRONIC RENAL FAILURE: Primary | ICD-10-CM

## 2018-06-14 DIAGNOSIS — M81.8 OTHER OSTEOPOROSIS WITHOUT CURRENT PATHOLOGICAL FRACTURE: ICD-10-CM

## 2018-06-14 PROCEDURE — 99999 PR PBB SHADOW E&M-EST. PATIENT-LVL III: CPT | Mod: PBBFAC,,, | Performed by: INTERNAL MEDICINE

## 2018-06-14 PROCEDURE — 99214 OFFICE O/P EST MOD 30 MIN: CPT | Mod: S$GLB,,, | Performed by: INTERNAL MEDICINE

## 2018-06-14 NOTE — PROGRESS NOTES
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  Pt's daughter is worried that she may have an ear infection   Pt here for hyperferritinemia and anemia renal disease and suspected myeloma  Cannot use exjade because calculated GFR is too low    Here for re evaluation of anemia and the need for further procrit or iron  Has a pancreatic mass but desires no further workup or intervention regarding such : last MRI ? Cyst in pancreatic tail   SHe does have a history of stroke and carotid artery stenosis.   She is tolerating meds lisinopril and Procardia for HTN      Current Outpatient Prescriptions:     aspirin (ECOTRIN) 81 MG EC tablet, Take 81 mg by mouth once daily., Disp: , Rfl:     C,E,zinc,copper 11-omega3s-lut (OCUVITE ADULT 50 PLUS) 250-5-1 mg Cap, Take 1 tablet by mouth once daily., Disp: , Rfl:     cholecalciferol, vitamin D3, (VITAMIN D3) 2,000 unit Cap, Take 1 capsule by mouth once daily., Disp: , Rfl:     ciclopirox (PENLAC) 8 % Soln, Apply topically nightly., Disp: 6.6 mL, Rfl: 11    cloNIDine (CATAPRES) 0.1 MG tablet, Take 1 tablet (0.1 mg total) by mouth every morning., Disp: 90 tablet, Rfl: 3    clopidogrel (PLAVIX) 75 mg tablet, Take 1 tablet (75 mg total) by mouth once daily., Disp: 90 tablet, Rfl: 3    cyanocobalamin (VITAMIN B-12) 1000 MCG tablet, Take 1,000 mcg by mouth once daily. , Disp: , Rfl:     docusate sodium (COLACE) 100 MG capsule, Take 100 mg by mouth every evening., Disp: , Rfl:     furosemide (LASIX) 40 MG tablet, Take 1 tablet (40 mg total) by mouth every Mon, Wed, Fri., Disp: , Rfl:     IRON 325 mg (65 mg iron) Tab tablet, TAKE ONE TABLET BY MOUTH ONCE DAILY, Disp: 90 tablet, Rfl: 0    KLOR-CON M20 20 mEq tablet, TAKE 1 TABLET BY MOUTH EVERY MONDAY, WEDNESDAY, AND FRIDAY, Disp: 36 tablet, Rfl: 1     lisinopril (PRINIVIL,ZESTRIL) 40 MG tablet, Take 1 tablet (40 mg total) by mouth once daily., Disp: 90 tablet, Rfl: 3    NIFEdipine (PROCARDIA-XL) 60 MG (OSM) 24 hr tablet, TAKE ONE TABLET BY MOUTH ONCE DAILY, Disp: 90 tablet, Rfl: 3    nut.tx,spec.frm,l-fr,iron-fos (TWOCAL HN) 0.08-2 gram-kcal/mL Liqd, Take by mouth every 6 (six) hours., Disp: , Rfl:     pravastatin (PRAVACHOL) 40 MG tablet, Take 1 tablet (40 mg total) by mouth every evening., Disp: 90 tablet, Rfl: 3  No current facility-administered medications for this visit.     Facility-Administered Medications Ordered in Other Visits:     epoetin yamila injection 20,000 Units, 20,000 Units, Subcutaneous, 1 time in Clinic/HOD, Brittnee Shay MD    REVIEW OF SYSTEMS:  GENERAL:  No fever or chills.  Weight stable continued increasing fatigue  HEENT:  No photophobia, rhinorrhea, sinus congestion, tinnitus, gingival   bleeding, oral ulcers, or sore throat.  RESPIRATORY:  No shortness of breath, cough, or wheezing.  GASTROINTESTINAL:  No abdominal pain, dysphagia, emesis, diarrhea, or   constipation.  No heartburn, abdominal distention, melena, or hematochezia.  GENITOURINARY:  No urinary frequency, hesitancy, dysuria, or hematuria.  MUSCULOSKELETAL:  No neck pain, back pain  NEUROLOGICAL:  No headaches,   Localized paresthesias in a wheelchair  PSYCH:  No agitation, change in behavior, anxiety, or depression.      PHYSICAL EXAMINATION:  BP (!) 171/75   Pulse (!) 49   Temp 97.6 °F (36.4 °C)   Resp 18   Ht 5' (1.524 m)   Wt 51.5 kg (113 lb 8.6 oz)   LMP  (LMP Unknown)   BMI 22.17 kg/m²     Constitutional: oriented to person, place, and time.  well-developed and well-nourished.   HENT:   Head: Normocephalic and atraumatic.   Right Ear: External ear normal.   Left Ear: External ear normal.   Nose: Nose normal.   Mouth/Throat: Oropharynx is clear and moist.   Eyes: Conjunctivae and EOM are normal. Pupils are equal, round, and reactive to light.   Neck: Normal  range of motion. Neck supple. No thyromegaly present.   Cardiovascular: Normal rate, regular rhythm, normal heart sounds and intact distal pulses.    No murmur heard.  Pulmonary/Chest: Effort normal and breath sounds normal.  no wheezes.  no rales.   Abdominal: Soft. Bowel sounds are normal.  no mass. There is no tenderness. There is no rebound.   Musculoskeletal: Normal range of motion.  + ankle edema no  tenderness.   Lymphadenopathy:    no cervical adenopathy.   Neurological: alert and oriented to person, place, and time.No cranial nerve deficit.   Skin: Skin is warm and dry. No rash noted.   Psychiatric: normal mood and affect.  We behavior is normal.   Vitals reviewed.       Lab Results   Component Value Date    WBC 4.10 06/11/2018    HGB 8.4 (L) 06/11/2018    HCT 25.0 (L) 06/11/2018    MCV 99 (H) 06/11/2018     (L) 06/11/2018     CMP  Sodium   Date Value Ref Range Status   05/17/2018 142 136 - 145 mmol/L Final     Potassium   Date Value Ref Range Status   05/17/2018 3.8 3.5 - 5.1 mmol/L Final     Chloride   Date Value Ref Range Status   05/17/2018 113 (H) 95 - 110 mmol/L Final     CO2   Date Value Ref Range Status   05/17/2018 20 (L) 23 - 29 mmol/L Final     Glucose   Date Value Ref Range Status   05/17/2018 202 (H) 70 - 110 mg/dL Final     BUN, Bld   Date Value Ref Range Status   05/17/2018 24 10 - 30 mg/dL Final     Creatinine   Date Value Ref Range Status   05/17/2018 0.9 0.5 - 1.4 mg/dL Final     Calcium   Date Value Ref Range Status   05/17/2018 9.2 8.7 - 10.5 mg/dL Final     Total Protein   Date Value Ref Range Status   05/17/2018 6.8 6.0 - 8.4 g/dL Final     Albumin   Date Value Ref Range Status   05/17/2018 2.9 (L) 3.5 - 5.2 g/dL Final     Total Bilirubin   Date Value Ref Range Status   05/17/2018 0.4 0.1 - 1.0 mg/dL Final     Comment:     For infants and newborns, interpretation of results should be based  on gestational age, weight and in agreement with clinical  observations.  Premature  Infant recommended reference ranges:  Up to 24 hours.............<8.0 mg/dL  Up to 48 hours............<12.0 mg/dL  3-5 days..................<15.0 mg/dL  6-29 days.................<15.0 mg/dL       Alkaline Phosphatase   Date Value Ref Range Status   05/17/2018 76 55 - 135 U/L Final     AST   Date Value Ref Range Status   05/17/2018 20 10 - 40 U/L Final     ALT   Date Value Ref Range Status   05/17/2018 11 10 - 44 U/L Final     Anion Gap   Date Value Ref Range Status   05/17/2018 9 8 - 16 mmol/L Final     eGFR if    Date Value Ref Range Status   05/17/2018 >60 >60 mL/min/1.73 m^2 Final     eGFR if non    Date Value Ref Range Status   05/17/2018 55 (A) >60 mL/min/1.73 m^2 Final     Comment:     Calculation used to obtain the estimated glomerular filtration  rate (eGFR) is the CKD-EPI equation.       ferritin level was 20/1/40 and is decreased to 1191  Iron is 60 as it was 5 months ago in saturation of 25 as it was 24 5 months ago  Lab Results   Component Value Date    FERRITIN 355 (H) 05/30/2018       Anemia associated with chronic renal failure  -     CBC auto differential; Future; Expected date: 06/14/2018  -     Iron and TIBC; Future; Expected date: 06/14/2018    Erythropoietin (EPO) stimulating agent anemia management patient    Other osteoporosis without current pathological fracture    Type 2 diabetes mellitus with diabetic nephropathy, without long-term current use of insulin        Proceed with procrit weekly times 2 then RTC with repeat cbc  Check iron next visit   Leukopenia and thrombocytopenia due to malnutrition  Increase intake of ensure    Cont HTN meds: monitored by PCP  Abstain from using any NSAIDS or H 2 blockers     RTC 2 weeks with cbc and diff , iron     Lengthy discussion as to her diet and activity she could tolerate.  She is to remain on calcium and vitamin D for osteoporosis  Discussed end of life issues as well given patient's current PS and weight loss

## 2018-06-18 ENCOUNTER — TELEPHONE (OUTPATIENT)
Dept: HEMATOLOGY/ONCOLOGY | Facility: CLINIC | Age: 83
End: 2018-06-18

## 2018-06-18 PROBLEM — D64.9 ERYTHROPOIETIN (EPO) STIMULATING AGENT ANEMIA MANAGEMENT PATIENT: Status: ACTIVE | Noted: 2017-02-16

## 2018-06-18 PROBLEM — Z79.899 ERYTHROPOIETIN (EPO) STIMULATING AGENT ANEMIA MANAGEMENT PATIENT: Status: ACTIVE | Noted: 2017-02-16

## 2018-06-18 NOTE — TELEPHONE ENCOUNTER
Daughter notified that [facility] infusion will be calling to schedule.

## 2018-06-19 ENCOUNTER — TELEPHONE (OUTPATIENT)
Dept: HEMATOLOGY/ONCOLOGY | Facility: CLINIC | Age: 83
End: 2018-06-19

## 2018-06-19 ENCOUNTER — OFFICE VISIT (OUTPATIENT)
Dept: FAMILY MEDICINE | Facility: CLINIC | Age: 83
End: 2018-06-19
Payer: MEDICARE

## 2018-06-19 VITALS
SYSTOLIC BLOOD PRESSURE: 133 MMHG | BODY MASS INDEX: 21.73 KG/M2 | DIASTOLIC BLOOD PRESSURE: 53 MMHG | HEART RATE: 49 BPM | HEIGHT: 60 IN | WEIGHT: 110.69 LBS | TEMPERATURE: 98 F

## 2018-06-19 DIAGNOSIS — R54 FRAIL ELDERLY: ICD-10-CM

## 2018-06-19 DIAGNOSIS — G31.09 OTHER FRONTOTEMPORAL DEMENTIA WITHOUT BEHAVIORAL DISTURBANCE: ICD-10-CM

## 2018-06-19 DIAGNOSIS — E11.59 HYPERTENSION ASSOCIATED WITH DIABETES: ICD-10-CM

## 2018-06-19 DIAGNOSIS — D89.0 POLYCLONAL GAMMOPATHY DETERMINED BY SERUM PROTEIN ELECTROPHORESIS: ICD-10-CM

## 2018-06-19 DIAGNOSIS — D64.9 ERYTHROPOIETIN (EPO) STIMULATING AGENT ANEMIA MANAGEMENT PATIENT: ICD-10-CM

## 2018-06-19 DIAGNOSIS — Z79.899 ERYTHROPOIETIN (EPO) STIMULATING AGENT ANEMIA MANAGEMENT PATIENT: ICD-10-CM

## 2018-06-19 DIAGNOSIS — R63.6 UNDERWEIGHT: ICD-10-CM

## 2018-06-19 DIAGNOSIS — R29.6 FREQUENT FALLS: ICD-10-CM

## 2018-06-19 DIAGNOSIS — Z86.73 HISTORY OF STROKE: ICD-10-CM

## 2018-06-19 DIAGNOSIS — E11.21 TYPE 2 DIABETES MELLITUS WITH DIABETIC NEPHROPATHY, WITHOUT LONG-TERM CURRENT USE OF INSULIN: Primary | ICD-10-CM

## 2018-06-19 DIAGNOSIS — I15.2 HYPERTENSION ASSOCIATED WITH DIABETES: ICD-10-CM

## 2018-06-19 DIAGNOSIS — F02.80 OTHER FRONTOTEMPORAL DEMENTIA WITHOUT BEHAVIORAL DISTURBANCE: ICD-10-CM

## 2018-06-19 DIAGNOSIS — D53.9 ANEMIA, MACROCYTIC: ICD-10-CM

## 2018-06-19 DIAGNOSIS — E78.5 HYPERLIPIDEMIA ASSOCIATED WITH TYPE 2 DIABETES MELLITUS: ICD-10-CM

## 2018-06-19 DIAGNOSIS — E11.69 HYPERLIPIDEMIA ASSOCIATED WITH TYPE 2 DIABETES MELLITUS: ICD-10-CM

## 2018-06-19 PROCEDURE — 99999 PR PBB SHADOW E&M-EST. PATIENT-LVL IV: CPT | Mod: PBBFAC,,, | Performed by: FAMILY MEDICINE

## 2018-06-19 PROCEDURE — 99214 OFFICE O/P EST MOD 30 MIN: CPT | Mod: S$GLB,,, | Performed by: FAMILY MEDICINE

## 2018-06-19 NOTE — PROGRESS NOTES
CHIEF COMPLAINT: follow up type 2 DM, hypertension, hyperlipidemia.       HISTORY OF PRESENT ILLNESS:  Jaqui Odell is a 89 y.o. female who presents to clinic forllow up.  She is accompanied by her daughters.     1. Type 2 DM: A last HgA1c was was 5.4%.  Her amaryl has been discontinued. She is up to date on her pneumovax, prevnar 13. She is on a statin, ASA. She follows up with podiatry.    2 HTN: Patient is established with cardiology. She is on procardia, lisinopril, lasix,catapres, potassium supplementation.     3. Hyperlipidemia: She is on pravastatin, ASA.  She denies any myalgia, dark colored urine.    4. They request home health for help with EPO injections, and lab draws. She was previously evaluated for hospice but did not qualify      REVIEW OF SYSTEMS:  The patient denies any fever, chills, night sweats, headaches, vision changes, difficulty speaking or swallowing,  weight loss, weight gain, chest pain, palpitations, shortness of breath, cough, nausea, vomiting, abdominal pain, dysuria, diarrhea, constipation, hematuria, hematochezia, melena, changes in her hair, skin, nails, numbness or weakness in her extremities, erythema, pain or swelling over any of her joints, myalgias, swollen glands, easy bruising, fatigue.    MEDICATIONS:   Reviewed and/or reconciled in EPIC    ALLERGIES:  Reviewed and/or reconciled in [facility]    PAST MEDICAL/SURGICAL HISTORY:   Past Medical History:   Diagnosis Date    Callus     Carotid artery occlusion     Diabetes mellitus     Diabetes mellitus, type 2     Hyperlipidemia     Hypertension     Stroke     Syncope and collapse       Past Surgical History:   Procedure Laterality Date    adrenal insufficiency      CARDIAC CATHETERIZATION      CORONARY ANGIOPLASTY      HYSTERECTOMY         FAMILY HISTORY:  No family history on file.    SOCIAL HISTORY:    Social History     Social History    Marital status:      Spouse name: N/A    Number of children: N/A     Years of education: N/A     Occupational History    Not on file.     Social History Main Topics    Smoking status: Never Smoker    Smokeless tobacco: Never Used    Alcohol use No    Drug use: No    Sexual activity: No     Other Topics Concern    Not on file     Social History Narrative    No narrative on file       PHYSICAL EXAM:  VITAL SIGNS:   Vitals:    06/19/18 1115   BP: (!) 133/53   BP Location: Left arm   Patient Position: Sitting   BP Method: Medium (Automatic)   Pulse: (!) 49   Temp: 97.8 °F (36.6 °C)   TempSrc: Oral   Weight: 50.2 kg (110 lb 10.7 oz)   Height: 5' (1.524 m)     GENERAL:  Patient appears underweight, sitting in wheelchair, in no acute distress.  HEENT:  Atraumatic, normocephalic, PERRLA, EOMI, no conjunctival injection, sclerae are anicteric, b/l cerumen impaction, gross hearing intact to whisper, MMM, no oropharygneal erythema or exudate.  NECK:  Supple, normal ROM, trachea is midline , no supraclavicular or cervical LAD or masses palpated.   CARDIOVASCULAR:  RRR, normal S1 and S2, no m/r/g.  RESPIRATORY:  CTA b/l, no wheezes, rhonchi, rales.  No increased work of breathing, no  use of accessory muscles.  ABDOMEN:  Soft, nontender, nondistended, normoactive bowel sounds in all four quadrants, no rebound or guarding, no HSM or masses palpated.  Normal percussion.  EXTREMITIES:  2+ DP pulses b/l, no edema.  SKIN:  Warm, no lesions on exposed skin.  NEUROMUSCULAR:  Cranial nerves II-XII grossly intact. No clubbing or cyanosis of digits/nails.   PSYCH:  Patient is alert and oriented to person, time, place. She is appropriately dressed and groomed. There is normal eye contact. Rate and tone of speech is normal. Normal insight, judgement. Normal thought content and process.     LABORATORY/IMAGING STUDIES: pending    ASSESSMENT/PLAN: This is a 89 y.o. female who presents to clinic for follow up  1. Type 2 diabetes mellitus with diabetic nephropathy, without long-term current use of  insulin  See below    2. Hypertension associated with diabetes  See below    3. Hyperlipidemia associated with type 2 diabetes mellitus  - Lipid panel; Future      4. 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   Underweight: stable, follow up with oncology    Goals: Diabetes: Maintain Hemoglobin A1C below 7 and Hypertension: Reduce Blood Pressure underweight: increase calorie intake/weight gain    Did patient meet goals/outcomes: no    The following self management tools provided: declined    Patient Instructions (the written plan) was given to the patient/family.     Time spent with patient: 30 minutes    FOLLOW UP: 6 months    Ruth Pink MD

## 2018-06-20 NOTE — TELEPHONE ENCOUNTER
Spoke with pt's daughter, Zina. Zina states they received authorization letter on 6/5 & have been waiting since then to schedule then once pt was scheduled, pt was pushed back. Advised Zina to contact [facility] infusion to see if pt can be seen sooner. Zina also advised to fill out comment card informing of this. Zina states [facility] infusion was told by pt's family they had authorization.

## 2018-06-20 NOTE — TELEPHONE ENCOUNTER
----- Message from Jennie Michele sent at 6/20/2018 10:06 AM CDT -----  Contact: daughter Zina Orta [phone]  Please call her regarding the procrit injection.  She said that she received a letter from [employer] saying it was approved.  The letter was dated 6/12.  Please call her to schedule an appt as soon as possible.  She has been sleeping a lot.  Thank you!

## 2018-06-25 ENCOUNTER — TELEPHONE (OUTPATIENT)
Dept: FAMILY MEDICINE | Facility: CLINIC | Age: 83
End: 2018-06-25

## 2018-06-25 RX ORDER — FUROSEMIDE 40 MG/1
TABLET ORAL
Qty: 90 TABLET | Refills: 3 | Status: ON HOLD | OUTPATIENT
Start: 2018-06-25 | End: 2018-11-26 | Stop reason: HOSPADM

## 2018-06-25 NOTE — TELEPHONE ENCOUNTER
----- Message from Jovita Nagel sent at 6/25/2018 10:38 AM CDT -----  Contact: Beatriz from [employer]  Call place to POD    Beatriz from [employer] needing to speak to Linnea or Floridalma regarding a referral to clarify orders for Home Health     Please call to advise [phone] Beatriz requesting a return call

## 2018-06-25 NOTE — TELEPHONE ENCOUNTER
----- Message from Kiana Thomas sent at 6/25/2018  1:58 PM CDT -----  Type: Needs Medical Advice    Who Called:  People health representative- Gretchen   Symptoms (please be specific):  NA   How long has patient had these symptoms:  NOEMI  Pharmacy name and phone #:  NOEMI   Best Call Back Number: [phone]  Additional Information: [employer] representative returning the nurse phone call.what discipline the doctor for the patient for home health.

## 2018-06-27 ENCOUNTER — TELEPHONE (OUTPATIENT)
Dept: HEMATOLOGY/ONCOLOGY | Facility: CLINIC | Age: 83
End: 2018-06-27

## 2018-06-27 ENCOUNTER — LAB VISIT (OUTPATIENT)
Dept: LAB | Facility: HOSPITAL | Age: 83
End: 2018-06-27
Attending: INTERNAL MEDICINE
Payer: MEDICARE

## 2018-06-27 DIAGNOSIS — D64.9 ANEMIA, UNSPECIFIED TYPE: Primary | ICD-10-CM

## 2018-06-27 DIAGNOSIS — D64.9 ANEMIA, UNSPECIFIED TYPE: ICD-10-CM

## 2018-06-27 LAB
ALBUMIN SERPL BCP-MCNC: 3 G/DL
ALP SERPL-CCNC: 75 U/L
ALT SERPL W/O P-5'-P-CCNC: 13 U/L
ANION GAP SERPL CALC-SCNC: 7 MMOL/L
AST SERPL-CCNC: 25 U/L
BASOPHILS # BLD AUTO: 0 K/UL
BASOPHILS NFR BLD: 1.1 %
BILIRUB SERPL-MCNC: 0.4 MG/DL
BUN SERPL-MCNC: 40 MG/DL
CALCIUM SERPL-MCNC: 9.2 MG/DL
CHLORIDE SERPL-SCNC: 111 MMOL/L
CO2 SERPL-SCNC: 23 MMOL/L
CREAT SERPL-MCNC: 1.1 MG/DL
DIFFERENTIAL METHOD: ABNORMAL
EOSINOPHIL # BLD AUTO: 0.2 K/UL
EOSINOPHIL NFR BLD: 4.1 %
ERYTHROCYTE [DISTWIDTH] IN BLOOD BY AUTOMATED COUNT: 15.9 %
EST. GFR  (AFRICAN AMERICAN): 50 ML/MIN/1.73 M^2
EST. GFR  (NON AFRICAN AMERICAN): 43 ML/MIN/1.73 M^2
GLUCOSE SERPL-MCNC: 143 MG/DL
HCT VFR BLD AUTO: 27.6 %
HGB BLD-MCNC: 9.1 G/DL
LYMPHOCYTES # BLD AUTO: 1.9 K/UL
LYMPHOCYTES NFR BLD: 44.1 %
MCH RBC QN AUTO: 33 PG
MCHC RBC AUTO-ENTMCNC: 32.8 G/DL
MCV RBC AUTO: 101 FL
MONOCYTES # BLD AUTO: 0.3 K/UL
MONOCYTES NFR BLD: 8 %
NEUTROPHILS # BLD AUTO: 1.9 K/UL
NEUTROPHILS NFR BLD: 42.7 %
PLATELET # BLD AUTO: 138 K/UL
PMV BLD AUTO: 10.5 FL
POTASSIUM SERPL-SCNC: 4.2 MMOL/L
PROT SERPL-MCNC: 6.7 G/DL
RBC # BLD AUTO: 2.74 M/UL
SODIUM SERPL-SCNC: 141 MMOL/L
WBC # BLD AUTO: 4.4 K/UL

## 2018-06-27 PROCEDURE — 80053 COMPREHEN METABOLIC PANEL: CPT

## 2018-06-27 PROCEDURE — 85025 COMPLETE CBC W/AUTO DIFF WBC: CPT

## 2018-06-27 PROCEDURE — 36415 COLL VENOUS BLD VENIPUNCTURE: CPT

## 2018-06-27 NOTE — TELEPHONE ENCOUNTER
Spoke with pt's daughter, Zina. Informed that pt can have her labs done today. Zina verbalized understanding.

## 2018-06-27 NOTE — TELEPHONE ENCOUNTER
----- Message from Soha Reyan sent at 6/27/2018  2:14 PM CDT -----  Please call patients daughter in regards to patients shots that she states is set for tomorrow.  Please call Zina at [phone].

## 2018-06-27 NOTE — TELEPHONE ENCOUNTER
----- Message from Fadumo Wright RT sent at 6/27/2018  2:39 PM CDT -----  Contact: Zina,Daughter,[phone]  Zina,Daughter,[phone] requesting the pt's lab test orders in today, she can bring the pt to have the labs drawn, please call to confirm, thanks.

## 2018-06-29 ENCOUNTER — TELEPHONE (OUTPATIENT)
Dept: HEMATOLOGY/ONCOLOGY | Facility: CLINIC | Age: 83
End: 2018-06-29

## 2018-06-29 NOTE — TELEPHONE ENCOUNTER
----- Message from Sabino Harvey sent at 6/29/2018  1:02 PM CDT -----  Contact: Zina  Type: Needs Medical Advice    Who Called:  Zina patient's daughter caregiver  Symptoms (please be specific):    How long has patient had these symptoms:    Pharmacy name and phone #:    Best Call Back Number: [phone]  Additional Information: stated home health nurse will draw blood at the pateint's home requesting [facility] fax#[phone] and ph# [phone]

## 2018-06-29 NOTE — TELEPHONE ENCOUNTER
Spoke with Yesi at [employer]. Orders for labs with draw date faxed to them. Yesi verbalized understanding and stated that pt's labs will be processed at [facility].     Informed pt's daughter, Zina, that labs will be drawn by home health. Zina verbalized understanding.

## 2018-07-09 ENCOUNTER — OFFICE VISIT (OUTPATIENT)
Dept: HEMATOLOGY/ONCOLOGY | Facility: CLINIC | Age: 83
End: 2018-07-09
Payer: MEDICARE

## 2018-07-09 VITALS
HEIGHT: 60 IN | SYSTOLIC BLOOD PRESSURE: 144 MMHG | WEIGHT: 111.56 LBS | HEART RATE: 45 BPM | RESPIRATION RATE: 16 BRPM | DIASTOLIC BLOOD PRESSURE: 63 MMHG | TEMPERATURE: 98 F | BODY MASS INDEX: 21.9 KG/M2

## 2018-07-09 DIAGNOSIS — D63.1 ANEMIA IN CHRONIC KIDNEY DISEASE, UNSPECIFIED CKD STAGE: Primary | ICD-10-CM

## 2018-07-09 DIAGNOSIS — N18.9 ANEMIA IN CHRONIC KIDNEY DISEASE, UNSPECIFIED CKD STAGE: Primary | ICD-10-CM

## 2018-07-09 DIAGNOSIS — Z86.39 HISTORY OF IRON DEFICIENCY: ICD-10-CM

## 2018-07-09 PROCEDURE — 99999 PR PBB SHADOW E&M-EST. PATIENT-LVL IV: CPT | Mod: PBBFAC,,, | Performed by: INTERNAL MEDICINE

## 2018-07-09 PROCEDURE — 99214 OFFICE O/P EST MOD 30 MIN: CPT | Mod: S$GLB,,, | Performed by: INTERNAL MEDICINE

## 2018-07-09 NOTE — PROGRESS NOTES
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  Pt's daughter is worried that she may have an ear infection   Pt here for hyperferritinemia and anemia renal disease and suspected myeloma  Cannot use exjade because calculated GFR is too low  Here for re evaluation of anemia and the need for further procrit or iron  Has a pancreatic mass but desires no further workup or intervention regarding such : last MRI ? Cyst in pancreatic tail   SHe does have a history of stroke and carotid artery stenosis.   She is tolerating meds lisinopril and Procardia for HTN      Current Outpatient Prescriptions:     aspirin (ECOTRIN) 81 MG EC tablet, Take 81 mg by mouth once daily., Disp: , Rfl:     C,E,zinc,copper 11-omega3s-lut (OCUVITE ADULT 50 PLUS) 250-5-1 mg Cap, Take 1 tablet by mouth once daily., Disp: , Rfl:     cholecalciferol, vitamin D3, (VITAMIN D3) 2,000 unit Cap, Take 1 capsule by mouth once daily., Disp: , Rfl:     ciclopirox (PENLAC) 8 % Soln, Apply topically nightly., Disp: 6.6 mL, Rfl: 11    cloNIDine (CATAPRES) 0.1 MG tablet, Take 1 tablet (0.1 mg total) by mouth every morning., Disp: 90 tablet, Rfl: 3    clopidogrel (PLAVIX) 75 mg tablet, Take 1 tablet (75 mg total) by mouth once daily., Disp: 90 tablet, Rfl: 3    cyanocobalamin (VITAMIN B-12) 1000 MCG tablet, Take 1,000 mcg by mouth once daily. , Disp: , Rfl:     docusate sodium (COLACE) 100 MG capsule, Take 100 mg by mouth every evening., Disp: , Rfl:     furosemide (LASIX) 40 MG tablet, TAKE ONE TABLET BY MOUTH EVERY MORNING, Disp: 90 tablet, Rfl: 3    IRON 325 mg (65 mg iron) Tab tablet, TAKE ONE TABLET BY MOUTH ONCE DAILY, Disp: 90 tablet, Rfl: 0    KLOR-CON M20 20 mEq tablet, TAKE 1 TABLET BY MOUTH EVERY MONDAY, WEDNESDAY, AND FRIDAY, Disp: 36 tablet, Rfl: 1    lisinopril  (PRINIVIL,ZESTRIL) 40 MG tablet, Take 1 tablet (40 mg total) by mouth once daily., Disp: 90 tablet, Rfl: 3    NIFEdipine (PROCARDIA-XL) 60 MG (OSM) 24 hr tablet, TAKE ONE TABLET BY MOUTH ONCE DAILY, Disp: 90 tablet, Rfl: 3    nut.tx,spec.frm,l-fr,iron-fos (TWOCAL HN) 0.08-2 gram-kcal/mL Liqd, Take by mouth every 6 (six) hours., Disp: , Rfl:     pravastatin (PRAVACHOL) 40 MG tablet, Take 1 tablet (40 mg total) by mouth every evening., Disp: 90 tablet, Rfl: 3  No current facility-administered medications for this visit.     Facility-Administered Medications Ordered in Other Visits:     epoetin yamila injection 20,000 Units, 20,000 Units, Subcutaneous, 1 time in Clinic/HOD, Brittnee Shay MD    REVIEW OF SYSTEMS:  GENERAL:  No fever or chills.  Weight stable continued increasing fatigue  HEENT:  No photophobia, rhinorrhea, sinus congestion, tinnitus, gingival   bleeding, oral ulcers, or sore throat.  RESPIRATORY:  No shortness of breath, cough, or wheezing.  GASTROINTESTINAL:  No abdominal pain, dysphagia, emesis, diarrhea, or   constipation.  No heartburn, abdominal distention, melena, or hematochezia.  GENITOURINARY:  No urinary frequency, hesitancy, dysuria, or hematuria.  MUSCULOSKELETAL:  No neck pain, back pain  NEUROLOGICAL:  No headaches,   Localized paresthesias in a wheelchair  PSYCH:  No agitation, change in behavior, or depression.      PHYSICAL EXAMINATION:  BP (!) 144/63   Pulse (!) 45   Temp 97.8 °F (36.6 °C)   Resp 16   Ht 5' (1.524 m)   Wt 50.6 kg (111 lb 8.8 oz)   LMP  (LMP Unknown)   BMI 21.79 kg/m²     Constitutional: oriented to person, place, and time.  well-developed and well-nourished.   Head: Normocephalic and atraumatic.   Right Ear: External ear normal.   Left Ear: External ear normal.   Nose: Nose normal.   Mouth/Throat: Oropharynx is clear and moist.   Eyes: Conjunctivae and EOM are normal.   Neck: Normal range of motion. Neck supple. No thyromegaly present.   Cardiovascular:  Bradycardia  regular rhythm, normal heart sounds   Pulmonary/Chest: Effort normal and breath sounds normal.   Abdominal: Soft.   Musculoskeletal: Normal range of motion.  + ankle edema   Neurological: alert and oriented to person, place, and time.No cranial nerve deficit.   Skin: Skin is warm and dry. No rash noted.   Psychiatric: normal mood and affect.    Vitals reviewed.       Lab Results   Component Value Date    WBC 4.40 06/27/2018    HGB 9.1 (L) 06/27/2018    HCT 27.6 (L) 06/27/2018     (H) 06/27/2018     (L) 06/27/2018     Wbc 5000  plts 162  Hb from quest 10.3 from July 3 2018   CMP  Sodium   Date Value Ref Range Status   06/27/2018 141 136 - 145 mmol/L Final     Potassium   Date Value Ref Range Status   06/27/2018 4.2 3.5 - 5.1 mmol/L Final     Chloride   Date Value Ref Range Status   06/27/2018 111 (H) 95 - 110 mmol/L Final     CO2   Date Value Ref Range Status   06/27/2018 23 23 - 29 mmol/L Final     Glucose   Date Value Ref Range Status   06/27/2018 143 (H) 70 - 110 mg/dL Final     BUN, Bld   Date Value Ref Range Status   06/27/2018 40 (H) 10 - 30 mg/dL Final     Creatinine   Date Value Ref Range Status   06/27/2018 1.1 0.5 - 1.4 mg/dL Final     Calcium   Date Value Ref Range Status   06/27/2018 9.2 8.7 - 10.5 mg/dL Final     Total Protein   Date Value Ref Range Status   06/27/2018 6.7 6.0 - 8.4 g/dL Final     Albumin   Date Value Ref Range Status   06/27/2018 3.0 (L) 3.5 - 5.2 g/dL Final     Total Bilirubin   Date Value Ref Range Status   06/27/2018 0.4 0.1 - 1.0 mg/dL Final     Comment:     For infants and newborns, interpretation of results should be based  on gestational age, weight and in agreement with clinical  observations.  Premature Infant recommended reference ranges:  Up to 24 hours.............<8.0 mg/dL  Up to 48 hours............<12.0 mg/dL  3-5 days..................<15.0 mg/dL  6-29 days.................<15.0 mg/dL       Alkaline Phosphatase   Date Value Ref Range Status    06/27/2018 75 55 - 135 U/L Final     AST   Date Value Ref Range Status   06/27/2018 25 10 - 40 U/L Final     ALT   Date Value Ref Range Status   06/27/2018 13 10 - 44 U/L Final     Anion Gap   Date Value Ref Range Status   06/27/2018 7 (L) 8 - 16 mmol/L Final     eGFR if    Date Value Ref Range Status   06/27/2018 50 (A) >60 mL/min/1.73 m^2 Final     eGFR if non    Date Value Ref Range Status   06/27/2018 43 (A) >60 mL/min/1.73 m^2 Final     Comment:     Calculation used to obtain the estimated glomerular filtration  rate (eGFR) is the CKD-EPI equation.       ferritin level was 20/1/40 and is decreased to 1191  Iron is 60 as it was 5 months ago in saturation of 25 as it was 24 5 months ago  Lab Results   Component Value Date    FERRITIN 355 (H) 05/30/2018       Anemia in chronic kidney disease, unspecified CKD stage  -     CBC auto differential; Future; Expected date: 07/09/2018  -     Iron and TIBC; Future; Expected date: 07/09/2018    History of iron deficiency  -     CBC auto differential; Future; Expected date: 07/09/2018  -     Iron and TIBC; Future; Expected date: 07/09/2018        Proceed with procrit weekly times 1 Goal is hb 11  Cont HTN meds: monitored by PCP  Abstain from using any NSAIDS or H 2 blockers   RTC 2 weeks with cbc and diff , iron to be drawn from home health  Bradycardia , increase salt and activity  Decrease blood pressure meds   Lengthy discussion as to her diet and activity she could tolerate.  She is to remain on calcium and vitamin D for osteoporosis

## 2018-07-13 ENCOUNTER — TELEPHONE (OUTPATIENT)
Dept: HEMATOLOGY/ONCOLOGY | Facility: CLINIC | Age: 83
End: 2018-07-13

## 2018-07-13 NOTE — TELEPHONE ENCOUNTER
----- Message from Yesi Cloud sent at 7/13/2018  1:57 PM CDT -----  Type: Needs Medical Advice    Who Called:  Samina with [facility]  Symptoms (please be specific):  n/a  How long has patient had these symptoms:  n/a  Pharmacy name and phone #:  n/a  Best Call Back Number: [phone]  Additional Information: Samina is requesting a call back from Juju regarding patient

## 2018-07-13 NOTE — TELEPHONE ENCOUNTER
----- Message from Elieser Ramirez sent at 7/13/2018  2:14 PM CDT -----  Contact: ceferino with [facility]  Ceferino was calling about hospice eligibility for Pt.  Please call and advise.    Call Back# [phone]  Thanks

## 2018-07-13 NOTE — TELEPHONE ENCOUNTER
----- Message from Colton Hunter sent at 7/13/2018  9:06 AM CDT -----  Contact: [facility], Samina Haas want to speak with a nurse regarding eligibility for hospice please call back at [phone]

## 2018-07-13 NOTE — TELEPHONE ENCOUNTER
Samina notified that Dr. Shay is out until 7/24/2018 and that I firmly believe she will agree with Hospice but the family was reluctant to when she spoke about it a few weeks ago.

## 2018-07-13 NOTE — TELEPHONE ENCOUNTER
Samina notified that Dr. Shay will not be back until 7/24/18 and that the family refused hospice when offered to patient, they agreed to home health. Zina Orta called without answer due to phone being disconnected to see if the family is requesting hospice, since our office has not been called.

## 2018-07-16 ENCOUNTER — TELEPHONE (OUTPATIENT)
Dept: HEMATOLOGY/ONCOLOGY | Facility: CLINIC | Age: 83
End: 2018-07-16

## 2018-07-16 NOTE — TELEPHONE ENCOUNTER
----- Message from Alysa Calvert sent at 7/16/2018 12:42 PM CDT -----  Contact: Samina Morales with the [facility]  Samina Morales with the [facility] calling Brii regarding patient's eligibility. Please call Samina at [phone].Thanks!

## 2018-07-24 ENCOUNTER — TELEPHONE (OUTPATIENT)
Dept: HEMATOLOGY/ONCOLOGY | Facility: CLINIC | Age: 83
End: 2018-07-24

## 2018-07-24 ENCOUNTER — TELEPHONE (OUTPATIENT)
Dept: ADMINISTRATIVE | Facility: CLINIC | Age: 83
End: 2018-07-24

## 2018-07-24 NOTE — TELEPHONE ENCOUNTER
----- Message from Alyssa Romero sent at 7/24/2018  1:44 PM CDT -----  Contact: [facility] [phone]  Requesting a call back from Brii, now that the Doctor is back from vacation, we would like the doctor to sign and have the certification form sent back to [phone].  Thank you!

## 2018-07-24 NOTE — TELEPHONE ENCOUNTER
Spoke with Zina to cancel appointment on Friday 7/27/18 due to patient wanting Hospice. Per Zina she is not sure that hospice is warranted at this time. I explained to her that the patient does not have to have it, but the Oncologist will not be able to treat her is she is on Hospice. I told Zina that it should be a family decision or for the person who has power of . Zina will call me back to let me know if they accept or decline hospice after talking with them to see what services will be provided.

## 2018-07-25 ENCOUNTER — TELEPHONE (OUTPATIENT)
Dept: HEMATOLOGY/ONCOLOGY | Facility: CLINIC | Age: 83
End: 2018-07-25

## 2018-07-25 NOTE — TELEPHONE ENCOUNTER
----- Message from Anali Franz sent at 7/25/2018 11:21 AM CDT -----  Contact: Nesha hoover/ [facility]  Type: Needs Medical Advice    Who Called:  Patient  Symptoms (please be specific):  na  How long has patient had these symptoms:  na  Pharmacy name and phone #:  na  Best Call Back Number: Nesha at   Additional Information: Calling to speak with the Nurse. The patient's family wants to Doctor to stay on with the patient while she is in Hospice. Please advise.

## 2018-07-25 NOTE — TELEPHONE ENCOUNTER
----- Message from Yesi Cloud sent at 7/25/2018 11:09 AM CDT -----  [facility]  Type: Needs [facility] Advice    Who Called:  Banner  Best Call Back Number: [phone]  Additional Information: would like to talk to Princess about Dr. Shay staying on as the patient's PCP.

## 2018-07-27 ENCOUNTER — OFFICE VISIT (OUTPATIENT)
Dept: HEMATOLOGY/ONCOLOGY | Facility: CLINIC | Age: 83
End: 2018-07-27
Payer: MEDICARE

## 2018-07-27 VITALS
SYSTOLIC BLOOD PRESSURE: 166 MMHG | HEART RATE: 53 BPM | WEIGHT: 110.25 LBS | HEIGHT: 60 IN | DIASTOLIC BLOOD PRESSURE: 69 MMHG | BODY MASS INDEX: 21.65 KG/M2 | RESPIRATION RATE: 16 BRPM | TEMPERATURE: 99 F

## 2018-07-27 DIAGNOSIS — I15.2 HYPERTENSION ASSOCIATED WITH DIABETES: ICD-10-CM

## 2018-07-27 DIAGNOSIS — E11.59 HYPERTENSION ASSOCIATED WITH DIABETES: ICD-10-CM

## 2018-07-27 DIAGNOSIS — D63.1 ANEMIA IN CHRONIC KIDNEY DISEASE, UNSPECIFIED CKD STAGE: Primary | ICD-10-CM

## 2018-07-27 DIAGNOSIS — Z71.89 COUNSELING REGARDING END OF LIFE DECISION MAKING: ICD-10-CM

## 2018-07-27 DIAGNOSIS — R54 FRAIL ELDERLY: ICD-10-CM

## 2018-07-27 DIAGNOSIS — N18.9 ANEMIA IN CHRONIC KIDNEY DISEASE, UNSPECIFIED CKD STAGE: Primary | ICD-10-CM

## 2018-07-27 DIAGNOSIS — R63.6 UNDERWEIGHT: ICD-10-CM

## 2018-07-27 DIAGNOSIS — R64 CACHEXIA: ICD-10-CM

## 2018-07-27 PROCEDURE — 99499 UNLISTED E&M SERVICE: CPT | Mod: S$GLB,,, | Performed by: INTERNAL MEDICINE

## 2018-07-27 PROCEDURE — 99215 OFFICE O/P EST HI 40 MIN: CPT | Mod: S$GLB,,, | Performed by: INTERNAL MEDICINE

## 2018-07-27 PROCEDURE — 99999 PR PBB SHADOW E&M-EST. PATIENT-LVL III: CPT | Mod: PBBFAC,,, | Performed by: INTERNAL MEDICINE

## 2018-07-27 NOTE — PROGRESS NOTES
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 Cyst in pancreatic tail   SHe does have a history of stroke and carotid artery stenosis.   She is tolerating meds lisinopril and Procardia for HTN      Current Outpatient Prescriptions:     aspirin (ECOTRIN) 81 MG EC tablet, Take 81 mg by mouth once daily., Disp: , Rfl:     C,E,zinc,copper 11-omega3s-lut (OCUVITE ADULT 50 PLUS) 250-5-1 mg Cap, Take 1 tablet by mouth once daily., Disp: , Rfl:     cholecalciferol, vitamin D3, (VITAMIN D3) 2,000 unit Cap, Take 1 capsule by mouth once daily., Disp: , Rfl:     ciclopirox (PENLAC) 8 % Soln, Apply topically nightly., Disp: 6.6 mL, Rfl: 11    cloNIDine (CATAPRES) 0.1 MG tablet, Take 1 tablet (0.1 mg total) by mouth every morning., Disp: 90 tablet, Rfl: 3    clopidogrel (PLAVIX) 75 mg tablet, Take 1 tablet (75 mg total) by mouth once daily., Disp: 90 tablet, Rfl: 3    cyanocobalamin (VITAMIN B-12) 1000 MCG tablet, Take 1,000 mcg by mouth once daily. , Disp: , Rfl:     docusate sodium (COLACE) 100 MG capsule, Take 100 mg by mouth every evening., Disp: , Rfl:     furosemide (LASIX) 40 MG tablet, TAKE ONE TABLET BY MOUTH EVERY MORNING, Disp: 90 tablet, Rfl: 3    IRON 325 mg (65 mg iron) Tab tablet, TAKE ONE TABLET BY MOUTH ONCE DAILY, Disp: 90 tablet, Rfl: 0    KLOR-CON M20 20 mEq tablet, TAKE 1 TABLET BY  MOUTH EVERY MONDAY, WEDNESDAY, AND FRIDAY, Disp: 36 tablet, Rfl: 1    lisinopril (PRINIVIL,ZESTRIL) 40 MG tablet, Take 1 tablet (40 mg total) by mouth once daily., Disp: 90 tablet, Rfl: 3    NIFEdipine (PROCARDIA-XL) 60 MG (OSM) 24 hr tablet, TAKE ONE TABLET BY MOUTH ONCE DAILY, Disp: 90 tablet, Rfl: 3    nut.tx,spec.frm,l-fr,iron-fos (TWOCAL HN) 0.08-2 gram-kcal/mL Liqd, Take by mouth every 6 (six) hours., Disp: , Rfl:     pravastatin (PRAVACHOL) 40 MG tablet, Take 1 tablet (40 mg total) by mouth every evening., Disp: 90 tablet, Rfl: 3  No current facility-administered medications for this visit.     Facility-Administered Medications Ordered in Other Visits:     epoetin yamila injection 20,000 Units, 20,000 Units, Subcutaneous, 1 time in Clinic/HOD, Brittnee Shay MD    REVIEW OF SYSTEMS:  GENERAL:  No fever or chills.  Weight stable continued increasing fatigue  HEENT:  No photophobia, rhinorrhea, sinus congestion, tinnitus, gingival   bleeding, oral ulcers, or sore throat.  RESPIRATORY:  No shortness of breath, cough, or wheezing.  GASTROINTESTINAL:  No abdominal pain, dysphagia, emesis, diarrhea, or   constipation.  No heartburn, abdominal distention, melena, or hematochezia.  GENITOURINARY:  No urinary frequency, hesitancy, dysuria, or hematuria.  MUSCULOSKELETAL:  No neck pain, back pain  NEUROLOGICAL:  No headaches,   Localized paresthesias in a wheelchair  PSYCH:  No agitation, change in behavior, or depression.      PHYSICAL EXAMINATION:  BP (!) 166/69   Pulse (!) 53   Temp 98.5 °F (36.9 °C)   Resp 16   Ht 5' (1.524 m)   Wt 50 kg (110 lb 3.7 oz)   LMP  (LMP Unknown)   BMI 21.53 kg/m²     Constitutional: oriented to person, place, and time.  well-developed and well-nourished.   Head: Normocephalic and atraumatic.   Right Ear: External ear normal.   Left Ear: External ear normal.   Nose: Nose normal.   Mouth/Throat: Oropharynx is clear and moist.   Eyes: Conjunctivae and EOM are normal.   Neck:  Normal range of motion. Neck supple. No thyromegaly present.   Cardiovascular: Bradycardia  regular rhythm, normal heart sounds   Pulmonary/Chest: Effort normal and breath sounds normal.   Abdominal: Soft.   Musculoskeletal: Normal range of motion.  + ankle edema   Neurological: alert and oriented to person, place, and time.  Skin: Skin is warm and dry. No rash noted.   Psychiatric: normal mood and affect.    Vitals reviewed.           Hb from quest 9.8  7-  Iron  47   CMP   ferritin level was 20/1/40 and is decreased to 1191  Iron is 60 as it was 5 months ago in saturation of 25 as it was 24 5 months ago    Anemia in chronic kidney disease, unspecified CKD stage    Counseling regarding end of life decision making    Cachexia    Frail elderly    Underweight    Hypertension associated with diabetes    Other orders  -     Cancel: epoetin yamila (PROCRIT) injection 20,000 Units; Inject 1 mL (20,000 Units total) into the skin one time.  -     epoetin yamila (PROCRIT) injection 20,000 Units; Inject 1 mL (20,000 Units total) into the skin one time.        Proceed with procrit weekly times 2 Goal is hb 11  Cont HTN meds at dose reduction  Abstain from using any NSAIDS or H 2 blockers   RTC 3 weeks with cbc and diff , iron to be drawn from hospice  Explained the benfits from hospsice and end of life expectations and prognosis  Bradycardia , increase salt and activity  Decrease blood pressure meds   Lengthy discussion as to her diet and activity she could tolerate.  She is to remain on calcium and vitamin D for osteoporosis

## 2018-08-17 ENCOUNTER — OFFICE VISIT (OUTPATIENT)
Dept: HEMATOLOGY/ONCOLOGY | Facility: CLINIC | Age: 83
End: 2018-08-17
Payer: MEDICARE

## 2018-08-17 VITALS
BODY MASS INDEX: 21.12 KG/M2 | DIASTOLIC BLOOD PRESSURE: 59 MMHG | HEIGHT: 60 IN | WEIGHT: 107.56 LBS | RESPIRATION RATE: 16 BRPM | HEART RATE: 49 BPM | TEMPERATURE: 97 F | SYSTOLIC BLOOD PRESSURE: 119 MMHG

## 2018-08-17 DIAGNOSIS — E11.21 TYPE 2 DIABETES MELLITUS WITH DIABETIC NEPHROPATHY, WITHOUT LONG-TERM CURRENT USE OF INSULIN: ICD-10-CM

## 2018-08-17 DIAGNOSIS — Z91.81 AT HIGH RISK FOR FALLS: ICD-10-CM

## 2018-08-17 DIAGNOSIS — N18.9 ANEMIA OF RENAL DISEASE: ICD-10-CM

## 2018-08-17 DIAGNOSIS — Z79.899 ERYTHROPOIETIN (EPO) STIMULATING AGENT ANEMIA MANAGEMENT PATIENT: ICD-10-CM

## 2018-08-17 DIAGNOSIS — Z71.1 CONCERN ABOUT END OF LIFE: ICD-10-CM

## 2018-08-17 DIAGNOSIS — M81.8 OTHER OSTEOPOROSIS WITHOUT CURRENT PATHOLOGICAL FRACTURE: ICD-10-CM

## 2018-08-17 DIAGNOSIS — D89.0 POLYCLONAL GAMMOPATHY DETERMINED BY SERUM PROTEIN ELECTROPHORESIS: Primary | ICD-10-CM

## 2018-08-17 DIAGNOSIS — R63.4 WEIGHT LOSS: ICD-10-CM

## 2018-08-17 DIAGNOSIS — N18.30 STAGE 3 CHRONIC KIDNEY DISEASE: ICD-10-CM

## 2018-08-17 DIAGNOSIS — D64.9 ERYTHROPOIETIN (EPO) STIMULATING AGENT ANEMIA MANAGEMENT PATIENT: ICD-10-CM

## 2018-08-17 DIAGNOSIS — D63.1 ANEMIA OF RENAL DISEASE: ICD-10-CM

## 2018-08-17 DIAGNOSIS — E61.1 IRON DEFICIENCY: ICD-10-CM

## 2018-08-17 PROCEDURE — 99999 PR PBB SHADOW E&M-EST. PATIENT-LVL IV: CPT | Mod: PBBFAC,,, | Performed by: INTERNAL MEDICINE

## 2018-08-17 PROCEDURE — 3288F FALL RISK ASSESSMENT DOCD: CPT | Mod: CPTII,S$GLB,, | Performed by: INTERNAL MEDICINE

## 2018-08-17 PROCEDURE — 99215 OFFICE O/P EST HI 40 MIN: CPT | Mod: GV,S$GLB,, | Performed by: INTERNAL MEDICINE

## 2018-08-17 NOTE — PROGRESS NOTES
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  Patient is now on hospice with.  Over the last week she has become more lethargic.  She remains conversive Ms. able to verbalize that she would like to continue living as long as possible.  Hospice has been very kind and allowing her to continue Procrit injections on an as-needed basis.  Pt here for hyperferritinemia and anemia renal disease and suspected myeloma  SHe does have a history of stroke and carotid artery stenosis.   She is tolerating meds lisinopril and Procardia for HTN.  She has continued pravachol for dyslipidemia      Current Outpatient Medications:     aspirin (ECOTRIN) 81 MG EC tablet, Take 81 mg by mouth once daily., Disp: , Rfl:     C,E,zinc,copper 11-omega3s-lut (OCUVITE ADULT 50 PLUS) 250-5-1 mg Cap, Take 1 tablet by mouth once daily., Disp: , Rfl:     cholecalciferol, vitamin D3, (VITAMIN D3) 2,000 unit Cap, Take 1 capsule by mouth once daily., Disp: , Rfl:     ciclopirox (PENLAC) 8 % Soln, Apply topically nightly., Disp: 6.6 mL, Rfl: 11    cloNIDine (CATAPRES) 0.1 MG tablet, Take 1 tablet (0.1 mg total) by mouth every morning., Disp: 90 tablet, Rfl: 3    clopidogrel (PLAVIX) 75 mg tablet, Take 1 tablet (75 mg total) by mouth once daily., Disp: 90 tablet, Rfl: 3    cyanocobalamin (VITAMIN B-12) 1000 MCG tablet, Take 1,000 mcg by mouth once daily. , Disp: , Rfl:     docusate sodium (COLACE) 100 MG capsule, Take 100 mg by mouth every evening., Disp: , Rfl:     furosemide (LASIX) 40 MG tablet, TAKE ONE TABLET BY MOUTH EVERY MORNING, Disp: 90 tablet, Rfl: 3    IRON 325 mg (65 mg iron) Tab tablet, TAKE ONE TABLET BY MOUTH ONCE DAILY, Disp: 90 tablet, Rfl: 0    KLOR-CON M20 20 mEq tablet, TAKE 1 TABLET BY MOUTH EVERY MONDAY, WEDNESDAY, AND FRIDAY, Disp: 36  tablet, Rfl: 1    lisinopril (PRINIVIL,ZESTRIL) 40 MG tablet, Take 1 tablet (40 mg total) by mouth once daily., Disp: 90 tablet, Rfl: 3    NIFEdipine (PROCARDIA-XL) 60 MG (OSM) 24 hr tablet, TAKE ONE TABLET BY MOUTH ONCE DAILY, Disp: 90 tablet, Rfl: 3    nut.tx,spec.frm,l-fr,iron-fos (TWOCAL HN) 0.08-2 gram-kcal/mL Liqd, Take by mouth every 6 (six) hours., Disp: , Rfl:     pravastatin (PRAVACHOL) 40 MG tablet, Take 1 tablet (40 mg total) by mouth every evening., Disp: 90 tablet, Rfl: 3  No current facility-administered medications for this visit.     Facility-Administered Medications Ordered in Other Visits:     epoetin yamila injection 20,000 Units, 20,000 Units, Subcutaneous, 1 time in Clinic/HOD, Brittnee Shay MD    REVIEW OF SYSTEMS:  GENERAL:  No fever or chills.  Weight stable continued increasing fatigue  HEENT:  No photophobia, rhinorrhea, sinus congestion, tinnitus, gingival   bleeding, oral ulcers, or sore throat.  RESPIRATORY:  No shortness of breath, cough, or wheezing.  GASTROINTESTINAL:  No abdominal pain, dysphagia, emesis, diarrhea, or   constipation.  No heartburn, abdominal distention, melena, or hematochezia.  GENITOURINARY:  No urinary frequency, hesitancy, dysuria, or hematuria.  MUSCULOSKELETAL:  No neck pain, back pain  NEUROLOGICAL:  No headaches,   Localized paresthesias in a wheelchair  PSYCH:  No agitation, change in behavior, or depression.      PHYSICAL EXAMINATION:  BP (!) 119/59   Pulse (!) 49   Temp 97.3 °F (36.3 °C)   Resp 16   Ht 5' (1.524 m)   Wt 48.8 kg (107 lb 9.4 oz)   LMP  (LMP Unknown)   BMI 21.01 kg/m²     Constitutional:  Temporal wasting, patient is in a wheelchair  Head: Normocephalic   Right Ear: External ear normal.   Left Ear: External ear normal.   Nose: Nose normal.   Mouth/Throat: Oropharynx is clear and moist.   Eyes: Conjunctivae and EOM are normal.   Neck:  Rigid yet able to bend  Cardiovascular: Bradycardia  regular rhythm, normal heart sounds    Pulmonary/Chest: Effort normal and breath sounds normal.   Abdominal: Soft.  No hepatosplenomegaly  Musculoskeletal:  Decreased strength, decreasing range of motion in her extremities due to disuse.  + ankle edema   Neurological: alert and oriented to person  Skin: Skin is warm and dry. No rash noted.   Psychiatric: normal mood and affect.    Vitals reviewed.    Fall assessment documented: pt is unable to stand without assistance but it continues to try to do so on her own.  She is post stroke with some neuropathy which also increases her risk of falls.  She is also further weight and should not be left alone to even tried ambulate on her own.  Her daughter and her sister are aware of this.   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    Stage 3 chronic kidney disease    Erythropoietin (EPO) stimulating agent anemia management patient    Type 2 diabetes mellitus with diabetic nephropathy, without long-term current use of insulin    Other osteoporosis without current pathological fracture    Anemia of renal disease  -     CBC auto differential; Standing  -     Iron and TIBC; Future; Expected date: 08/17/2018    Iron deficiency  -     CBC auto differential; Standing  -     Iron and TIBC; Future; Expected date: 08/17/2018    Weight loss    At high risk for falls    Concern about end of life    Other orders  -     epoetin yamila (PROCRIT) injection 20,000 Units; Inject 1 mL (20,000 Units total) into the skin one time.        Proceed with procrit  to help prevent further end-organ damage  Cont HTN meds at dose reduction  Abstain from using any NSAIDS or H 2 blockers which will cause further bone marrow suppression  RTC 3 weeks with cbc and diff , iron to be drawn from hospice   Explained  end of life expectations and prognosis  Bradycardia , increase salt slightly .  I  did readdress prolonging life with continued growth factor support however the patient and family are not ready to alone nature to take its course naturally.  She agrees not to call 911 or proceed to an emergency room in the event of any complications the family is to make the 1st phone call to the hospice agency who has a separate medical director on staff to help guide them  She is to remain on calcium and vitamin D for osteoporosis    Explained the meaning of comfort measures in the family is beginning to understand.  Explained reactivity will consistently started Jayden down and I expect seen she will not want to get out of bed.  She continues to have no diet restrictions.  They not to force feed her.

## 2018-08-24 ENCOUNTER — TELEPHONE (OUTPATIENT)
Dept: HEMATOLOGY/ONCOLOGY | Facility: CLINIC | Age: 83
End: 2018-08-24

## 2018-08-24 NOTE — TELEPHONE ENCOUNTER
Zina given [facility] scheduling number to have patient scheduled for Procrit injection.

## 2018-08-24 NOTE — TELEPHONE ENCOUNTER
----- Message from Sunita Delgado sent at 8/24/2018 11:21 AM CDT -----  Contact: Daughter  Type:  Patient Returning Call    Who Called:  Zina  Who Left Message for Patient:  Unsure  Does the patient know what this is regarding?:  Unsure  Best Call Back Number:  [phone]  Additional Information:

## 2018-08-29 ENCOUNTER — TELEPHONE (OUTPATIENT)
Dept: HEMATOLOGY/ONCOLOGY | Facility: CLINIC | Age: 83
End: 2018-08-29

## 2018-08-29 NOTE — TELEPHONE ENCOUNTER
----- Message from Dunia Meenses sent at 8/29/2018  1:06 PM CDT -----  Contact: pt's daughter/Zina Izaguirre is requesting to speak with a nurse in regards to her mother's injection  Zina would like to know if her mom's lab orders could be sent over to hospice to get her labs done there  Please call to advise  Call back   Thanks

## 2018-08-30 ENCOUNTER — TELEPHONE (OUTPATIENT)
Dept: HEMATOLOGY/ONCOLOGY | Facility: CLINIC | Age: 83
End: 2018-08-30

## 2018-08-30 NOTE — TELEPHONE ENCOUNTER
----- Message from Colin Hunt sent at 8/30/2018  2:14 PM CDT -----  Contact: pt   Pt is calling to see if she can get orders for lab work faxed over to hospice,pt needs some blood work done she states, pls call back and advise  Call Back#[phone]  Thanks

## 2018-08-31 DIAGNOSIS — D50.8 IRON DEFICIENCY ANEMIA SECONDARY TO INADEQUATE DIETARY IRON INTAKE: ICD-10-CM

## 2018-09-03 RX ORDER — FERROUS SULFATE 325(65) MG
TABLET ORAL
Qty: 90 TABLET | Refills: 0 | Status: SHIPPED | OUTPATIENT
Start: 2018-09-03

## 2018-09-07 ENCOUNTER — TELEPHONE (OUTPATIENT)
Dept: HEMATOLOGY/ONCOLOGY | Facility: CLINIC | Age: 83
End: 2018-09-07

## 2018-09-07 NOTE — TELEPHONE ENCOUNTER
----- Message from RT Clifton sent at 9/7/2018  1:06 PM CDT -----  Contact: Zina, Daughter, [phone]   Zina, Daughter, [phone], pt unable to attend appt today at 02:20 pm today due to weather conditions, can the pt's lab test results be discussed with daughter over the phone, thanks.

## 2018-09-07 NOTE — TELEPHONE ENCOUNTER
Daughter notified that we have not received labs yet and that I will call hospice to get the results and let her know the next steps.

## 2018-09-12 ENCOUNTER — TELEPHONE (OUTPATIENT)
Dept: HEMATOLOGY/ONCOLOGY | Facility: CLINIC | Age: 83
End: 2018-09-12

## 2018-09-12 NOTE — TELEPHONE ENCOUNTER
----- Message from Juany Islas sent at 9/12/2018  8:43 AM CDT -----  Contact: daughter  Daughter Erika Orta - [phone] is calling to speak with nurse/when does patient need another appt and another shot?/please call

## 2018-09-26 ENCOUNTER — OFFICE VISIT (OUTPATIENT)
Dept: HEMATOLOGY/ONCOLOGY | Facility: CLINIC | Age: 83
End: 2018-09-26
Payer: MEDICARE

## 2018-09-26 VITALS
TEMPERATURE: 98 F | SYSTOLIC BLOOD PRESSURE: 129 MMHG | DIASTOLIC BLOOD PRESSURE: 59 MMHG | HEIGHT: 60 IN | BODY MASS INDEX: 21.29 KG/M2 | RESPIRATION RATE: 16 BRPM | HEART RATE: 40 BPM | WEIGHT: 108.44 LBS

## 2018-09-26 DIAGNOSIS — R54 FRAIL ELDERLY: ICD-10-CM

## 2018-09-26 DIAGNOSIS — R63.6 UNDERWEIGHT: ICD-10-CM

## 2018-09-26 DIAGNOSIS — D63.1 ANEMIA OF RENAL DISEASE: Primary | ICD-10-CM

## 2018-09-26 DIAGNOSIS — D89.0 POLYCLONAL GAMMOPATHY DETERMINED BY SERUM PROTEIN ELECTROPHORESIS: ICD-10-CM

## 2018-09-26 DIAGNOSIS — N18.9 ANEMIA OF RENAL DISEASE: Primary | ICD-10-CM

## 2018-09-26 PROCEDURE — 99213 OFFICE O/P EST LOW 20 MIN: CPT | Mod: S$PBB,GV,, | Performed by: INTERNAL MEDICINE

## 2018-09-26 PROCEDURE — 99214 OFFICE O/P EST MOD 30 MIN: CPT | Mod: PBBFAC,PO | Performed by: INTERNAL MEDICINE

## 2018-09-26 PROCEDURE — 1101F PT FALLS ASSESS-DOCD LE1/YR: CPT | Mod: CPTII,,, | Performed by: INTERNAL MEDICINE

## 2018-09-26 PROCEDURE — 99999 PR PBB SHADOW E&M-EST. PATIENT-LVL IV: CPT | Mod: PBBFAC,,, | Performed by: INTERNAL MEDICINE

## 2018-09-28 ENCOUNTER — TELEPHONE (OUTPATIENT)
Dept: HEMATOLOGY/ONCOLOGY | Facility: CLINIC | Age: 83
End: 2018-09-28

## 2018-09-28 NOTE — TELEPHONE ENCOUNTER
Message left instructing patient to call [phone] to schedule procrit injection per Dr. Shay.

## 2018-09-28 NOTE — PROGRESS NOTES
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  Patient is now on hospice with.  Over the last week she has become more lethargic.  She remains conversive Ms. able to verbalize that she would like to continue living as long as possible.  Hospice has been very kind and allowing her to continue Procrit injections on an as-needed basis.  Pt here for hyperferritinemia and anemia renal disease and suspected myeloma  SHe does have a history of stroke and carotid artery stenosis.   She is tolerating meds lisinopril and Procardia for HTN.  She has continued pravachol for dyslipidemia    Here for lab results but labs were not drawn       Current Outpatient Medications:     aspirin (ECOTRIN) 81 MG EC tablet, Take 81 mg by mouth once daily., Disp: , Rfl:     C,E,zinc,copper 11-omega3s-lut (OCUVITE ADULT 50 PLUS) 250-5-1 mg Cap, Take 1 tablet by mouth once daily., Disp: , Rfl:     cholecalciferol, vitamin D3, (VITAMIN D3) 2,000 unit Cap, Take 1 capsule by mouth once daily., Disp: , Rfl:     ciclopirox (PENLAC) 8 % Soln, Apply topically nightly., Disp: 6.6 mL, Rfl: 11    cloNIDine (CATAPRES) 0.1 MG tablet, Take 1 tablet (0.1 mg total) by mouth every morning., Disp: 90 tablet, Rfl: 3    clopidogrel (PLAVIX) 75 mg tablet, Take 1 tablet (75 mg total) by mouth once daily., Disp: 90 tablet, Rfl: 3    cyanocobalamin (VITAMIN B-12) 1000 MCG tablet, Take 1,000 mcg by mouth once daily. , Disp: , Rfl:     docusate sodium (COLACE) 100 MG capsule, Take 100 mg by mouth every evening., Disp: , Rfl:     ferrous sulfate 325 mg (65 mg iron) Tab tablet, TAKE 1 TABLET BY MOUTH ONCE DAILY, Disp: 90 tablet, Rfl: 0    furosemide (LASIX) 40 MG tablet, TAKE ONE TABLET BY MOUTH EVERY MORNING, Disp: 90 tablet, Rfl: 3    KLOR-CON M20 20 mEq tablet, TAKE 1  TABLET BY MOUTH EVERY MONDAY, WEDNESDAY, AND FRIDAY, Disp: 36 tablet, Rfl: 1    lisinopril (PRINIVIL,ZESTRIL) 40 MG tablet, Take 1 tablet (40 mg total) by mouth once daily., Disp: 90 tablet, Rfl: 3    NIFEdipine (PROCARDIA-XL) 60 MG (OSM) 24 hr tablet, TAKE ONE TABLET BY MOUTH ONCE DAILY, Disp: 90 tablet, Rfl: 3    nut.tx,spec.frm,l-fr,iron-fos (TWOCAL HN) 0.08-2 gram-kcal/mL Liqd, Take by mouth every 6 (six) hours., Disp: , Rfl:     pravastatin (PRAVACHOL) 40 MG tablet, Take 1 tablet (40 mg total) by mouth every evening., Disp: 90 tablet, Rfl: 3  No current facility-administered medications for this visit.     Facility-Administered Medications Ordered in Other Visits:     epoetin yamila injection 20,000 Units, 20,000 Units, Subcutaneous, 1 time in Clinic/HOD, Brittnee Shay MD    REVIEW OF SYSTEMS:  GENERAL:  No fever or chills.  Weight stable continued increasing fatigue  HEENT:  No photophobia, rhinorrhea, sinus congestion, tinnitus, gingival   bleeding, oral ulcers, or sore throat.  RESPIRATORY:  No shortness of breath, cough, or wheezing.  GASTROINTESTINAL:  No abdominal pain, dysphagia, emesis, diarrhea, or   constipation.  No heartburn, abdominal distention, melena, or hematochezia.  GENITOURINARY:  No urinary frequency, hesitancy, dysuria, or hematuria.  MUSCULOSKELETAL:  No neck pain, back pain  NEUROLOGICAL:  No headaches,   Localized paresthesias in a wheelchair  PSYCH:  No agitation, change in behavior, or depression.      PHYSICAL EXAMINATION:  BP (!) 129/59 (BP Location: Left arm, Patient Position: Sitting, BP Method: Medium (Automatic))   Pulse (!) 40   Temp 97.5 °F (36.4 °C)   Resp 16   Ht 5' (1.524 m)   Wt 49.2 kg (108 lb 7.5 oz)   LMP  (LMP Unknown)   BMI 21.18 kg/m²     Constitutional:  Temporal wasting, patient is in a wheelchair  Head: Normocephalic   Right Ear: External ear normal.   Left Ear: External ear normal.   Nose: Nose normal.   Mouth/Throat: Oropharynx is clear and moist.    Eyes: Conjunctivae and EOM are normal.   Neck:  Rigid yet able to bend  Cardiovascular: Bradycardia  regular rhythm, normal heart sounds   Pulmonary/Chest: Effort normal and breath sounds normal.   Abdominal: Soft.  No hepatosplenomegaly  Musculoskeletal:  Decreased strength, decreasing range of motion in her extremities due to disuse.  + ankle edema   Neurological: alert and oriented to person  Skin: Skin is warm and dry. No rash noted.   Psychiatric: normal mood and affect.    Vitals reviewed.    Fall assessment documented: pt is unable to stand without assistance but it continues to try to do so on her own.  She is post stroke with some neuropathy which also increases her risk of falls.  She is also further weight and should not be left alone to even tried ambulate on her own.  Her daughter and her sister are aware of this.   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  She agrees not to call 911 or proceed to an emergency room in the event of any complications the family is to make the 1st phone call to the hospice agency who has a separate medical director on staff to help guide them  She is to remain on calcium and vitamin D for osteoporosis    Explained the meaning of comfort measures in the family is beginning to understand.  Explained reactivity will  consistently started Jayden down and I expect seen she will not want to get out of bed.  She continues to have no diet restrictions.  They not to force feed her.

## 2018-10-04 ENCOUNTER — TELEPHONE (OUTPATIENT)
Dept: HEMATOLOGY/ONCOLOGY | Facility: CLINIC | Age: 83
End: 2018-10-04

## 2018-10-05 ENCOUNTER — TELEPHONE (OUTPATIENT)
Dept: HEMATOLOGY/ONCOLOGY | Facility: CLINIC | Age: 83
End: 2018-10-05

## 2018-10-05 NOTE — TELEPHONE ENCOUNTER
----- Message from Zara Campa sent at 10/5/2018  3:24 PM CDT -----  Contact: Jeanette/Zina  This went to incorrect box.     Thanks.  ----- Message -----  From: Juan Miguel Quiñones  Sent: 10/5/2018   1:02 PM  To: Kat Carmen' Staff    Zina called in regarding the attached patient (mom) and stated instead of sending the Warfarin injection to [facility], Zina would like patients home care nurse to give it to patient.    Zina's call back number is [phone]

## 2018-10-09 ENCOUNTER — TELEPHONE (OUTPATIENT)
Dept: HEMATOLOGY/ONCOLOGY | Facility: CLINIC | Age: 83
End: 2018-10-09

## 2018-10-10 NOTE — TELEPHONE ENCOUNTER
Spoke with pt's daughter, Zina. Informed Zina that pt's insurance is not paying for pt to receive procrit injections in infusion any longer as long as she is on hospice, which Zina confirmed pt was. Zina informed that Dr. Shay can see pt however, procrit cannot be ordered nor given in infusion; this now has to be ordered & given through pt's hospice company. Zina states she is going to call hospice to discuss this with them then will call clinic back for appointment if it is still needed.

## 2018-11-24 ENCOUNTER — HOSPITAL ENCOUNTER (OUTPATIENT)
Facility: HOSPITAL | Age: 83
Discharge: HOSPICE | End: 2018-11-26
Attending: EMERGENCY MEDICINE | Admitting: INTERNAL MEDICINE
Payer: MEDICARE

## 2018-11-24 DIAGNOSIS — S32.502A CLOSED FRACTURE OF LEFT PUBIS, UNSPECIFIED PORTION OF PUBIS, INITIAL ENCOUNTER: ICD-10-CM

## 2018-11-24 DIAGNOSIS — S32.592A OTHER CLOSED FRACTURE OF LEFT PUBIS, INITIAL ENCOUNTER: Primary | ICD-10-CM

## 2018-11-24 DIAGNOSIS — R26.81 UNSTEADY GAIT: ICD-10-CM

## 2018-11-24 DIAGNOSIS — R29.6 FREQUENT FALLS: ICD-10-CM

## 2018-11-24 LAB
ANION GAP SERPL CALC-SCNC: 9 MMOL/L
BASOPHILS # BLD AUTO: 0 K/UL
BASOPHILS NFR BLD: 0.2 %
BUN SERPL-MCNC: 39 MG/DL
CALCIUM SERPL-MCNC: 9.3 MG/DL
CHLORIDE SERPL-SCNC: 112 MMOL/L
CO2 SERPL-SCNC: 21 MMOL/L
CREAT SERPL-MCNC: 1.2 MG/DL
DIFFERENTIAL METHOD: ABNORMAL
EOSINOPHIL # BLD AUTO: 0.1 K/UL
EOSINOPHIL NFR BLD: 1.1 %
ERYTHROCYTE [DISTWIDTH] IN BLOOD BY AUTOMATED COUNT: 17.3 %
EST. GFR  (AFRICAN AMERICAN): 45 ML/MIN/1.73 M^2
EST. GFR  (NON AFRICAN AMERICAN): 39 ML/MIN/1.73 M^2
GLUCOSE SERPL-MCNC: 147 MG/DL
HCT VFR BLD AUTO: 27.3 %
HGB BLD-MCNC: 9 G/DL
LYMPHOCYTES # BLD AUTO: 1.6 K/UL
LYMPHOCYTES NFR BLD: 27.3 %
MCH RBC QN AUTO: 33 PG
MCHC RBC AUTO-ENTMCNC: 32.9 G/DL
MCV RBC AUTO: 101 FL
MONOCYTES # BLD AUTO: 0.6 K/UL
MONOCYTES NFR BLD: 9.7 %
NEUTROPHILS # BLD AUTO: 3.6 K/UL
NEUTROPHILS NFR BLD: 61.7 %
PLATELET # BLD AUTO: 150 K/UL
PMV BLD AUTO: 10.1 FL
POTASSIUM SERPL-SCNC: 3.9 MMOL/L
RBC # BLD AUTO: 2.71 M/UL
SODIUM SERPL-SCNC: 142 MMOL/L
WBC # BLD AUTO: 5.8 K/UL

## 2018-11-24 PROCEDURE — 25000003 PHARM REV CODE 250: Performed by: EMERGENCY MEDICINE

## 2018-11-24 PROCEDURE — G0378 HOSPITAL OBSERVATION PER HR: HCPCS

## 2018-11-24 PROCEDURE — 63600175 PHARM REV CODE 636 W HCPCS: Performed by: INTERNAL MEDICINE

## 2018-11-24 PROCEDURE — 99285 EMERGENCY DEPT VISIT HI MDM: CPT | Mod: 25

## 2018-11-24 PROCEDURE — 80048 BASIC METABOLIC PNL TOTAL CA: CPT

## 2018-11-24 PROCEDURE — 85025 COMPLETE CBC W/AUTO DIFF WBC: CPT

## 2018-11-24 PROCEDURE — 36415 COLL VENOUS BLD VENIPUNCTURE: CPT

## 2018-11-24 PROCEDURE — 96374 THER/PROPH/DIAG INJ IV PUSH: CPT

## 2018-11-24 RX ORDER — SODIUM CHLORIDE 9 MG/ML
INJECTION, SOLUTION INTRAVENOUS CONTINUOUS
Status: DISCONTINUED | OUTPATIENT
Start: 2018-11-25 | End: 2018-11-26 | Stop reason: HOSPADM

## 2018-11-24 RX ORDER — PRAVASTATIN SODIUM 40 MG/1
40 TABLET ORAL NIGHTLY
Status: DISCONTINUED | OUTPATIENT
Start: 2018-11-24 | End: 2018-11-25

## 2018-11-24 RX ORDER — ACETAMINOPHEN 325 MG/1
650 TABLET ORAL EVERY 6 HOURS PRN
Status: DISCONTINUED | OUTPATIENT
Start: 2018-11-24 | End: 2018-11-25

## 2018-11-24 RX ORDER — HYDRALAZINE HYDROCHLORIDE 20 MG/ML
20 INJECTION INTRAMUSCULAR; INTRAVENOUS EVERY 6 HOURS PRN
Status: DISCONTINUED | OUTPATIENT
Start: 2018-11-24 | End: 2018-11-26 | Stop reason: HOSPADM

## 2018-11-24 RX ORDER — MORPHINE SULFATE 4 MG/ML
4 INJECTION, SOLUTION INTRAMUSCULAR; INTRAVENOUS EVERY 4 HOURS PRN
Status: DISCONTINUED | OUTPATIENT
Start: 2018-11-24 | End: 2018-11-25

## 2018-11-24 RX ORDER — NIFEDIPINE 30 MG/1
60 TABLET, EXTENDED RELEASE ORAL DAILY
Status: DISCONTINUED | OUTPATIENT
Start: 2018-11-25 | End: 2018-11-26 | Stop reason: HOSPADM

## 2018-11-24 RX ORDER — LISINOPRIL 40 MG/1
40 TABLET ORAL DAILY
Status: DISCONTINUED | OUTPATIENT
Start: 2018-11-25 | End: 2018-11-26 | Stop reason: HOSPADM

## 2018-11-24 RX ORDER — FUROSEMIDE 40 MG/1
40 TABLET ORAL EVERY MORNING
Status: DISCONTINUED | OUTPATIENT
Start: 2018-11-25 | End: 2018-11-25

## 2018-11-24 RX ORDER — ASPIRIN 81 MG/1
81 TABLET ORAL DAILY
Status: DISCONTINUED | OUTPATIENT
Start: 2018-11-25 | End: 2018-11-26 | Stop reason: HOSPADM

## 2018-11-24 RX ORDER — CLONIDINE HYDROCHLORIDE 0.1 MG/1
0.1 TABLET ORAL EVERY MORNING
Status: DISCONTINUED | OUTPATIENT
Start: 2018-11-25 | End: 2018-11-26 | Stop reason: HOSPADM

## 2018-11-24 RX ORDER — DOCUSATE SODIUM 100 MG/1
100 CAPSULE, LIQUID FILLED ORAL NIGHTLY
Status: DISCONTINUED | OUTPATIENT
Start: 2018-11-24 | End: 2018-11-26 | Stop reason: HOSPADM

## 2018-11-24 RX ORDER — CLOPIDOGREL BISULFATE 75 MG/1
75 TABLET ORAL DAILY
Status: DISCONTINUED | OUTPATIENT
Start: 2018-11-25 | End: 2018-11-25

## 2018-11-24 RX ADMIN — SODIUM CHLORIDE: 0.9 INJECTION, SOLUTION INTRAVENOUS at 11:11

## 2018-11-24 RX ADMIN — HYDRALAZINE HYDROCHLORIDE 20 MG: 20 INJECTION INTRAMUSCULAR; INTRAVENOUS at 06:11

## 2018-11-24 RX ADMIN — DOCUSATE SODIUM 100 MG: 100 CAPSULE, LIQUID FILLED ORAL at 08:11

## 2018-11-24 RX ADMIN — PRAVASTATIN SODIUM 40 MG: 40 TABLET ORAL at 08:11

## 2018-11-24 NOTE — ED PROVIDER NOTES
Encounter Date: 11/24/2018    SCRIBE #1 NOTE: I, Mary Doyle, am scribing for, and in the presence of, Dr. Lul Whipple.       History     Chief Complaint   Patient presents with    Hip Pain     left       Time seen by provider: 3:20 PM on 11/24/2018    Jaqui Odell is a 89 y.o. female with DM type II, HLD, stroke, and carotid artery occlusion who presents to the ED via EMS with an onset of left hip pain that began prior to arriving to the ED after sustaining an unwitnessed fall. EMS gave her 50 mg of Fentanyl. The patient denies any other symptoms at this time. Pt's SHx cardiac catheterization and coronary angioplasty. No known drug allergies noted.      The history is provided by the EMS personnel. The history is limited by the condition of the patient.     Review of patient's allergies indicates:   Allergen Reactions    No known drug allergies      Past Medical History:   Diagnosis Date    Callus     Carotid artery occlusion     Diabetes mellitus     Diabetes mellitus, type 2     Hyperlipidemia     Hypertension     Stroke     Syncope and collapse      Past Surgical History:   Procedure Laterality Date    adrenal insufficiency      ANGIOGRAM-CAROTID Bilateral 10/5/2015    Performed by [facility] at [facility] CATH LAB    CARDIAC CATHETERIZATION      CORONARY ANGIOPLASTY      HYSTERECTOMY       No family history on file.  Social History     Tobacco Use    Smoking status: Never Smoker    Smokeless tobacco: Never Used   Substance Use Topics    Alcohol use: No     Alcohol/week: 0.0 oz    Drug use: No     Review of Systems   Unable to perform ROS: Acuity of condition   Musculoskeletal: Positive for arthralgias.        Positive for left hip pain.       Physical Exam     Initial Vitals [11/24/18 1514]   BP Pulse Resp Temp SpO2   (!) 205/88 101 20 98.6 °F (37 °C) 100 %      MAP       --         Physical Exam    Nursing note and vitals reviewed.  Constitutional: She appears  well-developed and well-nourished. She is not diaphoretic. No distress.   HENT:   Head: Normocephalic and atraumatic.   Mouth/Throat: Oropharynx is clear and moist.   Eyes: Conjunctivae are normal.   Neck: Neck supple.   Cardiovascular: Normal rate, regular rhythm and intact distal pulses. Exam reveals no gallop and no friction rub.    Murmur heard.   Systolic murmur is present with a grade of 3/6.  Pulses:       Dorsalis pedis pulses are 1+ on the right side, and 1+ on the left side.   No palpable PT pulses.   Pulmonary/Chest: Breath sounds normal. She has no wheezes. She has no rhonchi. She has no rales.   Abdominal: Soft. She exhibits no distension. There is no tenderness.   Musculoskeletal:        Left hip: She exhibits decreased range of motion.        Cervical back: She exhibits no pain.   Rigidity of left ankle. Resists motion of left ankle joint secondary to pain. Limited ROM of left hip. Pain of the anterolateral aspect of the left hip with palpations.    Neurological: She is alert and oriented to person, place, and time. GCS eye subscore is 4. GCS verbal subscore is 4. GCS motor subscore is 6.   Moving left side of the body less than right. Follows commands. Decreased movement of left upper and lower extremities. Mental status is limited.    Skin: Capillary refill takes less than 2 seconds. No rash noted. No erythema.   Psychiatric: Her speech is normal.   Some speech and words.          ED Course   Procedures  Labs Reviewed   CBC W/ AUTO DIFFERENTIAL - Abnormal; Notable for the following components:       Result Value    RBC 2.71 (*)     Hemoglobin 9.0 (*)     Hematocrit 27.3 (*)      (*)     MCH 33.0 (*)     RDW 17.3 (*)     All other components within normal limits   BASIC METABOLIC PANEL - Abnormal; Notable for the following components:    Chloride 112 (*)     CO2 21 (*)     Glucose 147 (*)     BUN, Bld 39 (*)     eGFR if  45 (*)     eGFR if non  39 (*)     All  other components within normal limits          Imaging Results          CT Hip Without Contrast Left (Final result)  Result time 11/24/18 16:55:16    Final result by Lul Waller MD (11/24/18 16:55:16)                 Impression:      Left obturator ring fracture, mildly displaced.  Fracture plane extends near but does not clearly involve the acetabulum.      Electronically signed by: Lul Waller  Date:    11/24/2018  Time:    16:55             Narrative:    EXAMINATION:  CT HIP WITHOUT CONTRAST LEFT    CLINICAL HISTORY:  Hip pain, acute, fx suspect, xray negative or indeterminate;    TECHNIQUE:  Transaxial images through the left hip without the use of intravenous contrast.  Multiplanar reformats.    COMPARISON:  None    FINDINGS:  There is a mildly displaced fracture through the left inferior pubic ramus.  There is a minimally displaced fracture through the left pubic root.  Fracture plane is near the acetabulum along the quadrilateral plate as can be seen series 601, image 36.  Osteopenia.  Degenerative changes mild at the left hip joint moderate at the left SI joint.  Chondrocalcinosis.  Partial ankylosis of the pubic symphysis.    Atherosclerosis.  Distended urinary bladder.  Hysterectomy.  Lower lumbar degenerative changes                               CT Cervical Spine Without Contrast (Final result)  Result time 11/24/18 16:00:46    Final result by Lul Waller MD (11/24/18 16:00:46)                 Impression:      1. Malalignment as described.  No acute fracture.  2. Multilevel degenerative changes with areas of bony spinal canal and neural foraminal narrowing as described.      Electronically signed by: Lul Waller  Date:    11/24/2018  Time:    16:00             Narrative:    EXAMINATION:  CT CERVICAL SPINE WITHOUT CONTRAST    CLINICAL HISTORY:  C-spine trauma, NEXUS/CCR positive, low risk;Fall, r/o cervical vertebral injury;    TECHNIQUE:  Low dose axial images, sagittal and  coronal reformations were performed though the cervical spine.  Contrast was not administered.    COMPARISON:  None    FINDINGS:  There is reversal of normal cervical lordosis.  2 mm anterolisthesis C3 on C4.  2 mm anterolisthesis C7 on T1. Fusion posterior elements C2 and C3 on the left.  Fusion posterior elements C7 and T1 on the right.  No acute fracture.  Preserved vertebral body heights.    Mild degenerative change at the C1-2 articulation.  Degenerative disc disease severe at C4-5 and moderate at C3-4 and C5-6.  Posterior disc osteophyte complex at C4-5 through C6-7 contribute to bony spinal canal stenosis.  Bony spinal canal stenosis is worst at C4-5.  Uncovertebral spur and facet degenerative changes contribute to bony neural foraminal narrowing on the right from C3-4 through C7-T1 and on the left at C3-4 through C6-7. Atherosclerosis.                               CT Head Without Contrast (Final result)  Result time 11/24/18 16:03:15    Final result by Lul Waller MD (11/24/18 16:03:15)                 Impression:      No acute intracranial abnormality.  Moderate involutional change and white matter disease.      Electronically signed by: Lul Waller  Date:    11/24/2018  Time:    16:03             Narrative:    EXAMINATION:  CT HEAD WITHOUT CONTRAST    CLINICAL HISTORY:  Fall, r/o ICH, confusion at baseline;    TECHNIQUE:  Low dose axial images were obtained through the head.  Coronal and sagittal reformations were also performed. Contrast was not administered.    COMPARISON:  None.    FINDINGS:  There are involutional changes with normal size of the ventricular system. Periventricular white matter hypoattenuation is nonspecific but suggestive of chronic microvascular ischemic change.  No mass, hemorrhage or acute major vascular distribution infarct. No mass effect or midline shift. Surgical changes of the globes.   Paranasal sinuses and mastoid air cells are clear. Atherosclerosis.  No acute  osseous abnormality.                               X-Ray Hip 2 or 3 views Left (Final result)  Result time 11/24/18 15:47:50    Final result by Lul Waller MD (11/24/18 15:47:50)                 Impression:      Concern for a nondisplaced left acetabular fracture.      Electronically signed by: Lul Waller  Date:    11/24/2018  Time:    15:47             Narrative:    EXAMINATION:  XR HIP 2 VIEW LEFT    CLINICAL HISTORY:  Left hip pain;    TECHNIQUE:  AP view of the pelvis and frog leg lateral view of the left hip were performed.    COMPARISON:  None    FINDINGS:  Cortical step-off along the medial left acetabular wall.  No malalignment.  Partially imaged degenerative change lower lumbar spine.  At least mild degenerative change of both SI joints and hip joints.  Osteopenia.  Atherosclerosis.                            (rad read, XR visualized by me)     Medical Decision Making:   History:   Old Medical Records: I decided to obtain old medical records.  Clinical Tests:   Lab Tests: Ordered and Reviewed  Radiological Study: Ordered and Reviewed            Scribe Attestation:   Scribe #1: I performed the above scribed service and the documentation accurately describes the services I performed. I attest to the accuracy of the note.    I, Dr. Lul Whipple, personally performed the services described in this documentation. All medical record entries made by the scribe were at my direction and in my presence.  I have reviewed the chart and agree that the record reflects my personal performance and is accurate and complete. Lul Whipple MD.  7:06 PM 11/24/2018    Jaqui Odell is a 89 y.o. female presenting with left hip pain consistent with pubic ramus fracture.  Patient has marginal ambulatory status at baseline.  She is on hospice for multiple medical issues and ambulates with a walker with left-sided hemiparesis from remote stroke.  I will admit for pain control and PT assessment here.  I did  discuss with Dr. Delacruz from Orthopedic surgery who says she may weight bear as tolerated with no immediate surgery anticipated at this point.  No sign of other major injury on evaluation.  No other acute precipitating medical process requiring further emergent treatment here.  Have discussed with family wish to keep the patient as comfortable as possible in keeping with her hospice diagnosis.        ED Course as of Nov 24 1742   Sat Nov 24, 2018   1714 D/w Dr. Delacruz injury with clinical and CT findings. She may weightbear as tolerated.    [MR]      ED Course User Index  [MR] Lul Whipple MD     Clinical Impression:   The encounter diagnosis was Other closed fracture of left pubis, initial encounter.      Disposition:   Disposition: Admitted                        Lul Whipple MD  11/24/18 8642

## 2018-11-25 PROCEDURE — 25000003 PHARM REV CODE 250: Performed by: NURSE PRACTITIONER

## 2018-11-25 PROCEDURE — G8979 MOBILITY GOAL STATUS: HCPCS | Mod: CK | Performed by: PHYSICAL THERAPIST

## 2018-11-25 PROCEDURE — 25000003 PHARM REV CODE 250: Performed by: EMERGENCY MEDICINE

## 2018-11-25 PROCEDURE — 63600175 PHARM REV CODE 636 W HCPCS: Performed by: INTERNAL MEDICINE

## 2018-11-25 PROCEDURE — G0378 HOSPITAL OBSERVATION PER HR: HCPCS

## 2018-11-25 PROCEDURE — 25000003 PHARM REV CODE 250: Performed by: INTERNAL MEDICINE

## 2018-11-25 PROCEDURE — 97162 PT EVAL MOD COMPLEX 30 MIN: CPT | Performed by: PHYSICAL THERAPIST

## 2018-11-25 PROCEDURE — G8978 MOBILITY CURRENT STATUS: HCPCS | Mod: CM | Performed by: PHYSICAL THERAPIST

## 2018-11-25 PROCEDURE — 97530 THERAPEUTIC ACTIVITIES: CPT | Performed by: PHYSICAL THERAPIST

## 2018-11-25 RX ORDER — HYDROCODONE BITARTRATE AND ACETAMINOPHEN 5; 325 MG/1; MG/1
1 TABLET ORAL EVERY 6 HOURS PRN
Status: DISCONTINUED | OUTPATIENT
Start: 2018-11-25 | End: 2018-11-25

## 2018-11-25 RX ORDER — ENOXAPARIN SODIUM 100 MG/ML
30 INJECTION SUBCUTANEOUS EVERY 24 HOURS
Status: DISCONTINUED | OUTPATIENT
Start: 2018-11-25 | End: 2018-11-26 | Stop reason: HOSPADM

## 2018-11-25 RX ORDER — ACETAMINOPHEN 500 MG
1000 TABLET ORAL 3 TIMES DAILY
Status: DISCONTINUED | OUTPATIENT
Start: 2018-11-25 | End: 2018-11-26 | Stop reason: HOSPADM

## 2018-11-25 RX ORDER — OXYCODONE HYDROCHLORIDE 5 MG/1
5 TABLET ORAL EVERY 6 HOURS PRN
Status: DISCONTINUED | OUTPATIENT
Start: 2018-11-25 | End: 2018-11-26 | Stop reason: HOSPADM

## 2018-11-25 RX ORDER — MORPHINE SULFATE 2 MG/ML
0.5 INJECTION, SOLUTION INTRAMUSCULAR; INTRAVENOUS EVERY 4 HOURS PRN
Status: DISCONTINUED | OUTPATIENT
Start: 2018-11-25 | End: 2018-11-26 | Stop reason: HOSPADM

## 2018-11-25 RX ADMIN — ACETAMINOPHEN 1000 MG: 500 TABLET ORAL at 08:11

## 2018-11-25 RX ADMIN — LISINOPRIL 40 MG: 40 TABLET ORAL at 08:11

## 2018-11-25 RX ADMIN — HYDROCODONE BITARTRATE AND ACETAMINOPHEN 1 TABLET: 5; 325 TABLET ORAL at 07:11

## 2018-11-25 RX ADMIN — FUROSEMIDE 40 MG: 40 TABLET ORAL at 07:11

## 2018-11-25 RX ADMIN — DOCUSATE SODIUM 100 MG: 100 CAPSULE, LIQUID FILLED ORAL at 08:11

## 2018-11-25 RX ADMIN — CLOPIDOGREL BISULFATE 75 MG: 75 TABLET ORAL at 08:11

## 2018-11-25 RX ADMIN — ACETAMINOPHEN 1000 MG: 500 TABLET ORAL at 02:11

## 2018-11-25 RX ADMIN — NIFEDIPINE 60 MG: 30 TABLET, FILM COATED, EXTENDED RELEASE ORAL at 08:11

## 2018-11-25 RX ADMIN — ENOXAPARIN SODIUM 30 MG: 100 INJECTION SUBCUTANEOUS at 06:11

## 2018-11-25 RX ADMIN — ASPIRIN 81 MG: 81 TABLET, COATED ORAL at 08:11

## 2018-11-25 RX ADMIN — CLONIDINE HYDROCHLORIDE 0.1 MG: 0.1 TABLET ORAL at 07:11

## 2018-11-25 NOTE — PT/OT/SLP EVAL
Physical Therapy Evaluation    Patient Name:  Jaqui Odell   MRN:  [id number]    Recommendations:     Discharge Recommendations:  Pt will need 24/7 assistance at home vs at SNF.  She has a Hospice CNA that comes 2x/wk already.   Discharge Equipment Recommendations: hospital bed, lift device if they decide to take the pt home.   Barriers to discharge: There are ~4 steps to go up into her home.  Dtr states they will be starting to build a ramp soon.      Assessment:     Jaqui Odell is a 89 y.o. female admitted with a medical diagnosis of Closed fracture of left pubis.  She presents with the following impairments/functional limitations:  weakness, impaired self care skills, impaired balance, decreased coordination, decreased safety awareness, impaired functional mobilty, pain, impaired cognition, decreased lower extremity function, gait instability.  During PT evaluation, she does not answer any questions.  All PLOF information is obtained from the dtr that lives with her and assists her at home.  Pt required Total A for transfer supine<>sit with extensive time, HOB raised, and assist from pt's family to get pt to participate.  Once sitting on the EOB, she demonstrates significant posterior lean.  Pt attempted to stand x 2 trials with RW, but she is not able to lift her buttocks up off the bed.  Will place pt on a trial of PT for 3 visits because she does not follow commands well.  Upon d/c, she will need 24/7 assistance at home vs SNF.  She already had a hospice CNA that would assist her with ADL's 2days per week.         Rehab Prognosis:  poor; patient would benefit from acute skilled PT services to address these deficits and reach maximum level of function.      Recent Surgery: * No surgery found *      Plan:     During this hospitalization, patient to be seen (Trial of PT x 3 visits) to address the above listed problems via gait training, therapeutic activities, therapeutic exercises  · Plan of Care Expires:   12/09/18   Plan of Care Reviewed with: patient, daughter, family    Subjective     Communicated with MAGDALENA Kraus prior to session.  Patient found supine in bed upon PT entry to room, agreeable to evaluation.  Dtr present, but she left during PT evaluation, and pt's granddaughter and son arrived who assisted with encouraging pt to mobilize with PT.     Chief Complaint: Pt only groans during tx.   Patient comments/goals: none stated  Pain/Comfort:  · Pain Rating 1: (Pt is unable to express where pain is, but grimaces and shouts out during mobility. )  · Pain Addressed 1: Reposition, Distraction    Patients cultural, spiritual, Gnosticism conflicts given the current situation: pt states none    Living Environment:  Pt lives with her dtr in a 1 level home with 4 steps to enter.  She normally is able to ambulate with her RW and supervision, and negotiate the steps in/out of her home with 2 person assistance.  She requires assistance with dressing, as well as bathing, and has a hospice CNA that helps her 2x/wk for bathing.  Patient has the following equipment: walker, rolling, wheelchair, bedside commode, shower chair(dtr states that the shower chair is broken).  DME owned (not currently used): none.  Upon discharge, patient will have supervision from dtr, but she will need 24/7 assistance at home vs SNF.    Objective:     Patient found with: peripheral IV, oxygen, telemetry     General Precautions: Standard, diabetic, fall   Orthopedic Precautions:N/A   Braces: N/A     Exams:  · Cognitive Exam:  Patient is oriented to Person  · Postural Exam:  Patient presented with the following abnormalities:    · -       Rounded shoulders  · -       Forward head  · RLE ROM: Deficits: ROM is limited due to pain  · RLE Strength: Deficits: Grossly 2/5  · LLE ROM: Deficits: ROM is limited due to pain  · LLE Strength: Deficits: grossly 2/5    Functional Mobility:  · Bed Mobility:     · Scooting: total assistance and of 2 persons  · Supine  to Sit: total assistance, of 2 persons and HOB completely raised with use of bed pad to assist pt's hips to the EOB.  Strong posterior lean once pt is sitting on the EOB.   · Sit to Supine: total assistance and of 2 persons   · Sit<>stand: attempted x 2 trials with RW, however pt is not able to raise buttocks up of the bed.     AM-PAC 6 CLICK MOBILITY  Total Score:8       Therapeutic Activities and Exercises:   Pt requires an extensive amount of increased time to perform all functional mobility tasks.  She is resistive to mobility due to increased pain.  She requires Mod A for static sitting on the EOB due to strong posterior lean.      Patient left HOB elevated with all lines intact, call button in reach, RN Nayana notified and family present.    GOALS:   Multidisciplinary Problems     Physical Therapy Goals        Problem: Physical Therapy Goal    Goal Priority Disciplines Outcome Goal Variances Interventions   Physical Therapy Goal     PT, PT/OT Ongoing (interventions implemented as appropriate)     Description:  Goals to be met by: 2018     Patient will increase functional independence with mobility by performin. Supine to sit with MInimal Assistance  2. Sit to supine with MInimal Assistance  3. Sit to stand transfer with Moderate Assistance  4. Bed to chair transfer with Moderate Assistance using Rolling Walker  5. Lower extremity exercise program x10 reps per handout, with assistance as needed                      History:     Past Medical History:   Diagnosis Date    Callus     Carotid artery occlusion     Diabetes mellitus     Diabetes mellitus, type 2     Hyperlipidemia     Hypertension     Stroke     Syncope and collapse        Past Surgical History:   Procedure Laterality Date    adrenal insufficiency      ANGIOGRAM-CAROTID Bilateral 10/5/2015    [facility] CATH LAB    CARDIAC CATHETERIZATION      CORONARY ANGIOPLASTY      HYSTERECTOMY          Clinical Decision Making:     History  Co-morbidities and personal factors that may impact the plan of care Examination  Body Structures and Functions, activity limitations and participation restrictions that may impact the plan of care Clinical Presentation   Decision Making/ Complexity Score   Co-morbidities:   [] Time since onset of injury / illness / exacerbation  [] Status of current condition  []Patient's cognitive status and safety concerns    [] Multiple Medical Problems (see med hx)  Personal Factors:   [] Patient's age  [] Prior Level of function   [] Patient's home situation (environment and family support)  [] Patient's level of motivation  [] Expected progression of patient      HISTORY:(criteria)    [] 82486 - no personal factors/history    [] 30734 - has 1-2 personal factor/comorbidity     [] 63209 - has >3 personal factor/comorbidity     Body Regions:  [] Objective examination findings  [] Head     []  Neck  [] Trunk   [] Upper Extremity  [] Lower Extremity    Body Systems:  [] For communication ability, affect, cognition, language, and learning style: the assessment of the ability to make needs known, consciousness, orientation (person, place, and time), expected emotional /behavioral responses, and learning preferences (eg, learning barriers, education  needs)  [] For the neuromuscular system: a general assessment of gross coordinated movement (eg, balance, gait, locomotion, transfers, and transitions) and motor function  (motor control and motor learning)  [] For the musculoskeletal system: the assessment of gross symmetry, gross range of motion, gross strength, height, and weight  [] For the integumentary system: the assessment of pliability(texture), presence of scar formation, skin color, and skin integrity  [] For cardiovascular/pulmonary system: the assessment of heart rate, respiratory rate, blood pressure, and edema     Activity limitations:    [] Patient's cognitive status and saf  ety concerns          [] Status of current condition      [] Weight bearing restriction  [] Cardiopulmunary Restriction    Participation Restrictions:   [] Goals and goal agreement with the patient     [] Rehab potential (prognosis) and probable outcome      Examination of Body System: (criteria)    [] 32724 - addressing 1-2 elements    [] 22663 - addressing a total of 3 or more elements     [] 83567 -  Addressing a total of 4 or more elements         Clinical Presentation: (criteria)  Choose one     On examination of body system using standardized tests and measures patient presents with (CHOOSE ONE) elements from any of the following: body structures and functions, activity limitations, and/or participation restrictions.  Leading to a clinical presentation that is considered (CHOOSE ONE)                              Clinical Decision Making  (Eval Complexity):  Choose One     Time Tracking:     PT Received On: 11/25/18  PT Start Time: 1014     PT Stop Time: 1044  PT Total Time (min): 30 min     Billable Minutes: Evaluation 15 and Therapeutic Activity 15      Libby Gao, PT  11/25/2018

## 2018-11-25 NOTE — PLAN OF CARE
Problem: Patient Care Overview  Goal: Plan of Care Review  Pt. only complains of pain when moved in bed. Daughter staying night.IV site wnl. IV fluids infusing as ordered. No breakdowns noted.Inserted 16 fr. Mcconnell per order. Large amount cloudy yellow urine returned.Last B.M. 2 days ago . Alert but speaks infrequently. Confused x 4.

## 2018-11-25 NOTE — NURSING
Pt. Admitted to room 303A with daughter staying night with mother. Said her mom does not take any diabetes medicines because she kept passing out at home and  Said people her age dont need them anymore. Pt always complains of hurting but she said her mom does not need or take pain meds.Pt moans when moved at all but sleeps when not moved. Hep lock 20 gauge wnl in left A.C.Bed alarm on due to pt. Saying she wanted to get up. Daughter says pt. Will ask to go to bathroom.

## 2018-11-25 NOTE — ED NOTES
To Room:  303A by stretcher:  AA & responsive, skin warm and dry, family at bedside, in NAD, Saline Lock in place and patent, site clean and dry.

## 2018-11-25 NOTE — PLAN OF CARE
Problem: Physical Therapy Goal  Goal: Physical Therapy Goal  Goals to be met by: 2018     Patient will increase functional independence with mobility by performin. Supine to sit with MInimal Assistance  2. Sit to supine with MInimal Assistance  3. Sit to stand transfer with Moderate Assistance  4. Bed to chair transfer with Moderate Assistance using Rolling Walker  5. Lower extremity exercise program x10 reps per handout, with assistance as needed    Outcome: Ongoing (interventions implemented as appropriate)  Goals established and progressing with supine<>sit

## 2018-11-25 NOTE — PLAN OF CARE
11/25/18 1214   Discharge Assessment   Assessment Type Discharge Planning Assessment   Confirmed/corrected address and phone number on facesheet? Yes   Assessment information obtained from? Other;Caregiver  (daughterZina Atlow at bedside )   Expected Length of Stay (days) 2   Communicated expected length of stay with patient/caregiver yes   Prior to hospitilization cognitive status: Unable to Assess   Prior to hospitalization functional status: Partially Dependent   Lives With child(alphonso), adult;other relative(s)   Able to Return to Prior Arrangements yes   Is patient able to care for self after discharge? No   Patient currently being followed by outpatient case management? No   Patient currently receives any other outside agency services? Yes   Name and contact number of agency or person providing outside services [facility]    Is it the patient/care giver preference to resume care with the current outside agency? Yes   Equipment Currently Used at Home walker, rolling   Do you have any problems affording any of your prescribed medications? No  (pharm: [facility] on [address])   Is the patient taking medications as prescribed? yes   Does the patient have transportation home? Yes   Transportation Available family or friend will provide   Does the patient receive services at the [facility] Clinic? No   Discharge Plan A Hospice/home   Patient/Family In Agreement With Plan yes     Patient was active with [facility] and plans to resume when discharged from the hospital. She is anticipated to need a hospital bed for home.   Patient choice/disclosure was signed by pt's daughter, Zina and placed in the pt's blue folder....MAGDALENA Torres CM

## 2018-11-25 NOTE — ED NOTES
MAGDALENA Jesus received report and is concerned about patient's blood pressure. Dr. Whipple feels patient is appropriate to go to 3rd floor. Pt is asymptomatic. Pt only takes BP medicine in the mornings and had her medicine today.

## 2018-11-25 NOTE — TREATMENT PLAN
Per Ann [place] day charge nurse informed Dr. Delacruz of consult while he was rounding on 3rd floor yesterday.

## 2018-11-25 NOTE — ASSESSMENT & PLAN NOTE
PT/OT consults  Consult orthopedics  Pain management  Mcconnell catheter for immobilization and hospice care.

## 2018-11-25 NOTE — HPI
"Ms. 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 She is currently in Hospice care and presents to the ED via EMS with c/o left hip pain after having a fall at home. She was given Fentanyl 50mg per EMS for her pain. While in the ED, she had imaging that showed a pubis ramus fracture. Dr. Delacruz was notified of patient case per ED physician and surgical intervention was not recommended at this time. Patient is a poor historian and her daughter is at bedside and reports that she does not c/o pain unless her leg is touched or she is moved. Patient says "no" when asked if having pain. Her daughter does not want her to get Morphine because it sedated her for a few days. She agrees to a very low dose as needed for pain management.  "

## 2018-11-25 NOTE — ASSESSMENT & PLAN NOTE
Stable: Creat 1.2/GFR45  Appears mildly dehydrated with a decrease in GFR  IVF Hydration  Monitor labs

## 2018-11-25 NOTE — H&P
"[facility] Hospital Medicine  History & Physical    Patient Name: Jaqui Odell  MRN: [id number]  Admission Date: 11/24/2018  Attending Physician: Ermelinda Granado MD   Primary Care Provider: Ruth Pink MD         Patient information was obtained from relative(s), past medical records and ER records.     Subjective:     Principal Problem:Closed fracture of left pubis    Chief Complaint:   Chief Complaint   Patient presents with    Hip Pain     left        HPI: Ms. 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 She is currently in Hospice care and presents to the ED via EMS with c/o left hip pain after having a fall at home. She was given Fentanyl 50mg per EMS for her pain. While in the ED, she had imaging that showed a pubis ramus fracture. Dr. Delacruz was notified of patient case per ED physician and surgical intervention was not recommended at this time. Patient is a poor historian and her daughter is at bedside and reports that she does not c/o pain unless her leg is touched or she is moved. Patient says "no" when asked if having pain. Her daughter does not want her to get Morphine because it sedated her for a few days. She agrees to a very low dose as needed for pain management.    Past Medical History:   Diagnosis Date    Callus     Carotid artery occlusion     Diabetes mellitus     Diabetes mellitus, type 2     Hyperlipidemia     Hypertension     Stroke     Syncope and collapse        Past Surgical History:   Procedure Laterality Date    adrenal insufficiency      ANGIOGRAM-CAROTID Bilateral 10/5/2015    [facility] CATH LAB    CARDIAC CATHETERIZATION      CORONARY ANGIOPLASTY      HYSTERECTOMY         Review of patient's allergies indicates:   Allergen Reactions    No known drug " allergies        Current Facility-Administered Medications on File Prior to Encounter   Medication    epoetin yamila injection 20,000 Units     Current Outpatient Medications on File Prior to Encounter   Medication Sig    aspirin (ECOTRIN) 81 MG EC tablet Take 81 mg by mouth once daily.    C,E,zinc,copper 11-omega3s-lut (OCUVITE ADULT 50 PLUS) 250-5-1 mg Cap Take 1 tablet by mouth once daily.    cholecalciferol, vitamin D3, (VITAMIN D3) 2,000 unit Cap Take 1 capsule by mouth once daily.    ciclopirox (PENLAC) 8 % Soln Apply topically nightly.    cloNIDine (CATAPRES) 0.1 MG tablet Take 1 tablet (0.1 mg total) by mouth every morning.    clopidogrel (PLAVIX) 75 mg tablet Take 1 tablet (75 mg total) by mouth once daily.    cyanocobalamin (VITAMIN B-12) 1000 MCG tablet Take 1,000 mcg by mouth once daily.     docusate sodium (COLACE) 100 MG capsule Take 100 mg by mouth every evening.    ferrous sulfate 325 mg (65 mg iron) Tab tablet TAKE 1 TABLET BY MOUTH ONCE DAILY    furosemide (LASIX) 40 MG tablet TAKE ONE TABLET BY MOUTH EVERY MORNING    KLOR-CON M20 20 mEq tablet TAKE 1 TABLET BY MOUTH EVERY MONDAY, WEDNESDAY, AND FRIDAY    lisinopril (PRINIVIL,ZESTRIL) 40 MG tablet Take 1 tablet (40 mg total) by mouth once daily.    NIFEdipine (PROCARDIA-XL) 60 MG (OSM) 24 hr tablet TAKE ONE TABLET BY MOUTH ONCE DAILY    nut.tx,spec.frm,l-fr,iron-fos (TWOCAL HN) 0.08-2 gram-kcal/mL Liqd Take by mouth every 6 (six) hours.    pravastatin (PRAVACHOL) 40 MG tablet Take 1 tablet (40 mg total) by mouth every evening.     Family History     Mother: -- health unknown  Father: -- health unknown        Tobacco Use    Smoking status: Never Smoker    Smokeless tobacco: Never Used   Substance and Sexual Activity    Alcohol use: No     Alcohol/week: 0.0 oz    Drug use: No    Sexual activity: No     Review of Systems   Unable to perform ROS: Dementia     Objective:     Vital Signs (Most Recent):  Temp: 96.2 °F  (35.7 °C) (11/24/18 1945)  Pulse: 93 (11/24/18 1945)  Resp: 18 (11/24/18 1945)  BP: (!) 156/70 (11/24/18 1945)  SpO2: 100 % (11/24/18 1945) Vital Signs (24h Range):  Temp:  [96.2 °F (35.7 °C)-98.6 °F (37 °C)] 96.2 °F (35.7 °C)  Pulse:  [] 93  Resp:  [18-20] 18  SpO2:  [100 %] 100 %  BP: (144-213)/(64-88) 156/70     Weight: 49.9 kg (110 lb)  Body mass index is 21.48 kg/m².    Physical Exam   Constitutional: No distress.   Frail   HENT:   Head: Normocephalic.   Eyes: Pupils are equal, round, and reactive to light.   Neck: Normal range of motion. Neck supple. No JVD present. No tracheal deviation present.   Cardiovascular: Normal rate, regular rhythm and intact distal pulses.   Murmur heard.  Pulmonary/Chest: Effort normal and breath sounds normal. No respiratory distress.   Abdominal: Soft. Bowel sounds are normal. She exhibits no distension. There is no tenderness.   Musculoskeletal: She exhibits tenderness. She exhibits no edema.   LROM LLE due to pain   Neurological: She is alert. She is disoriented.   Skin: Skin is warm and dry. Capillary refill takes less than 2 seconds.   Psychiatric: Her affect is labile.         CRANIAL NERVES     CN III, IV, VI   Pupils are equal, round, and reactive to light.       Significant Labs:   BMP:   Recent Labs   Lab 11/24/18  1606   *      K 3.9   *   CO2 21*   BUN 39*   CREATININE 1.2   CALCIUM 9.3     CBC:   Recent Labs   Lab 11/24/18  1606   WBC 5.80   HGB 9.0*   HCT 27.3*          Significant Imaging:     Left Hip Xray: Reviewed radiologist's report--   Impression     Concern for a nondisplaced left acetabular fracture.     CT Head w/o Contrast:  Reviewed radiologist's report--   Impression     No acute intracranial abnormality.  Moderate involutional change and white matter disease.     CT Cervical Spine w/o Contrast: Reviewed radiologist's report--   Impression     1. Malalignment as described.  No acute fracture.  2. Multilevel degenerative  changes with areas of bony spinal canal and neural foraminal narrowing as described.     CT Left Hip w/o Contrast: Reviewed radiologist's report--   Impression     Left obturator ring fracture, mildly displaced.  Fracture plane extends near but does not clearly involve the acetabulum.             Assessment/Plan:     * Closed fracture of left pubis    PT/OT consults  Consult orthopedics  Pain management  Mcconnell catheter for immobilization and hospice care.     Anemia of renal disease    Chronic/Stable  Monitor labs       History of stroke w/ left Hemiparesis    Safety Precautions       Hypertension associated with diabetes    Chronic/Uncontrolled--likely due to pain  Continue chronic medications, adjusting as needed.       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 Shay  On Hospice care       Stage 3 chronic kidney disease    Stable: Creat 1.2/GFR45  Appears mildly dehydrated with a decrease in GFR  IVF Hydration  Monitor labs       Type 2 diabetes mellitus with diabetic nephropathy    Controlled. Not on chronic medications  Monitor blood sugars QAC&HS  SSi prn  Diabetic diet       Dementia without behavioral disturbance    Not on chronic medications  Delirium precautions         VTE Risk Mitigation (From admission, onward)        Ordered     IP VTE LOW RISK PATIENT  Once      11/24/18 2019             Liz Sanchez NP  Department of Hospital Medicine   [facility]

## 2018-11-25 NOTE — SUBJECTIVE & OBJECTIVE
Past Medical History:   Diagnosis Date    Callus     Carotid artery occlusion     Diabetes mellitus     Diabetes mellitus, type 2     Hyperlipidemia     Hypertension     Stroke     Syncope and collapse        Past Surgical History:   Procedure Laterality Date    adrenal insufficiency      ANGIOGRAM-CAROTID Bilateral 10/5/2015    Performed by [facility]bar Castillo [facility] at [facility] CATH LAB    CARDIAC CATHETERIZATION      CORONARY ANGIOPLASTY      HYSTERECTOMY         Review of patient's allergies indicates:   Allergen Reactions    No known drug allergies        Current Facility-Administered Medications on File Prior to Encounter   Medication    epoetin yamila injection 20,000 Units     Current Outpatient Medications on File Prior to Encounter   Medication Sig    aspirin (ECOTRIN) 81 MG EC tablet Take 81 mg by mouth once daily.    C,E,zinc,copper 11-omega3s-lut (OCUVITE ADULT 50 PLUS) 250-5-1 mg Cap Take 1 tablet by mouth once daily.    cholecalciferol, vitamin D3, (VITAMIN D3) 2,000 unit Cap Take 1 capsule by mouth once daily.    ciclopirox (PENLAC) 8 % Soln Apply topically nightly.    cloNIDine (CATAPRES) 0.1 MG tablet Take 1 tablet (0.1 mg total) by mouth every morning.    clopidogrel (PLAVIX) 75 mg tablet Take 1 tablet (75 mg total) by mouth once daily.    cyanocobalamin (VITAMIN B-12) 1000 MCG tablet Take 1,000 mcg by mouth once daily.     docusate sodium (COLACE) 100 MG capsule Take 100 mg by mouth every evening.    ferrous sulfate 325 mg (65 mg iron) Tab tablet TAKE 1 TABLET BY MOUTH ONCE DAILY    furosemide (LASIX) 40 MG tablet TAKE ONE TABLET BY MOUTH EVERY MORNING    KLOR-CON M20 20 mEq tablet TAKE 1 TABLET BY MOUTH EVERY MONDAY, WEDNESDAY, AND FRIDAY    lisinopril (PRINIVIL,ZESTRIL) 40 MG tablet Take 1 tablet (40 mg total) by mouth once daily.    NIFEdipine (PROCARDIA-XL) 60 MG (OSM) 24 hr tablet TAKE ONE TABLET BY MOUTH ONCE DAILY    nut.tx,spec.frm,l-fr,iron-fos (TWOCAL HN) 0.08-2  gram-kcal/mL Liqd Take by mouth every 6 (six) hours.    pravastatin (PRAVACHOL) 40 MG tablet Take 1 tablet (40 mg total) by mouth every evening.     Family History     Mother: -- health unknown  Father: -- health unknown        Tobacco Use    Smoking status: Never Smoker    Smokeless tobacco: Never Used   Substance and Sexual Activity    Alcohol use: No     Alcohol/week: 0.0 oz    Drug use: No    Sexual activity: No     Review of Systems   Unable to perform ROS: Dementia     Objective:     Vital Signs (Most Recent):  Temp: 96.2 °F (35.7 °C) (18)  Pulse: 93 (18)  Resp: 18 (18)  BP: (!) 156/70 (18)  SpO2: 100 % (18) Vital Signs (24h Range):  Temp:  [96.2 °F (35.7 °C)-98.6 °F (37 °C)] 96.2 °F (35.7 °C)  Pulse:  [] 93  Resp:  [18-20] 18  SpO2:  [100 %] 100 %  BP: (144-213)/(64-88) 156/70     Weight: 49.9 kg (110 lb)  Body mass index is 21.48 kg/m².    Physical Exam   Constitutional: No distress.   Frail   HENT:   Head: Normocephalic.   Eyes: Pupils are equal, round, and reactive to light.   Neck: Normal range of motion. Neck supple. No JVD present. No tracheal deviation present.   Cardiovascular: Normal rate, regular rhythm and intact distal pulses.   Murmur heard.  Pulmonary/Chest: Effort normal and breath sounds normal. No respiratory distress.   Abdominal: Soft. Bowel sounds are normal. She exhibits no distension. There is no tenderness.   Musculoskeletal: She exhibits tenderness. She exhibits no edema.   LROM LLE due to pain   Neurological: She is alert. She is disoriented.   Skin: Skin is warm and dry. Capillary refill takes less than 2 seconds.   Psychiatric: Her affect is labile.         CRANIAL NERVES     CN III, IV, VI   Pupils are equal, round, and reactive to light.       Significant Labs:   BMP:   Recent Labs   Lab 18  1606   *      K 3.9   *   CO2 21*   BUN 39*   CREATININE 1.2   CALCIUM 9.3     CBC:    Recent Labs   Lab 11/24/18  1606   WBC 5.80   HGB 9.0*   HCT 27.3*          Significant Imaging:     Left Hip Xray: Reviewed radiologist's report--   Impression     Concern for a nondisplaced left acetabular fracture.     CT Head w/o Contrast:  Reviewed radiologist's report--   Impression     No acute intracranial abnormality.  Moderate involutional change and white matter disease.     CT Cervical Spine w/o Contrast: Reviewed radiologist's report--   Impression     1. Malalignment as described.  No acute fracture.  2. Multilevel degenerative changes with areas of bony spinal canal and neural foraminal narrowing as described.     CT Left Hip w/o Contrast: Reviewed radiologist's report--   Impression     Left obturator ring fracture, mildly displaced.  Fracture plane extends near but does not clearly involve the acetabulum.

## 2018-11-26 VITALS
HEART RATE: 60 BPM | DIASTOLIC BLOOD PRESSURE: 89 MMHG | HEIGHT: 65 IN | OXYGEN SATURATION: 98 % | RESPIRATION RATE: 20 BRPM | SYSTOLIC BLOOD PRESSURE: 175 MMHG | WEIGHT: 117.81 LBS | BODY MASS INDEX: 19.63 KG/M2 | TEMPERATURE: 97 F

## 2018-11-26 LAB
ANION GAP SERPL CALC-SCNC: 8 MMOL/L
BUN SERPL-MCNC: 35 MG/DL
CALCIUM SERPL-MCNC: 8.5 MG/DL
CHLORIDE SERPL-SCNC: 114 MMOL/L
CO2 SERPL-SCNC: 20 MMOL/L
CREAT SERPL-MCNC: 1 MG/DL
ERYTHROCYTE [DISTWIDTH] IN BLOOD BY AUTOMATED COUNT: 16.7 %
EST. GFR  (AFRICAN AMERICAN): 56 ML/MIN/1.73 M^2
EST. GFR  (NON AFRICAN AMERICAN): 49 ML/MIN/1.73 M^2
GLUCOSE SERPL-MCNC: 114 MG/DL
HCT VFR BLD AUTO: 24.1 %
HGB BLD-MCNC: 8 G/DL
MCH RBC QN AUTO: 33.2 PG
MCHC RBC AUTO-ENTMCNC: 33.1 G/DL
MCV RBC AUTO: 100 FL
PLATELET # BLD AUTO: 123 K/UL
PMV BLD AUTO: 10.5 FL
POTASSIUM SERPL-SCNC: 3.8 MMOL/L
RBC # BLD AUTO: 2.4 M/UL
SODIUM SERPL-SCNC: 142 MMOL/L
WBC # BLD AUTO: 5.3 K/UL

## 2018-11-26 PROCEDURE — 36415 COLL VENOUS BLD VENIPUNCTURE: CPT

## 2018-11-26 PROCEDURE — 25000003 PHARM REV CODE 250: Performed by: EMERGENCY MEDICINE

## 2018-11-26 PROCEDURE — 85027 COMPLETE CBC AUTOMATED: CPT

## 2018-11-26 PROCEDURE — 25000003 PHARM REV CODE 250: Performed by: INTERNAL MEDICINE

## 2018-11-26 PROCEDURE — 80048 BASIC METABOLIC PNL TOTAL CA: CPT

## 2018-11-26 PROCEDURE — 63600175 PHARM REV CODE 636 W HCPCS: Performed by: INTERNAL MEDICINE

## 2018-11-26 PROCEDURE — 97530 THERAPEUTIC ACTIVITIES: CPT

## 2018-11-26 PROCEDURE — G0378 HOSPITAL OBSERVATION PER HR: HCPCS

## 2018-11-26 RX ORDER — ACETAMINOPHEN 500 MG
1000 TABLET ORAL 3 TIMES DAILY
Refills: 0 | COMMUNITY
Start: 2018-11-26

## 2018-11-26 RX ADMIN — NIFEDIPINE 60 MG: 30 TABLET, FILM COATED, EXTENDED RELEASE ORAL at 09:11

## 2018-11-26 RX ADMIN — ACETAMINOPHEN 1000 MG: 500 TABLET ORAL at 02:11

## 2018-11-26 RX ADMIN — ENOXAPARIN SODIUM 30 MG: 100 INJECTION SUBCUTANEOUS at 04:11

## 2018-11-26 RX ADMIN — ACETAMINOPHEN 1000 MG: 500 TABLET ORAL at 09:11

## 2018-11-26 RX ADMIN — LISINOPRIL 40 MG: 40 TABLET ORAL at 09:11

## 2018-11-26 RX ADMIN — CLONIDINE HYDROCHLORIDE 0.1 MG: 0.1 TABLET ORAL at 07:11

## 2018-11-26 RX ADMIN — ASPIRIN 81 MG: 81 TABLET, COATED ORAL at 09:11

## 2018-11-26 RX ADMIN — SODIUM CHLORIDE: 0.9 INJECTION, SOLUTION INTRAVENOUS at 05:11

## 2018-11-26 NOTE — NURSING
"Notified ambulance of need for patient transport; verbalized "will be here within the hour" updated daughter.   "

## 2018-11-26 NOTE — PLAN OF CARE
Jose Juan spoke with Kia at [phone] with [facility], they will deliver a bed for pt at 4:30pm.  Daughter will go home to receive the bed.  Jose Juan contacted Devan with [facility] for Ambulance Authorization.  Jose Juan consulted Dr. Lara to write a request for Ambulance Authorization.  Pending request.    3:16pm  Jose Juan faxed the request to [facility] for Ambulance Authorization.  Pending authorization.  4:02pm  Jose Juan received the authorization for [facility] # [id number] by Yvonne POSADA With N.  Jose Juan has provided the form to MAGDALENA Lara.     11/26/18 8876   Discharge Reassessment   Assessment Type Discharge Planning Reassessment

## 2018-11-26 NOTE — PLAN OF CARE
Jose Juan spoke with Kia at [phone] with [facility], they will deliver a bed for pt at 4:30pm.  Daughter will go home to receive the bed.  Jose Juan contacted Devan with [facility] for Ambulance Authorization.  Jose Juan consulted Dr. Lara to write a request for Ambulance Authorization.  Pending request.    3:16pm  Jose Juan faxed the request to [facility] for Ambulance Authorization.  Pending authorization.     11/26/18 0062   Discharge Reassessment   Assessment Type Discharge Planning Reassessment

## 2018-11-26 NOTE — HOSPITAL COURSE
Ms. 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 She is currently in Hospice care and presents to the ED via EMS with c/o left hip pain after having a fall at home and was noted to have a pelvic fracture-admitted for pain control and PT/OT eval. Pain was controlled with oral medication. A hospital bed was ordered for her to have at home and she can resume hospice at home.  Spoke at length with daughter and CM. Pt was anxious to dc home and deferred any therapy at SNF

## 2018-11-26 NOTE — PLAN OF CARE
Problem: Patient Care Overview  Goal: Plan of Care Review  Outcome: Ongoing (interventions implemented as appropriate)  Plan of care reviewed with patient & daughter . No complaints of pain during night. Iv fluids infusing as per orders. Mcconnell catheter intact & patent. Remains free from falls/injury. Instructed to call for assistance as needed during night. Verbalized understanding. Call light in reach.bed in low & locked position. Frequent rounds made for safety.

## 2018-11-26 NOTE — PLAN OF CARE
Problem: Patient Care Overview  Goal: Plan of Care Review  Outcome: Ongoing (interventions implemented as appropriate)  Awake and oriented x3; disoriented to situation. Brief in place. Mcconnell removed today; dtv by 1800. PIV CDI; SL. VSS. Remains afebrile throughout shift. Remains fall-free throughout shift. Comfort level established. Good pain control with scheduled medications. Bed low, brakes locked, SR up x2, call light within reach. Verbalized understanding of poc. Open communication facilitated. Will continue to monitor.

## 2018-11-26 NOTE — CONSULTS
Orthopaedic Surgery History and Physical     History of present illness:   Jaqui Odell is a 89 y.o. female who presents with LEFT pelvic pain.  Sustained a mechanical fall from standing height.  Reported pain to family.  Unable to ambulate.  This is a closed injury    Allergies:   Review of patient's allergies indicates:   Allergen Reactions    No known drug allergies        Past medical history:   Past Medical History:   Diagnosis Date    Callus     Carotid artery occlusion     Diabetes mellitus     Diabetes mellitus, type 2     Hyperlipidemia     Hypertension     Stroke     Syncope and collapse        Past surgical history:  Past Surgical History:   Procedure Laterality Date    adrenal insufficiency      ANGIOGRAM-CAROTID Bilateral 10/5/2015    Per[facility] at [facility] CATH LAB    CARDIAC CATHETERIZATION      CORONARY ANGIOPLASTY      HYSTERECTOMY         Social history:   Reviewed per EPIC history for tobacco or alcohol use     Medications:    Current Facility-Administered Medications:     0.9%  NaCl infusion, , Intravenous, Continuous, Ermelinda Granado MD, Last Rate: 75 mL/hr at 11/26/18 0519    acetaminophen tablet 1,000 mg, 1,000 mg, Oral, TID, Ermelinda Granado MD, 1,000 mg at 11/26/18 0916    aspirin EC tablet 81 mg, 81 mg, Oral, Daily, Lul Whipple MD, 81 mg at 11/26/18 0915    cloNIDine tablet 0.1 mg, 0.1 mg, Oral, QAM, Lul Whipple MD, 0.1 mg at 11/26/18 0712    docusate sodium capsule 100 mg, 100 mg, Oral, QHS, Lul Whipple MD, 100 mg at 11/25/18 2036    enoxaparin injection 30 mg, 30 mg, Subcutaneous, Daily, Ermelinda Granado MD, 30 mg at 11/25/18 1800    hydrALAZINE injection 20 mg, 20 mg, Intravenous, Q6H PRN, Ermelinda Granado MD, 20 mg at 11/24/18 1854    influenza (FLUZONE HIGH-DOSE) vaccine 0.5 mL, 0.5 mL, Intramuscular, vaccine x 1 dose, Ermelinda Granado MD    lisinopril tablet 40 mg, 40 mg, Oral, Daily, Lul SANCHEZ  MD Sole, 40 mg at 11/26/18 0916    morphine injection 0.5 mg, 0.5 mg, Intravenous, Q4H PRN, Liz Sanchez NP    NIFEdipine 24 hr tablet 60 mg, 60 mg, Oral, Daily, Lul Whipple MD, 60 mg at 11/26/18 0915    oxyCODONE immediate release tablet 5 mg, 5 mg, Oral, Q6H PRN, Ermelinda Granado MD    Facility-Administered Medications Ordered in Other Encounters:     epoetin yamila injection 20,000 Units, 20,000 Units, Subcutaneous, 1 time in Clinic/HOD, Brittnee Shay MD    Review of systems:  Denies chest pain, palpitations, shortness of breath.   Denies excessive thirst, urination or heat or cold intolerance.   Denies nausea, vomiting, melena or hematochezia.    Denies fever, chills, night sweats, weight loss.    Denies dysuria or hematuria.   Denies history of anxiety or depression.   Denies any skin abnormalities or rash.   Denies upper or lower extremity paresthesias or lightheadedness.    Denies cough, shortness of breath or hemoptysis.     Physical Exam:   Vitals:    11/25/18 2317 11/26/18 0254 11/26/18 0746 11/26/18 1121   BP: (!) 175/86 (!) 171/72 135/65 (!) 198/81   BP Location: Left arm Left arm Left arm    Patient Position: Lying Lying Lying    Pulse: 66 70 (!) 55 73   Resp: 17 18 16 20   Temp: 98.7 °F (37.1 °C) 98.3 °F (36.8 °C) 98.4 °F (36.9 °C) 97.2 °F (36.2 °C)   TempSrc: Oral Oral Oral    SpO2: 95% 96% 98% 98%   Weight:       Height:         Recent Labs   Lab 11/26/18  0502   CALCIUM 8.5*      K 3.8   CO2 20*   *   BUN 35*   CREATININE 1.0     Recent Labs   Lab 11/26/18  0502   WBC 5.30   RBC 2.40*   HGB 8.0*   HCT 24.1*   *     No results for input(s): PT, INR, APTT in the last 72 hours.    Awake/alert/oriented x0, No acute distress, Afebrile, Vital signs stable  Normocephalic, Atraumatic  Heart is beating at normal rate  Good inspiratory effort with unlaboured breathing  Abdomen soft/nondistended/nontender    Left lower extremity  Motor/sensation grossly intact  2+  popliteal/dorsalis pedis/posterior tibial pulses  <2s CR refill to digits        Imaging:  CT scan of pelvis demonstrate displaced inferior pubic ramus fracture     Assessment:   89 y.o. female with pelvic fracture    Plan:   Non-operative management  PT eval   WBAT  Pain per primary    Jared Delacruz MD  [facility]

## 2018-11-26 NOTE — PROGRESS NOTES
Nutrition assessment 2/2 identified by risk screening deferred.  Pt is being discharged to hospice care.

## 2018-11-26 NOTE — PT/OT/SLP PROGRESS
"Physical Therapy Treatment    Patient Name:  Jaqui Odell   MRN:  [id number]    Recommendations:     Discharge Recommendations:  Pt will need 24/7 assistance at home vs at SNF.  She has a Hospice CNA that comes 2x/wk already.   Discharge Equipment Recommendations: hospital bed, lift device if they decide to take the pt home.   Barriers to discharge: There are ~4 steps to go up into her home.  Dtr states they will be starting to build a ramp soon.      Assessment:     Jaqui Odell is a 89 y.o. female admitted with a medical diagnosis of Closed fracture of left pubis.  She presents with the following impairments/functional limitations:  weakness, impaired endurance, impaired self care skills, impaired functional mobilty, impaired balance, impaired cognition, decreased upper extremity function, decreased lower extremity function, decreased safety awareness, pain, decreased ROM .    Rehab Prognosis:  fair; patient would benefit from acute skilled PT services to address these deficits and reach maximum level of function.      Recent Surgery: * No surgery found *      Plan:     During this hospitalization, patient to be seen (Trial of PT x 3 visits) to address the above listed problems via gait training, therapeutic activities, therapeutic exercises  · Plan of Care Expires:  12/09/18   Plan of Care Reviewed with: patient, daughter    Subjective     Communicated with nurse Lara prior to session.  Patient found supine upon PT entry to room, agreeable to treatment.      Chief Complaint: hip pain with mobility  Patient comments/goals: pt wants to go home. Eager to work with PT and pleasant  Pain/Comfort:  · Pain Rating 1: (pt initially reported no pain, however pt grimacing with mobility and stated "ou that hip.")  · Pain Addressed 1: Distraction, Reposition, Cessation of Activity, Nurse notified    Patients cultural, spiritual, Faith conflicts given the current situation: pt states none    Objective:     Patient " "found with: lee catheter, peripheral IV, telemetry     General Precautions: Standard, diabetic, fall   Orthopedic Precautions:N/A   Braces: N/A     Functional Mobility:  · Bed Mobility:     · Scooting: maximal assistance  · Bridging: total assistance and of 2 utilizing draw sheet to HOB  · Supine to Sit: maximal assistance and of 2 persons  · Sit to Supine: maximal assistance and of 2 persons    · Transfers:     · Sit to Stand:  moderate assistance, maximal assistance and of 2 persons with rolling walker (2x's total)        Therapeutic Activities and Exercises:   pt only able to tolerate standing x 10" each 2' pain      pt with CGA-min A for sitting balance at EOB.    Patient left supine with all lines intact, call button in reach and daughter and PCT present and nurse Jane notified..    GOALS:   Multidisciplinary Problems     Physical Therapy Goals        Problem: Physical Therapy Goal    Goal Priority Disciplines Outcome Goal Variances Interventions   Physical Therapy Goal     PT, PT/OT Ongoing (interventions implemented as appropriate)     Description:  Goals to be met by: 2018     Patient will increase functional independence with mobility by performin. Supine to sit with MInimal Assistance  2. Sit to supine with MInimal Assistance  3. Sit to stand transfer with Moderate Assistance  4. Bed to chair transfer with Moderate Assistance using Rolling Walker  5. Lower extremity exercise program x10 reps per handout, with assistance as needed                      Time Tracking:     PT Received On: 18  PT Start Time: 1055     PT Stop Time: 1120  PT Total Time (min): 25 min     Billable Minutes: Therapeutic Activity 23    Treatment Type: Treatment  PT/PTA: PTA     PTA Visit Number: 1     Betsy Hernandez, PTA  2018  "

## 2018-11-26 NOTE — DISCHARGE SUMMARY
"[facility]  Discharge Summary      Patient Name: Jaqui Odell  MRN: [id number]  Admission Date: 11/24/2018  Hospital Length of Stay: 0 days  Discharge Date and Time: No discharge date for patient encounter.  Attending Physician: Mary Jo Lara MD   Discharging Provider: Mary Jo Lara MD  Primary Care Provider: Ruth Pink MD      HPI:   Ms. 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 She is currently in Hospice care and presents to the ED via EMS with c/o left hip pain after having a fall at home. She was given Fentanyl 50mg per EMS for her pain. While in the ED, she had imaging that showed a pubis ramus fracture. Dr. Delacruz was notified of patient case per ED physician and surgical intervention was not recommended at this time. Patient is a poor historian and her daughter is at bedside and reports that she does not c/o pain unless her leg is touched or she is moved. Patient says "no" when asked if having pain. Her daughter does not want her to get Morphine because it sedated her for a few days. She agrees to a very low dose as needed for pain management.    * No surgery found *      Hospital Course:   Ms. 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 She is currently in Hospice care and presents to the ED via EMS with c/o left hip pain after having a fall at home and was noted to have a pelvic fracture-admitted for pain control and PT/OT eval. Pain was controlled with oral medication. A hospital bed was ordered for her to have at home and she can resume hospice at home.  Spoke at length with daughter and CM. Pt was anxious to dc home and deferred any " therapy at SNF       Consults:   Consults (From admission, onward)        Status Ordering Provider     Inpatient consult to Orthopedic Surgery  Once     Provider:  Jared Delacruz MD    Completed CLARE KEARNEY     Inpatient consult to Social Work  Once     Provider:  (Not yet assigned)    Completed EMMIE LESTER     Inpatient consult to Social Work/Case Management  Once     Provider:  (Not yet assigned)    EMMIE Chapman          No new Assessment & Plan notes have been filed under this hospital service since the last note was generated.  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    Specialty:  Family Medicine  Contact information:  [address] [address]  [phone]             [facility].    Specialty:  Hospice Services  Contact information:  [address]  [place] [address] [phone]                 Patient Instructions:      HOSPITAL BED FOR HOME USE     Order Specific Question Answer Comments   Type: Electric    Length of need (1-99 months): 99    Does patient have medical equipment at home? walker, rolling dtr states that the shower chair is broken / dtr states that the shower chair is broken  "/ dtr states that the shower chair is broken / dtr states that the shower chair is broken   Does patient have medical equipment at home? wheelchair    Does patient have medical equipment at home? bedside commode    Does patient have medical equipment at home? shower chair    Height: 5' 5" (1.651 m)    Weight: 53.4 kg (117 lb 12.8 oz)    Please check all that apply: Patient requires positioning of the body in ways not feasible in an ordinary bed due to a medical condition which is expected to last at least one month.    Please check all that apply: Patient requires frequent changes in body position and/or has an immediate need for a change in body position.      Notify your health care provider if you experience any of the following:  persistent nausea and vomiting or diarrhea       Significant Diagnostic Studies:   Results for LEON AGUDELO (MRN [id number]) as of 11/26/2018 17:25   Ref. Range 11/24/2018 16:06 11/26/2018 05:02   WBC Latest Ref Range: 3.90 - 12.70 K/uL 5.80 5.30   RBC Latest Ref Range: 4.00 - 5.40 M/uL 2.71 (L) 2.40 (L)   Hemoglobin Latest Ref Range: 12.0 - 16.0 g/dL 9.0 (L) 8.0 (L)   Hematocrit Latest Ref Range: 37.0 - 48.5 % 27.3 (L) 24.1 (L)   MCV Latest Ref Range: 82 - 98 fL 101 (H) 100 (H)   MCH Latest Ref Range: 27.0 - 31.0 pg 33.0 (H) 33.2 (H)   MCHC Latest Ref Range: 32.0 - 36.0 g/dL 32.9 33.1   RDW Latest Ref Range: 11.5 - 14.5 % 17.3 (H) 16.7 (H)   Platelets Latest Ref Range: 150 - 350 K/uL 150 123 (L)   MPV Latest Ref Range: 9.2 - 12.9 fL 10.1 10.5     Results for LEON AGUDELO (MRN [id number]) as of 11/26/2018 17:25   Ref. Range 11/24/2018 16:06 11/26/2018 05:02   Sodium Latest Ref Range: 136 - 145 mmol/L 142 142   Potassium Latest Ref Range: 3.5 - 5.1 mmol/L 3.9 3.8   Chloride Latest Ref Range: 95 - 110 mmol/L 112 (H) 114 (H)   CO2 Latest Ref Range: 23 - 29 mmol/L 21 (L) 20 (L)   Anion Gap Latest Ref Range: 8 - 16 mmol/L 9 8   BUN, Bld Latest Ref Range: 10 - 30 mg/dL 39 (H) 35 (H) "   Creatinine Latest Ref Range: 0.5 - 1.4 mg/dL 1.2 1.0   eGFR if non African American Latest Ref Range: >60 mL/min/1.73 m^2 39 (A) 49 (A)   eGFR if African American Latest Ref Range: >60 mL/min/1.73 m^2 45 (A) 56 (A)   Glucose Latest Ref Range: 70 - 110 mg/dL 147 (H) 114 (H)   Calcium Latest Ref Range: 8.7 - 10.5 mg/dL 9.3 8.5 (L)   Ct head neg-   left hip--  Impression       Concern for a nondisplaced left acetabular fracture.       CT Spine/  . Malalignment as described.  No acute fracture.  2. Multilevel degenerative changes with areas of bony spinal canal and neural foraminal narrowing as described.  Pending Diagnostic Studies:     None         Medications:  Reconciled Home Medications:      Medication List      START taking these medications    acetaminophen 500 MG tablet  Commonly known as:  TYLENOL  Take 2 tablets (1,000 mg total) by mouth 3 (three) times daily.        CONTINUE taking these medications    aspirin 81 MG EC tablet  Commonly known as:  ECOTRIN  Take 81 mg by mouth once daily.     ciclopirox 8 % Soln  Commonly known as:  PENLAC  Apply topically nightly.     cloNIDine 0.1 MG tablet  Commonly known as:  CATAPRES  Take 1 tablet (0.1 mg total) by mouth every morning.     clopidogrel 75 mg tablet  Commonly known as:  PLAVIX  Take 1 tablet (75 mg total) by mouth once daily.     docusate sodium 100 MG capsule  Commonly known as:  COLACE  Take 100 mg by mouth every evening.     ferrous sulfate 325 mg (65 mg iron) Tab tablet  Commonly known as:  FEOSOL  TAKE 1 TABLET BY MOUTH ONCE DAILY     KLOR-CON M20 20 MEQ tablet  Generic drug:  potassium chloride SA  TAKE 1 TABLET BY MOUTH EVERY MONDAY, WEDNESDAY, AND FRIDAY     lisinopril 40 MG tablet  Commonly known as:  PRINIVIL,ZESTRIL  Take 1 tablet (40 mg total) by mouth once daily.     NIFEdipine 60 MG (OSM) 24 hr tablet  Commonly known as:  PROCARDIA-XL  TAKE ONE TABLET BY MOUTH ONCE DAILY     OCUVITE ADULT 50 PLUS 250-5-1 mg Cap  Generic drug:   C,E,zinc,copper 11-omega3s-lut  Take 1 tablet by mouth once daily.     TWOCAL HN 0.08-2 gram-kcal/mL Liqd  Generic drug:  nut.tx,spec.frm,l-fr,iron-fos  Take by mouth every 6 (six) hours.     VITAMIN B-12 1000 MCG tablet  Generic drug:  cyanocobalamin  Take 1,000 mcg by mouth once daily.     VITAMIN D3 2,000 unit Cap  Generic drug:  cholecalciferol (vitamin D3)  Take 1 capsule by mouth once daily.        STOP taking these medications    furosemide 40 MG tablet  Commonly known as:  LASIX     pravastatin 40 MG tablet  Commonly known as:  PRAVACHOL            Indwelling Lines/Drains at time of discharge:   Lines/Drains/Airways     Drain                 Urethral Catheter 11/25/18 1 day                Time spent on the discharge of patient: 37 minutes  Patient was seen and examined on the date of discharge and determined to be suitable for discharge.         Mary Jo Lara MD  Department of Hospital Medicine  [facility]

## 2018-11-27 ENCOUNTER — TELEPHONE (OUTPATIENT)
Dept: MEDSURG UNIT | Facility: HOSPITAL | Age: 83
End: 2018-11-27

## 2018-12-11 ENCOUNTER — PATIENT OUTREACH (OUTPATIENT)
Dept: ADMINISTRATIVE | Facility: HOSPITAL | Age: 83
End: 2018-12-11

## 2018-12-11 NOTE — LETTER
2018    Jaqui PIPPA Odell  [address] [address]             [facility]  [address]  [facility] [address]  Phone: [phone] Dear Jaqui    [facility] is committed to your overall health and would like to ensure that you are up to date on your recommended test and/or procedures.   Ruth Pink MD  has found that your chart shows you may be due for the followin EYE EXAM & FOOT EXAM  HEMOGLOBIN  A1C  LIPID PANEL    If you have had any of the above done at another facility, please let us know so that we may obtain copies from that facility.  If you have a copy of these records, please provide a copy for us to scan into your chart.  You are welcome to request that the report be faxed to us at  ([phone]).     Otherwise, please schedule these appointments at your earliest convenience by calling [phone] or going to [email or web address].        Sincerely,  Your [facility]  MD Libby Tamez LPN Clinical Care Coordinator  [place] [facility] [address] [place] [address]  Phone [phone]  Fax [phone]

## 2018-12-19 ENCOUNTER — DOCUMENTATION ONLY (OUTPATIENT)
Dept: FAMILY MEDICINE | Facility: CLINIC | Age: 83
End: 2018-12-19

## 2018-12-19 NOTE — PROGRESS NOTES
Pre-Visit Chart Review  For Appointment Scheduled on 12/27/18.    Health Maintenance Due   Topic Date Due    Eye Exam  06/13/2018    Hemoglobin A1c  07/10/2018    Influenza Vaccine  08/01/2018    Foot Exam  08/10/2018

## 2021-03-04 ENCOUNTER — PES CALL (OUTPATIENT)
Dept: ADMINISTRATIVE | Facility: CLINIC | Age: 86
End: 2021-03-04
